# Patient Record
Sex: MALE | Race: WHITE | NOT HISPANIC OR LATINO | URBAN - METROPOLITAN AREA
[De-identification: names, ages, dates, MRNs, and addresses within clinical notes are randomized per-mention and may not be internally consistent; named-entity substitution may affect disease eponyms.]

---

## 2017-06-28 ENCOUNTER — OFFICE (AMBULATORY)
Dept: URBAN - METROPOLITAN AREA CLINIC 75 | Facility: CLINIC | Age: 57
End: 2017-06-28
Payer: OTHER GOVERNMENT

## 2017-06-28 VITALS
DIASTOLIC BLOOD PRESSURE: 82 MMHG | WEIGHT: 189 LBS | HEIGHT: 70 IN | HEART RATE: 80 BPM | SYSTOLIC BLOOD PRESSURE: 122 MMHG

## 2017-06-28 DIAGNOSIS — Z12.11 ENCOUNTER FOR SCREENING FOR MALIGNANT NEOPLASM OF COLON: ICD-10-CM

## 2017-06-28 PROCEDURE — 99203 OFFICE O/P NEW LOW 30 MIN: CPT

## 2017-07-19 VITALS
SYSTOLIC BLOOD PRESSURE: 93 MMHG | RESPIRATION RATE: 15 BRPM | HEART RATE: 49 BPM | DIASTOLIC BLOOD PRESSURE: 60 MMHG | RESPIRATION RATE: 20 BRPM | DIASTOLIC BLOOD PRESSURE: 71 MMHG | DIASTOLIC BLOOD PRESSURE: 74 MMHG | SYSTOLIC BLOOD PRESSURE: 116 MMHG | OXYGEN SATURATION: 98 % | RESPIRATION RATE: 22 BRPM | DIASTOLIC BLOOD PRESSURE: 70 MMHG | SYSTOLIC BLOOD PRESSURE: 131 MMHG | DIASTOLIC BLOOD PRESSURE: 57 MMHG | SYSTOLIC BLOOD PRESSURE: 120 MMHG | HEIGHT: 70 IN | WEIGHT: 189 LBS | SYSTOLIC BLOOD PRESSURE: 92 MMHG | TEMPERATURE: 97.9 F | SYSTOLIC BLOOD PRESSURE: 112 MMHG | DIASTOLIC BLOOD PRESSURE: 65 MMHG | DIASTOLIC BLOOD PRESSURE: 59 MMHG | DIASTOLIC BLOOD PRESSURE: 75 MMHG | OXYGEN SATURATION: 96 % | HEART RATE: 61 BPM | RESPIRATION RATE: 18 BRPM | SYSTOLIC BLOOD PRESSURE: 88 MMHG | DIASTOLIC BLOOD PRESSURE: 62 MMHG | SYSTOLIC BLOOD PRESSURE: 128 MMHG | RESPIRATION RATE: 16 BRPM | RESPIRATION RATE: 17 BRPM | SYSTOLIC BLOOD PRESSURE: 101 MMHG | TEMPERATURE: 96.8 F | HEART RATE: 52 BPM | SYSTOLIC BLOOD PRESSURE: 97 MMHG | DIASTOLIC BLOOD PRESSURE: 82 MMHG | HEART RATE: 55 BPM | HEART RATE: 60 BPM | OXYGEN SATURATION: 97 % | HEART RATE: 57 BPM | SYSTOLIC BLOOD PRESSURE: 89 MMHG | HEART RATE: 56 BPM

## 2017-07-20 ENCOUNTER — AMBULATORY SURGICAL CENTER (AMBULATORY)
Dept: URBAN - METROPOLITAN AREA SURGERY 17 | Facility: SURGERY | Age: 57
End: 2017-07-20
Payer: OTHER GOVERNMENT

## 2017-07-20 DIAGNOSIS — K57.30 DIVERTICULOSIS OF LARGE INTESTINE WITHOUT PERFORATION OR ABS: ICD-10-CM

## 2017-07-20 DIAGNOSIS — Z12.11 ENCOUNTER FOR SCREENING FOR MALIGNANT NEOPLASM OF COLON: ICD-10-CM

## 2017-07-20 DIAGNOSIS — K64.8 OTHER HEMORRHOIDS: ICD-10-CM

## 2017-07-20 PROCEDURE — 45378 DIAGNOSTIC COLONOSCOPY: CPT

## 2017-07-20 RX ADMIN — PROPOFOL 50 MG: 10 INJECTION, EMULSION INTRAVENOUS at 13:07

## 2017-07-20 RX ADMIN — PROPOFOL 50 MG: 10 INJECTION, EMULSION INTRAVENOUS at 12:56

## 2017-07-20 RX ADMIN — PROPOFOL 50 MG: 10 INJECTION, EMULSION INTRAVENOUS at 13:04

## 2017-07-20 RX ADMIN — PROPOFOL 50 MG: 10 INJECTION, EMULSION INTRAVENOUS at 13:00

## 2017-07-20 RX ADMIN — PROPOFOL 50 MG: 10 INJECTION, EMULSION INTRAVENOUS at 12:58

## 2017-07-20 RX ADMIN — PROPOFOL 50 MG: 10 INJECTION, EMULSION INTRAVENOUS at 13:02

## 2017-07-20 RX ADMIN — LIDOCAINE HYDROCHLORIDE 50 MG: 10 INJECTION, SOLUTION EPIDURAL; INFILTRATION; INTRACAUDAL; PERINEURAL at 12:54

## 2017-07-20 RX ADMIN — PROPOFOL 100 MG: 10 INJECTION, EMULSION INTRAVENOUS at 12:54

## 2018-09-05 ENCOUNTER — OFFICE VISIT CONVERTED (OUTPATIENT)
Dept: FAMILY MEDICINE CLINIC | Facility: CLINIC | Age: 58
End: 2018-09-05
Attending: NURSE PRACTITIONER

## 2018-09-05 ENCOUNTER — CONVERSION ENCOUNTER (OUTPATIENT)
Dept: FAMILY MEDICINE CLINIC | Facility: CLINIC | Age: 58
End: 2018-09-05

## 2018-09-12 ENCOUNTER — CONVERSION ENCOUNTER (OUTPATIENT)
Dept: MAMMOGRAPHY | Facility: HOSPITAL | Age: 58
End: 2018-09-12

## 2018-12-03 ENCOUNTER — OFFICE VISIT CONVERTED (OUTPATIENT)
Dept: FAMILY MEDICINE CLINIC | Facility: CLINIC | Age: 58
End: 2018-12-03
Attending: NURSE PRACTITIONER

## 2019-01-11 ENCOUNTER — OFFICE VISIT CONVERTED (OUTPATIENT)
Dept: UROLOGY | Facility: CLINIC | Age: 59
End: 2019-01-11
Attending: UROLOGY

## 2019-06-03 ENCOUNTER — CONVERSION ENCOUNTER (OUTPATIENT)
Dept: FAMILY MEDICINE CLINIC | Facility: CLINIC | Age: 59
End: 2019-06-03

## 2019-06-03 ENCOUNTER — OFFICE VISIT CONVERTED (OUTPATIENT)
Dept: FAMILY MEDICINE CLINIC | Facility: CLINIC | Age: 59
End: 2019-06-03
Attending: NURSE PRACTITIONER

## 2019-06-03 ENCOUNTER — HOSPITAL ENCOUNTER (OUTPATIENT)
Dept: FAMILY MEDICINE CLINIC | Facility: CLINIC | Age: 59
Discharge: HOME | End: 2019-06-03
Attending: NURSE PRACTITIONER

## 2019-06-03 LAB
ALBUMIN SERPL-MCNC: 4.5 G/DL (ref 3.5–5)
ALBUMIN/GLOB SERPL: 1.4 {RATIO} (ref 1.4–2.6)
ALP SERPL-CCNC: 73 U/L (ref 56–119)
ALT SERPL-CCNC: 33 U/L (ref 10–40)
ANION GAP SERPL CALC-SCNC: 22 MMOL/L (ref 8–19)
AST SERPL-CCNC: 30 U/L (ref 15–50)
BILIRUB SERPL-MCNC: 1.18 MG/DL (ref 0.2–1.3)
BUN SERPL-MCNC: 19 MG/DL (ref 5–25)
BUN/CREAT SERPL: 19 {RATIO} (ref 6–20)
CALCIUM SERPL-MCNC: 9.6 MG/DL (ref 8.7–10.4)
CHLORIDE SERPL-SCNC: 104 MMOL/L (ref 99–111)
CHOLEST SERPL-MCNC: 164 MG/DL (ref 107–200)
CHOLEST/HDLC SERPL: 3 {RATIO} (ref 3–6)
CONV CO2: 20 MMOL/L (ref 22–32)
CONV TOTAL PROTEIN: 7.7 G/DL (ref 6.3–8.2)
CREAT UR-MCNC: 0.99 MG/DL (ref 0.7–1.2)
GFR SERPLBLD BASED ON 1.73 SQ M-ARVRAT: >60 ML/MIN/{1.73_M2}
GLOBULIN UR ELPH-MCNC: 3.2 G/DL (ref 2–3.5)
GLUCOSE SERPL-MCNC: 94 MG/DL (ref 70–99)
HDLC SERPL-MCNC: 55 MG/DL (ref 40–60)
LDLC SERPL CALC-MCNC: 94 MG/DL (ref 70–100)
OSMOLALITY SERPL CALC.SUM OF ELEC: 294 MOSM/KG (ref 273–304)
POTASSIUM SERPL-SCNC: 4.7 MMOL/L (ref 3.5–5.3)
PSA SERPL-MCNC: 3.23 NG/ML (ref 0–4)
SODIUM SERPL-SCNC: 141 MMOL/L (ref 135–147)
TRIGL SERPL-MCNC: 73 MG/DL (ref 40–150)
VLDLC SERPL-MCNC: 15 MG/DL (ref 5–37)

## 2019-12-03 ENCOUNTER — OFFICE VISIT CONVERTED (OUTPATIENT)
Dept: FAMILY MEDICINE CLINIC | Facility: CLINIC | Age: 59
End: 2019-12-03
Attending: NURSE PRACTITIONER

## 2020-09-02 ENCOUNTER — CONVERSION ENCOUNTER (OUTPATIENT)
Dept: FAMILY MEDICINE CLINIC | Facility: CLINIC | Age: 60
End: 2020-09-02

## 2020-09-02 ENCOUNTER — HOSPITAL ENCOUNTER (OUTPATIENT)
Dept: FAMILY MEDICINE CLINIC | Facility: CLINIC | Age: 60
Discharge: HOME | End: 2020-09-02
Attending: NURSE PRACTITIONER

## 2020-09-02 ENCOUNTER — OFFICE VISIT CONVERTED (OUTPATIENT)
Dept: FAMILY MEDICINE CLINIC | Facility: CLINIC | Age: 60
End: 2020-09-02
Attending: NURSE PRACTITIONER

## 2020-09-03 LAB
25(OH)D3 SERPL-MCNC: 42.8 NG/ML (ref 30–100)
ALBUMIN SERPL-MCNC: 4.6 G/DL (ref 3.5–5)
ALBUMIN/GLOB SERPL: 1.3 {RATIO} (ref 1.4–2.6)
ALP SERPL-CCNC: 75 U/L (ref 56–155)
ALT SERPL-CCNC: 22 U/L (ref 10–40)
ANION GAP SERPL CALC-SCNC: 20 MMOL/L (ref 8–19)
AST SERPL-CCNC: 23 U/L (ref 15–50)
BASOPHILS # BLD AUTO: 0.04 10*3/UL (ref 0–0.2)
BASOPHILS NFR BLD AUTO: 0.5 % (ref 0–3)
BILIRUB SERPL-MCNC: 0.6 MG/DL (ref 0.2–1.3)
BUN SERPL-MCNC: 20 MG/DL (ref 5–25)
BUN/CREAT SERPL: 21 {RATIO} (ref 6–20)
CALCIUM SERPL-MCNC: 10 MG/DL (ref 8.7–10.4)
CHLORIDE SERPL-SCNC: 105 MMOL/L (ref 99–111)
CHOLEST SERPL-MCNC: 188 MG/DL (ref 107–200)
CHOLEST/HDLC SERPL: 3.1 {RATIO} (ref 3–6)
CONV ABS IMM GRAN: 0.02 10*3/UL (ref 0–0.2)
CONV CO2: 21 MMOL/L (ref 22–32)
CONV IMMATURE GRAN: 0.3 % (ref 0–1.8)
CONV TOTAL PROTEIN: 8.1 G/DL (ref 6.3–8.2)
CREAT UR-MCNC: 0.96 MG/DL (ref 0.7–1.2)
DEPRECATED RDW RBC AUTO: 49.6 FL (ref 35.1–43.9)
EOSINOPHIL # BLD AUTO: 0.06 10*3/UL (ref 0–0.7)
EOSINOPHIL # BLD AUTO: 0.8 % (ref 0–7)
ERYTHROCYTE [DISTWIDTH] IN BLOOD BY AUTOMATED COUNT: 13.6 % (ref 11.6–14.4)
GFR SERPLBLD BASED ON 1.73 SQ M-ARVRAT: >60 ML/MIN/{1.73_M2}
GLOBULIN UR ELPH-MCNC: 3.5 G/DL (ref 2–3.5)
GLUCOSE SERPL-MCNC: 89 MG/DL (ref 70–99)
HCT VFR BLD AUTO: 46.4 % (ref 42–52)
HDLC SERPL-MCNC: 61 MG/DL (ref 40–60)
HGB BLD-MCNC: 14.7 G/DL (ref 14–18)
LDLC SERPL CALC-MCNC: 109 MG/DL (ref 70–100)
LYMPHOCYTES # BLD AUTO: 2.12 10*3/UL (ref 1–5)
LYMPHOCYTES NFR BLD AUTO: 28.5 % (ref 20–45)
MCH RBC QN AUTO: 30.8 PG (ref 27–31)
MCHC RBC AUTO-ENTMCNC: 31.7 G/DL (ref 33–37)
MCV RBC AUTO: 97.3 FL (ref 80–96)
MONOCYTES # BLD AUTO: 0.76 10*3/UL (ref 0.2–1.2)
MONOCYTES NFR BLD AUTO: 10.2 % (ref 3–10)
NEUTROPHILS # BLD AUTO: 4.43 10*3/UL (ref 2–8)
NEUTROPHILS NFR BLD AUTO: 59.7 % (ref 30–85)
NRBC CBCN: 0 % (ref 0–0.7)
OSMOLALITY SERPL CALC.SUM OF ELEC: 294 MOSM/KG (ref 273–304)
PLATELET # BLD AUTO: 295 10*3/UL (ref 130–400)
PMV BLD AUTO: 11.8 FL (ref 9.4–12.4)
POTASSIUM SERPL-SCNC: 4.7 MMOL/L (ref 3.5–5.3)
PSA SERPL-MCNC: 3.78 NG/ML (ref 0–4)
RBC # BLD AUTO: 4.77 10*6/UL (ref 4.7–6.1)
SODIUM SERPL-SCNC: 141 MMOL/L (ref 135–147)
TRIGL SERPL-MCNC: 90 MG/DL (ref 40–150)
TSH SERPL-ACNC: 3.11 M[IU]/L (ref 0.27–4.2)
VLDLC SERPL-MCNC: 18 MG/DL (ref 5–37)
WBC # BLD AUTO: 7.43 10*3/UL (ref 4.8–10.8)

## 2021-03-08 ENCOUNTER — HOSPITAL ENCOUNTER (OUTPATIENT)
Dept: FAMILY MEDICINE CLINIC | Facility: CLINIC | Age: 61
Discharge: HOME | End: 2021-03-08
Attending: NURSE PRACTITIONER

## 2021-03-08 ENCOUNTER — OFFICE VISIT CONVERTED (OUTPATIENT)
Dept: FAMILY MEDICINE CLINIC | Facility: CLINIC | Age: 61
End: 2021-03-08
Attending: NURSE PRACTITIONER

## 2021-03-08 ENCOUNTER — CONVERSION ENCOUNTER (OUTPATIENT)
Dept: FAMILY MEDICINE CLINIC | Facility: CLINIC | Age: 61
End: 2021-03-08

## 2021-03-08 LAB
ALBUMIN SERPL-MCNC: 4.3 G/DL (ref 3.5–5)
ALBUMIN/GLOB SERPL: 1.3 {RATIO} (ref 1.4–2.6)
ALP SERPL-CCNC: 60 U/L (ref 56–155)
ALT SERPL-CCNC: 25 U/L (ref 10–40)
ANION GAP SERPL CALC-SCNC: 14 MMOL/L (ref 8–19)
AST SERPL-CCNC: 23 U/L (ref 15–50)
BASOPHILS # BLD AUTO: 0.05 10*3/UL (ref 0–0.2)
BASOPHILS NFR BLD AUTO: 0.7 % (ref 0–3)
BILIRUB SERPL-MCNC: 0.78 MG/DL (ref 0.2–1.3)
BUN SERPL-MCNC: 23 MG/DL (ref 5–25)
BUN/CREAT SERPL: 21 {RATIO} (ref 6–20)
CALCIUM SERPL-MCNC: 9.9 MG/DL (ref 8.7–10.4)
CHLORIDE SERPL-SCNC: 106 MMOL/L (ref 99–111)
CHOLEST SERPL-MCNC: 152 MG/DL (ref 107–200)
CHOLEST/HDLC SERPL: 2.9 {RATIO} (ref 3–6)
CONV ABS IMM GRAN: 0.02 10*3/UL (ref 0–0.2)
CONV CO2: 25 MMOL/L (ref 22–32)
CONV IMMATURE GRAN: 0.3 % (ref 0–1.8)
CONV TOTAL PROTEIN: 7.6 G/DL (ref 6.3–8.2)
CREAT UR-MCNC: 1.1 MG/DL (ref 0.7–1.2)
DEPRECATED RDW RBC AUTO: 41.7 FL (ref 35.1–43.9)
EOSINOPHIL # BLD AUTO: 0.25 10*3/UL (ref 0–0.7)
EOSINOPHIL # BLD AUTO: 3.5 % (ref 0–7)
ERYTHROCYTE [DISTWIDTH] IN BLOOD BY AUTOMATED COUNT: 12.1 % (ref 11.6–14.4)
GFR SERPLBLD BASED ON 1.73 SQ M-ARVRAT: >60 ML/MIN/{1.73_M2}
GLOBULIN UR ELPH-MCNC: 3.3 G/DL (ref 2–3.5)
GLUCOSE SERPL-MCNC: 75 MG/DL (ref 70–99)
HCT VFR BLD AUTO: 43.5 % (ref 42–52)
HDLC SERPL-MCNC: 52 MG/DL (ref 40–60)
HGB BLD-MCNC: 14.7 G/DL (ref 14–18)
LDLC SERPL CALC-MCNC: 89 MG/DL (ref 70–100)
LYMPHOCYTES # BLD AUTO: 1.95 10*3/UL (ref 1–5)
LYMPHOCYTES NFR BLD AUTO: 27.5 % (ref 20–45)
MCH RBC QN AUTO: 31.6 PG (ref 27–31)
MCHC RBC AUTO-ENTMCNC: 33.8 G/DL (ref 33–37)
MCV RBC AUTO: 93.5 FL (ref 80–96)
MONOCYTES # BLD AUTO: 0.77 10*3/UL (ref 0.2–1.2)
MONOCYTES NFR BLD AUTO: 10.8 % (ref 3–10)
NEUTROPHILS # BLD AUTO: 4.06 10*3/UL (ref 2–8)
NEUTROPHILS NFR BLD AUTO: 57.2 % (ref 30–85)
NRBC CBCN: 0 % (ref 0–0.7)
OSMOLALITY SERPL CALC.SUM OF ELEC: 292 MOSM/KG (ref 273–304)
PLATELET # BLD AUTO: 247 10*3/UL (ref 130–400)
PMV BLD AUTO: 11.2 FL (ref 9.4–12.4)
POTASSIUM SERPL-SCNC: 4.6 MMOL/L (ref 3.5–5.3)
RBC # BLD AUTO: 4.65 10*6/UL (ref 4.7–6.1)
SODIUM SERPL-SCNC: 140 MMOL/L (ref 135–147)
TRIGL SERPL-MCNC: 55 MG/DL (ref 40–150)
VLDLC SERPL-MCNC: 11 MG/DL (ref 5–37)
WBC # BLD AUTO: 7.1 10*3/UL (ref 4.8–10.8)

## 2021-05-13 NOTE — PROGRESS NOTES
Progress Note      Patient Name: Andrew Miranda   Patient ID: [id number]   Sex: Male   YOB: 1960        Visit Date: September 2, 2020    Provider: ZANE Post   Location: [facility]   Location Address: [address] [place] [address], [address]  [place], [place]  [id number]   Location Phone: [phone]          Chief Complaint  · ESt care  · Med refill      History Of Present Illness  Andrew Miranda is a 60 year old /White male who presents for evaluation and treatment of:      Patient is a 60-year-old gentleman who comes in for annual physical exam.  Patient typically seen Gracy Zavala in the past he is here today to establish care with me.  Patient has a history of hyperlipidemia, GERD, chronic back pain and insomnia.  He is needing medication refills.  Patient states he stable on medications.  Blood pressure today stable at 118/78 heart rate 71.  He has never smoked.  He denies any chest pain, change in vision, shortness of breath.    Patient does have a history of recurring back pain.  He states his previous provider provided him with 3 days worth of Percocet and that would last 2 months he only needs it when he has flareups.  He states he also uses prednisone as needed with flareups.  He states his back pain occurs daily at a pain scale of a 2-3 out of 10 he states that every once in a while he will get a flareup and will require pain medication and steroids.  His last flareup had been over the weekend he states he had steroids but no pain medication and is inquiring on whether he can have 3 days worth of Percocet to have as needed when his back pain occurs.       Past Medical History  Disease Name Date Onset Notes   GERD (gastroesophageal reflux disease) --  --    Hernia --  --    Hyperlipidemia --  --    Insomnia --  --    Kidney stone --  --    Low back pain --  --          Past Surgical History  Procedure Name Date Notes   Colonoscopy 7/20/2017 [facility]- repeat in 10 yrs.   Hernia (Umbilical) --  --    [place] Tooth Extraction --  --          Medication List  Name Date Started Instructions   Celebrex 200 mg oral capsule 09/02/2020 take 1 capsule (200 mg) by oral route once daily for 90 days   Lipitor 80 mg oral tablet 09/02/2020 take 1 tablet (80 mg) by oral route once daily for 90 days   Percocet 5-325 mg oral tablet 09/02/2020 take 1 tablet by oral route every 6 hours as needed for 3 days   prednisone oral  --    Prilosec OTC 20 mg oral tablet,delayed release (DR/EC) 09/02/2020 take 1 tablet by oral route daily for 90 days   Skelaxin 800 mg oral tablet 09/02/2020 take 1 tablet (800 mg) by oral route 3-4 times daily as needed for 30 days   trazodone 50 mg oral tablet 09/02/2020 take 1 tablet (50 mg) by oral route once daily at bedtime for 90 days         Allergy List  Allergen Name Date Reaction Notes   NO KNOWN DRUG ALLERGIES --  --  --        Allergies Reconciled  Family Medical History  Disease Name Relative/Age Notes   Breast Neoplasm, Malignant  --    Stroke  --          Social History  Finding Status Start/Stop Quantity Notes   Alcohol Never --/-- --  --    Disabled --  --/-- --  30%   Tobacco Never --/-- --  --          Immunizations  NameDate Admin Mfg Trade Name Lot Number Route Inj VIS Given VIS Publication   InfluenzaRefused 12/03/2019 NE Not Entered  NE NE     Comments:          Review of Systems  · Constitutional  o Denies  o : fever, fatigue, weight loss, weight gain  · Eyes  o Denies  o : double vision, impaired vision, blurred vision  · HENT  o Denies  o : headaches, vertigo, lightheadedness  · Cardiovascular  o Denies  o : lower extremity edema, claudication, chest pressure, palpitations  · Respiratory  o Denies  o : shortness of breath, wheezing, cough, hemoptysis, dyspnea on exertion  · Gastrointestinal  o Denies  o : nausea, vomiting, diarrhea, constipation, abdominal pain  · Integument  o Denies  o : rash, itching, pigmentation  "changes  · Musculoskeletal  o Admits  o : back pain (chronic)  o Denies  o : joint pain, joint swelling, muscle pain  · Psychiatric  o Denies  o : anxiety, depression, suicidal ideation, homicidal ideation      Vitals  Date Time BP Position Site L\R Cuff Size HR RR TEMP (F) WT  HT  BMI kg/m2 BSA m2 O2 Sat        09/02/2020 09:05 /78 Sitting    71 - R  98.9 178lbs 8oz 5'  10\" 25.61 2 97 %          Physical Examination  · Constitutional  o Appearance  o : well-nourished, well developed, in no acute distress  · Eyes  o Conjunctivae  o : conjunctivae normal, no exudates present  o Sclerae  o : sclerae white  o Pupils and Irises  o : pupils equal and round, and reactive to light and accomodation bilaterally  o Eyelids/Ocular Adnexae  o : extra ocular movements intact  · Respiratory  o Respiratory Effort  o : breathing unlabored, no accessory muscle use  o Inspection of Chest  o : normal appearance, no retractions  o Auscultation of Lungs  o : normal breath sounds bilaterally  · Cardiovascular  o Heart  o :   § Auscultation of Heart  § : regular rate and rhythm, no murmurs, gallops or rubs  o Peripheral Vascular System  o :   § Extremities  § : no edema  · Musculoskeletal  o Spine  o :   § Inspection/Palpation  § : no spinal tenderness, scoliosis or kyphosis present  · Neurologic  o Mental Status Examination  o :   § Orientation  § : alert and oriented x3  § Speech/Language  § : normal speech pattern  o Gait and Station  o : normal gait, able to stand without difficulty  · Psychiatric  o Judgement and Insight  o : judgment and insight intact, judgement for everyday activities and social situations within normal limits, insight intact  o Thought Processes  o : rate of thoughts normal, thought content logical  o Mood and Affect  o : mood normal, affect appropriate              Assessment  · Annual physical exam     V70.0/Z00.00  · GERD (gastroesophageal reflux " disease)     530.81/K21.9  · Hyperlipidemia     272.4/E78.5  · Lumbago     724.2/M54.5  Will give 3 days worth of Percocet, patient unable to urinate for UDS will come in as a walk-in or at next visit we will get the UDS. He does not appear to be at high risk for addiction or abusing the drug. Patient is aware of the addiction quality of the product.  · Vitamin D deficiency     268.9/E55.9    Problems Reconciled  Plan  · Orders  o Vitamin D Level (37528) - 268.9/E55.9 - 09/02/2020  o Male Physical Primary Care Panel (CMP, CBC, TSH, Lipid, PSA) [facility] (42514, 39828, 23340, 97709, 22957, ) - V70.0/Z00.00, 272.4/E78.5, 530.81/K21.9, 268.9/E55.9 - 09/02/2020  o ACO-39: Current medications updated and reviewed () - - 09/02/2020  · Medications  o Celebrex 200 mg oral capsule   SIG: take 1 capsule (200 mg) by oral route once daily for 90 days   DISP: (90) capsule with 1 refills  Prescribed on 09/02/2020     o Percocet 5-325 mg oral tablet   SIG: take 1 tablet by oral route every 6 hours as needed for 3 days   DISP: (12) tablet with 0 refills  Prescribed on 09/02/2020     o Lipitor 80 mg oral tablet   SIG: take 1 tablet (80 mg) by oral route once daily for 90 days   DISP: (90) tablet with 1 refills  Adjusted on 09/02/2020     o Prilosec OTC 20 mg oral tablet,delayed release (DR/EC)   SIG: take 1 tablet by oral route daily for 90 days   DISP: (90) tablet with 1 refills  Adjusted on 09/02/2020     o Skelaxin 800 mg oral tablet   SIG: take 1 tablet (800 mg) by oral route 3-4 times daily as needed for 30 days   DISP: (120) tablet with 3 refills  Adjusted on 09/02/2020     o trazodone 50 mg oral tablet   SIG: take 1 tablet (50 mg) by oral route once daily at bedtime for 90 days   DISP: (90) tablet with 1 refills  Adjusted on 09/02/2020     o meloxicam 15 mg oral tablet   SIG: take 1 tablet (15 mg) by oral route once daily   DISP: (90) tablet with 1 refills  Discontinued on 09/02/2020     · Instructions  o Reviewed health  maintenance flowsheet and updated information. Orders were placed and/or patient's response was documented.  o Maintain a healthy weight. Avoid tight fitting clothes. Avoid fried, fatty foods, tomato sauce, chocolate, mint, garlic, onion, alcohol. caffeine. Eat smaller meals, dont lie down after a meal, dont smoke. Elevate the head of your bed 6-9 inches.  o Recommended exercise program to assist with cholesterol, weight loss and overall health improvement.  o Advised that cheeses and other sources of dairy fats, animal fats, fast food, and the extras (candy, pastries, pies, doughnuts and cookies) all contain LDL raising nutrients. Advised to increase fruits, vegetables, whole grains, and to monitor portion sizes.   o See note for indication of controlled substance. [place] and drug screen have been reviewed. Controlled substance agreement signed and scanned into chart. After discussion of risks and benefits of medication, I have determined patient is suitable for Rx while demonstrating the ability to safely follow and administer the medication plan. Patient understands the expectation that medication directions cannot be adjusted without a provider's written approval.   o Take all medications as prescribed/directed.  o Patient was educated/instructed on their diagnosis, treatment and medications prior to discharge from the clinic today.  o Call the office with any concerns or questions.  o Patient given paper scripts today. Percocet  · Disposition  o Call or Return if symptoms worsen or persist.  o follow up in 6 months  o follow up as needed  o call the office with any questions or concerns            Electronically Signed by: Nasra Burton APRN -Author on September 2, 2020 09:54:02 AM

## 2021-05-14 VITALS
WEIGHT: 181.25 LBS | HEIGHT: 70 IN | HEART RATE: 58 BPM | BODY MASS INDEX: 25.95 KG/M2 | SYSTOLIC BLOOD PRESSURE: 122 MMHG | OXYGEN SATURATION: 98 % | DIASTOLIC BLOOD PRESSURE: 82 MMHG | TEMPERATURE: 97.6 F

## 2021-05-14 VITALS
HEART RATE: 71 BPM | HEIGHT: 70 IN | TEMPERATURE: 98.9 F | DIASTOLIC BLOOD PRESSURE: 78 MMHG | SYSTOLIC BLOOD PRESSURE: 118 MMHG | OXYGEN SATURATION: 97 % | WEIGHT: 178.5 LBS | BODY MASS INDEX: 25.56 KG/M2

## 2021-05-14 NOTE — PROGRESS NOTES
Progress Note      Patient Name: Andrew Miranda   Patient ID: [id number]   Sex: Male   YOB: 1960    Primary Care Provider: Nasra DEGROOT    Visit Date: March 8, 2021    Provider: ZANE Post   Location: [facility]   Location Address: [address] [place] [address], [address] [place] [address]  [id number]   Location Phone: [phone]          Chief Complaint  · 6 mth F/U  · Med refill      History Of Present Illness  Andrew Miranda is a 60 year old /White male who presents for evaluation and treatment of:      Is a 60-year-old gentleman who comes in for wellness visit/6-month follow-up.  He has a history of GERD, hyperlipidemia and polyarthralgia.  Patient is needing medication refills and labs checked.  He does have a history of back pain that will exacerbate at times he still has the Medrol pack and Percocet that was prescribed him 6 months ago.  Blood pressure stable today at 122/82.  He denies any headache, chest pain or change in vision.  Overall he feels well without complaint.       Past Medical History  Disease Name Date Onset Notes   GERD (gastroesophageal reflux disease) --  --    Hernia --  --    Hyperlipidemia --  --    Insomnia --  --    Kidney stone --  --    Low back pain --  --          Past Surgical History  Procedure Name Date Notes   Colonoscopy 7/20/2017 [facility]- repeat in 10 yrs.   Hernia (Umbilical) --  --    [place] Tooth Extraction --  --          Medication List  Name Date Started Instructions   Celebrex 200 mg oral capsule 03/08/2021 take 1 capsule (200 mg) by oral route once daily for 90 days   Flomax 0.4 mg oral capsule  take 1 capsule (0.4 mg) by oral route once daily 1/2 hour following the same meal each day   Lipitor 80 mg oral tablet 03/08/2021 take 1 tablet (80 mg) by oral route once daily for 90 days   oxybutynin chloride 10 mg oral tablet extended release 24hr  take 1 tablet (10 mg) by oral route once daily  "  Prilosec OTC 20 mg oral tablet,delayed release (/EC) 03/08/2021 take 1 tablet by oral route daily for 90 days   Skelaxin 800 mg oral tablet 03/08/2021 take 1 tablet (800 mg) by oral route 3-4 times daily as needed for 30 days   trazodone 50 mg oral tablet 03/08/2021 take 1 tablet (50 mg) by oral route once daily at bedtime for 90 days         Allergy List  Allergen Name Date Reaction Notes   NO KNOWN DRUG ALLERGIES --  --  --        Allergies Reconciled  Family Medical History  Disease Name Relative/Age Notes   Breast Neoplasm, Malignant  --    Stroke  --          Social History  Finding Status Start/Stop Quantity Notes   Alcohol Never --/-- --  --    Disabled --  --/-- --  30%   Tobacco Never --/-- --  --          Immunizations  NameDate Admin Mfg Trade Name Lot Number Route Inj VIS Given VIS Publication   InfluenzaRefused 03/08/2021 NE Not Entered  NE NE     Comments:          Review of Systems  · Constitutional  o Denies  o : fever, fatigue, weight loss, weight gain  · Eyes  o Denies  o : impaired vision, blurred vision, changes in vision  · HENT  o Denies  o : headaches, vertigo, lightheadedness  · Cardiovascular  o Denies  o : lower extremity edema, claudication, chest pressure, palpitations  · Respiratory  o Denies  o : shortness of breath, wheezing, cough, hemoptysis, dyspnea on exertion  · Gastrointestinal  o Denies  o : nausea, vomiting, diarrhea, constipation, abdominal pain  · Musculoskeletal  o Admits  o : back pain (chronic no exacerbation at this time)  o Denies  o : joint pain, joint swelling, muscle pain  · Psychiatric  o Denies  o : anxiety, depression, suicidal ideation, homicidal ideation      Vitals  Date Time BP Position Site L\R Cuff Size HR RR TEMP (F) WT  HT  BMI kg/m2 BSA m2 O2 Sat FR L/min FiO2 HC       03/08/2021 10:07 /82 Sitting    58 - R  97.6 181lbs 4oz 5'  10\" 26.01 2.02 98 %            Physical Examination  · Constitutional  o Appearance  o : well-nourished, well developed, " in no acute distress  · Eyes  o Conjunctivae  o : conjunctivae normal, no exudates present  o Sclerae  o : sclerae white  o Pupils and Irises  o : pupils equal and round, and reactive to light and accomodation bilaterally  o Eyelids/Ocular Adnexae  o : extra ocular movements intact  · Respiratory  o Respiratory Effort  o : breathing unlabored, no accessory muscle use  o Inspection of Chest  o : normal appearance, no retractions  o Auscultation of Lungs  o : normal breath sounds bilaterally  · Cardiovascular  o Heart  o :   § Auscultation of Heart  § : regular rate and rhythm, no murmurs, gallops or rubs  o Peripheral Vascular System  o :   § Extremities  § : no edema  · Neurologic  o Mental Status Examination  o :   § Orientation  § : alert and oriented x3  § Speech/Language  § : normal speech pattern  o Gait and Station  o : normal gait, able to stand without difficulty  · Psychiatric  o Judgement and Insight  o : judgment and insight intact, judgement for everyday activities and social situations within normal limits, insight intact  o Thought Processes  o : rate of thoughts normal, thought content logical  o Mood and Affect  o : mood normal, affect appropriate              Assessment  · GERD (gastroesophageal reflux disease)     530.81/K21.9  · Hyperlipidemia     272.4/E78.5  · Polyarthralgia     719.49/M25.50  · Screening for depression     V79.0/Z13.89  · Wellness examination     V70.0/Z00.00  · Chronic back pain       Dorsalgia, unspecified     724.5/M54.9  Other chronic pain     724.5/G89.29  Patient's not needing any assistance or medication at this time I did discuss that before the 6-month follow-up if he has an exacerbation of back pain would be okay to call him to get a steroid pack. I verbalized with patient that that would be fine. Discussed return precautions. Patient verbalized understanding is agreeable treatment plan.      Plan  · Orders  o CBC with Auto Diff [facility] (55421) - V70.0/Z00.00,  272.4/E78.5, 530.81/K21.9 - 03/08/2021  o CMP [facility] ([address]8627) - V70.0/Z00.00, 272.4/E78.5, 530.81/K21.9 - 03/08/2021  o Lipid Panel [facility] ([id number]) - V70.0/Z00.00, 272.4/E78.5, 530.81/K21.9 - 03/08/2021  o ACO-18: Negative screen for clinical depression using a standardized tool () - - 03/08/2021   0  o ACO-14: Influenza immunization was not administered for reasons documented [facility] () - - 03/08/2021  o ACO-39: Current medications updated and reviewed (1159F, ) - - 03/08/2021  · Medications  o Celebrex 200 mg oral capsule   SIG: take 1 capsule (200 mg) by oral route once daily for 90 days   DISP: (90) Capsule with 1 refills  Adjusted on 03/08/2021     o Lipitor 80 mg oral tablet   SIG: take 1 tablet (80 mg) by oral route once daily for 90 days   DISP: (90) Tablet with 1 refills  Adjusted on 03/08/2021     o Prilosec OTC 20 mg oral tablet,delayed release (DR/EC)   SIG: take 1 tablet by oral route daily for 90 days   DISP: (90) Tablet with 1 refills  Adjusted on 03/08/2021     o Skelaxin 800 mg oral tablet   SIG: take 1 tablet (800 mg) by oral route 3-4 times daily as needed for 30 days   DISP: (120) Tablet with 3 refills  Adjusted on 03/08/2021     o trazodone 50 mg oral tablet   SIG: take 1 tablet (50 mg) by oral route once daily at bedtime for 90 days   DISP: (90) Tablet with 1 refills  Adjusted on 03/08/2021     o Medrol (Willis) 4 mg oral tablets,dose pack   SIG: take by oral route as directed per package instructions for 6 days   DISP: (1) 21 ct dose-pack with 0 refills  Discontinued on 03/08/2021     o Percocet 5-325 mg oral tablet   SIG: take 1 tablet by oral route every 6 hours as needed for 3 days   DISP: (12) tablet with 0 refills  Discontinued on 03/08/2021     · Instructions  o Advised that cheeses and other sources of dairy fats, animal fats, fast food, and the extras (candy, pastries, pies, doughnuts and cookies) all contain LDL raising nutrients. Advised to increase fruits, vegetables, whole grains,  and to monitor portion sizes.   o Depression Screen completed and scanned into the EMR under the designated folder within the patient's documents.  o Today's PHQ-9 result is __0_  o Take all medications as prescribed/directed.  o Patient was educated/instructed on their diagnosis, treatment and medications prior to discharge from the clinic today.  o Call the office with any concerns or questions.  · Disposition  o Call or Return if symptoms worsen or persist.  o follow up in 6 months  o follow up as needed  o call the office with any questions or concerns            Electronically Signed by: Nasra Burton APRN -Author on March 8, 2021 10:36:17 AM

## 2021-05-15 VITALS
DIASTOLIC BLOOD PRESSURE: 88 MMHG | WEIGHT: 187 LBS | HEIGHT: 70 IN | BODY MASS INDEX: 26.77 KG/M2 | HEART RATE: 61 BPM | TEMPERATURE: 98.6 F | SYSTOLIC BLOOD PRESSURE: 141 MMHG

## 2021-05-15 VITALS
OXYGEN SATURATION: 99 % | HEART RATE: 65 BPM | HEIGHT: 70 IN | WEIGHT: 187.12 LBS | BODY MASS INDEX: 26.79 KG/M2 | SYSTOLIC BLOOD PRESSURE: 126 MMHG | TEMPERATURE: 97.8 F | DIASTOLIC BLOOD PRESSURE: 80 MMHG

## 2021-05-15 VITALS
SYSTOLIC BLOOD PRESSURE: 118 MMHG | HEIGHT: 70 IN | DIASTOLIC BLOOD PRESSURE: 78 MMHG | HEART RATE: 51 BPM | WEIGHT: 190.5 LBS | TEMPERATURE: 98.9 F | OXYGEN SATURATION: 96 % | BODY MASS INDEX: 27.27 KG/M2

## 2021-05-16 VITALS
SYSTOLIC BLOOD PRESSURE: 115 MMHG | RESPIRATION RATE: 18 BRPM | HEART RATE: 57 BPM | OXYGEN SATURATION: 98 % | DIASTOLIC BLOOD PRESSURE: 75 MMHG | WEIGHT: 183 LBS | BODY MASS INDEX: 26.2 KG/M2 | HEIGHT: 70 IN

## 2021-05-16 VITALS
TEMPERATURE: 99.1 F | WEIGHT: 179.12 LBS | OXYGEN SATURATION: 99 % | HEART RATE: 70 BPM | HEIGHT: 70 IN | BODY MASS INDEX: 25.64 KG/M2 | SYSTOLIC BLOOD PRESSURE: 118 MMHG | DIASTOLIC BLOOD PRESSURE: 62 MMHG

## 2021-07-01 RX ORDER — METAXALONE 800 MG/1
1 TABLET ORAL 4 TIMES DAILY PRN
COMMUNITY
End: 2021-09-08 | Stop reason: SDUPTHER

## 2021-07-01 RX ORDER — OXYBUTYNIN CHLORIDE 10 MG/1
10 TABLET, EXTENDED RELEASE ORAL DAILY
COMMUNITY
End: 2021-09-08 | Stop reason: SDUPTHER

## 2021-07-01 RX ORDER — ATORVASTATIN CALCIUM 80 MG/1
1 TABLET, FILM COATED ORAL DAILY
COMMUNITY
End: 2021-09-08 | Stop reason: SDUPTHER

## 2021-07-01 RX ORDER — TAMSULOSIN HYDROCHLORIDE 0.4 MG/1
1 CAPSULE ORAL DAILY
COMMUNITY
End: 2021-09-08 | Stop reason: SDUPTHER

## 2021-07-01 RX ORDER — TRAZODONE HYDROCHLORIDE 50 MG/1
50 TABLET ORAL NIGHTLY
COMMUNITY
End: 2021-09-08 | Stop reason: SDUPTHER

## 2021-07-01 RX ORDER — OMEPRAZOLE 20 MG/1
20 CAPSULE, DELAYED RELEASE ORAL DAILY
COMMUNITY
End: 2021-09-08 | Stop reason: SDUPTHER

## 2021-07-01 RX ORDER — CELECOXIB 200 MG/1
1 CAPSULE ORAL DAILY
COMMUNITY
End: 2021-09-08 | Stop reason: SDUPTHER

## 2021-07-02 ENCOUNTER — OFFICE VISIT (OUTPATIENT)
Dept: FAMILY MEDICINE CLINIC | Facility: CLINIC | Age: 61
End: 2021-07-02

## 2021-07-02 VITALS
WEIGHT: 174.8 LBS | OXYGEN SATURATION: 98 % | HEART RATE: 61 BPM | DIASTOLIC BLOOD PRESSURE: 76 MMHG | SYSTOLIC BLOOD PRESSURE: 128 MMHG | HEIGHT: 70 IN | BODY MASS INDEX: 25.03 KG/M2 | TEMPERATURE: 97.7 F

## 2021-07-02 DIAGNOSIS — M51.36 DDD (DEGENERATIVE DISC DISEASE), LUMBAR: Primary | ICD-10-CM

## 2021-07-02 DIAGNOSIS — M47.816 SPONDYLOSIS OF LUMBAR REGION WITHOUT MYELOPATHY OR RADICULOPATHY: ICD-10-CM

## 2021-07-02 PROCEDURE — 96372 THER/PROPH/DIAG INJ SC/IM: CPT | Performed by: NURSE PRACTITIONER

## 2021-07-02 PROCEDURE — 99214 OFFICE O/P EST MOD 30 MIN: CPT | Performed by: NURSE PRACTITIONER

## 2021-07-02 RX ORDER — OXYCODONE HYDROCHLORIDE AND ACETAMINOPHEN 5; 325 MG/1; MG/1
1 TABLET ORAL EVERY 6 HOURS PRN
Qty: 12 TABLET | Refills: 0 | Status: SHIPPED | OUTPATIENT
Start: 2021-07-02 | End: 2021-07-05

## 2021-07-02 RX ORDER — KETOROLAC TROMETHAMINE 30 MG/ML
60 INJECTION, SOLUTION INTRAMUSCULAR; INTRAVENOUS ONCE
Status: COMPLETED | OUTPATIENT
Start: 2021-07-02 | End: 2021-07-02

## 2021-07-02 RX ORDER — METHYLPREDNISOLONE 4 MG/1
TABLET ORAL
Qty: 21 TABLET | Refills: 2 | Status: SHIPPED | OUTPATIENT
Start: 2021-07-02 | End: 2022-03-09

## 2021-07-02 RX ORDER — TRIAMCINOLONE ACETONIDE 1 MG/G
CREAM TOPICAL 2 TIMES DAILY PRN
Qty: 45 G | Refills: 2 | Status: SHIPPED | OUTPATIENT
Start: 2021-07-02 | End: 2021-08-01

## 2021-07-02 RX ORDER — METHYLPREDNISOLONE ACETATE 80 MG/ML
80 INJECTION, SUSPENSION INTRA-ARTICULAR; INTRALESIONAL; INTRAMUSCULAR; SOFT TISSUE ONCE
Status: COMPLETED | OUTPATIENT
Start: 2021-07-02 | End: 2021-07-02

## 2021-07-02 RX ADMIN — METHYLPREDNISOLONE ACETATE 80 MG: 80 INJECTION, SUSPENSION INTRA-ARTICULAR; INTRALESIONAL; INTRAMUSCULAR; SOFT TISSUE at 15:03

## 2021-07-02 RX ADMIN — KETOROLAC TROMETHAMINE 60 MG: 30 INJECTION, SOLUTION INTRAMUSCULAR; INTRAVENOUS at 15:02

## 2021-07-02 NOTE — PROGRESS NOTES
"Chief Complaint  Back Pain (constant ache)    Subjective        Social History     Socioeconomic History   • Marital status:      Spouse name: Not on file   • Number of children: Not on file   • Years of education: Not on file   • Highest education level: Not on file   Tobacco Use   • Smoking status: Never Smoker   • Smokeless tobacco: Never Used   Substance and Sexual Activity   • Alcohol use: Never   • Drug use: Never       Andrew Miranda presents to [facility]  Back Pain  This is a chronic problem. The current episode started more than 1 year ago. The problem occurs constantly. The problem has been waxing and waning since onset. The pain is present in the lumbar spine. The quality of the pain is described as aching and stabbing. The pain is at a severity of 5/10. The pain is moderate. The pain is the same all the time. The symptoms are aggravated by standing and twisting. Stiffness is present all day. Pertinent negatives include no bladder incontinence, bowel incontinence or paresthesias. He has tried muscle relaxant, bed rest and analgesics for the symptoms. The treatment provided mild relief.       Objective   Vital Signs:   /76   Pulse 61   Temp 97.7 °F (36.5 °C)   Ht 177.8 cm (70\")   Wt 79.3 kg (174 lb 12.8 oz)   SpO2 98%   BMI 25.08 kg/m²     Physical Exam  Vitals reviewed.   Constitutional:       Appearance: Normal appearance. He is well-developed.   HENT:      Head: Normocephalic and atraumatic.   Eyes:      Conjunctiva/sclera: Conjunctivae normal.      Pupils: Pupils are equal, round, and reactive to light.   Cardiovascular:      Rate and Rhythm: Normal rate and regular rhythm.      Heart sounds: No murmur heard.   No friction rub. No gallop.    Pulmonary:      Effort: Pulmonary effort is normal.      Breath sounds: Normal breath sounds. No wheezing or rhonchi.   Musculoskeletal:      Lumbar back: Spasms and tenderness present. No edema or deformity. "   Skin:     General: Skin is warm and dry.   Neurological:      Mental Status: He is alert and oriented to person, place, and time.   Psychiatric:         Mood and Affect: Mood and affect normal.         Behavior: Behavior normal.         Thought Content: Thought content normal.         Judgment: Judgment normal.        Result Review :   The following data was reviewed by: ZANE Maciel on 07/02/2021:  Common labs    Common Labsle 9/2/20 3/8/21 3/8/21 3/8/21     1511 1511 1511   Glucose 89  75    BUN 20  23    Creatinine 0.96  1.10    Sodium 141  140    Potassium 4.7  4.6    Chloride 105  106    Calcium 10.0  9.9    Albumin 4.6  4.3    Total Bilirubin 0.60  0.78    Alkaline Phosphatase 75  60    AST (SGOT) 23  23    ALT (SGPT) 22  25    WBC 7.43 7.10     Hemoglobin 14.7 14.7     Hematocrit 46.4 43.5     Platelets 295 247     Total Cholesterol 188   152   Triglycerides 90   55   HDL Cholesterol 61 (A)   52   LDL Cholesterol  109 (A)   89   PSA 3.78      (A) Abnormal value       Comments are available for some flowsheets but are not being displayed.                      Current Outpatient Medications on File Prior to Visit   Medication Sig Dispense Refill   • atorvastatin (Lipitor) 80 MG tablet Take 1 tablet by mouth Daily.     • celecoxib (CeleBREX) 200 MG capsule Take 1 capsule by mouth Daily.     • metaxalone (Skelaxin) 800 MG tablet Take 1 tablet by mouth 4 (Four) Times a Day As Needed.     • omeprazole (priLOSEC) 20 MG capsule Take 20 mg by mouth Daily.     • oxybutynin XL (DITROPAN-XL) 10 MG 24 hr tablet Take 10 mg by mouth Daily.     • tamsulosin (FLOMAX) 0.4 MG capsule 24 hr capsule Take 1 capsule by mouth Daily.     • traZODone (DESYREL) 50 MG tablet Take 50 mg by mouth Every Night.       No current facility-administered medications on file prior to visit.       Assessment and Plan    Diagnoses and all orders for this visit:    1. DDD (degenerative disc disease), lumbar (Primary)  Comments:  Will  refer back to pain management, hold Celebrex while taking steroids, patient given Toradol and Depo-Medrol injection in office.  Orders:  -     Ambulatory Referral to Pain Management  -     methylPREDNISolone acetate (DEPO-medrol) injection 80 mg  -     ketorolac (TORADOL) injection 60 mg  -     oxyCODONE-acetaminophen (Percocet) 5-325 MG per tablet; Take 1 tablet by mouth Every 6 (Six) Hours As Needed for Severe Pain  for up to 3 days.  Dispense: 12 tablet; Refill: 0  -     MRI Lumbar Spine Without Contrast; Future    2. Spondylosis of lumbar region without myelopathy or radiculopathy  -     Ambulatory Referral to Pain Management  -     methylPREDNISolone acetate (DEPO-medrol) injection 80 mg  -     ketorolac (TORADOL) injection 60 mg  -     oxyCODONE-acetaminophen (Percocet) 5-325 MG per tablet; Take 1 tablet by mouth Every 6 (Six) Hours As Needed for Severe Pain  for up to 3 days.  Dispense: 12 tablet; Refill: 0  -     MRI Lumbar Spine Without Contrast; Future    Other orders  -     methylPREDNISolone (MEDROL) 4 MG dose pack; Take as directed on package instructions.  Dispense: 21 tablet; Refill: 2  -     triamcinolone (KENALOG) 0.1 % cream; Apply  topically to the appropriate area as directed 2 (Two) Times a Day As Needed for Irritation or Rash for up to 30 days.  Dispense: 45 g; Refill: 2        Follow Up   No follow-ups on file.  Patient was given instructions and counseling regarding his condition or for health maintenance advice. Please see specific information pulled into the AVS if appropriate.

## 2021-07-02 NOTE — PATIENT INSTRUCTIONS
Spondylolysis Rehab  Ask your health care provider which exercises are safe for you. Do exercises exactly as told by your health care provider and adjust them as directed. It is normal to feel mild stretching, pulling, tightness, or discomfort as you do these exercises. Stop right away if you feel sudden pain or your pain gets worse. Do not begin these exercises until told by your health care provider.  Stretching and range-of-motion exercises  These exercises warm up your muscles and joints and improve the movement and flexibility of your hips and your back. These exercises may also help to relieve pain, numbness, and tingling.  Single knee to chest    1. Lie on your back on a firm surface with both legs straight.  2. Bend one of your knees. Use your hands to move your knee up toward your chest until you feel a gentle stretch in your lower back and buttock.  ? Hold your leg in this position by holding on to the front of your knee.  ? Keep your other leg as straight as possible.  3. Hold for __________ seconds.  4. Slowly return to the starting position.  5. Repeat this exercise with your other leg.  Repeat __________ times. Complete this exercise __________ times a day.  Hamstring stretch, supine    1. Lie on your back (supine position).  2. Loop a belt or towel over the ball of your left / right foot. The ball of your foot is on the walking surface, right under your toes.  3. Straighten your left / right knee and slowly pull on the belt or towel to raise your leg. Raise your leg until you feel a gentle stretch behind your knee or thigh (hamstring).  ? Do not let your left / right knee bend while you do this.  ? Keep your other leg flat on the floor.  4. Hold this position for __________ seconds.  5. Slowly return your leg to the starting position.  6. Repeat this exercise with your other leg.  Repeat __________ times. Complete this exercise __________ times a day.  Strengthening exercises  These exercises build  strength and endurance in your back. Endurance is the ability to use your muscles for a long time, even after they get tired.  Pelvic tilt  This exercise strengthens the muscles that lie deep in the abdomen.  1. Lie on your back on a firm bed or the floor. Bend your knees and keep your feet flat.  2. Tense your abdominal muscles. Tip your pelvis up toward the ceiling and flatten your lower back into the floor.  ? To help with this exercise, you may place a small towel under your lower back and try to push your back into the towel.  3. Hold for __________ seconds.  4. Let your muscles relax completely before you repeat this exercise.  Repeat __________ times. Complete this exercise __________ times a day.  Abdominal crunch    1. Lie on your back on a firm surface. Bend your knees and keep your feet flat. Cross your arms over your chest.  2. Tuck your chin down toward your chest, without bending your neck.  3. Use your abdominal muscles to lift your upper body off the ground, straight up into the air.  ? Try to lift yourself until your shoulder blades are off the ground. You may need to work up to this.  ? Keep your lower back on the ground while you crunch upward.  ? Do not hold your breath.  4. Slowly lower yourself down. Keep your abdominal muscles tense until you are back to the starting position.  Repeat __________ times. Complete this exercise __________ times a day.  Alternating arm and leg raises    1. Get on your hands and knees on a firm surface. If you are on a hard floor, you may want to use padding, such as an exercise mat, to cushion your knees.  2. Line up your arms and legs. Your hands should be directly below your shoulders, and your knees should be directly below your hips.  3. Lift your left leg behind you. At the same time, raise your right arm and straighten it in front of you.  ? Do not lift your leg higher than your hip.  ? Do not lift your arm higher than your shoulder.  ? Keep your abdominal  and back muscles tight.  ? Keep your hips facing the ground.  ? Do not arch your back.  ? Keep your balance carefully, and do not hold your breath.  4. Hold for __________ seconds.  5. Slowly return to the starting position.  6. Repeat with your right leg and your left arm.  Repeat __________ times. Complete this exercise __________ times a day.  Posture and body mechanics  Good posture and healthy body mechanics can help to relieve stress in your body's tissues and joints. Body mechanics refers to the movements and positions of your body while you do your daily activities. Posture is part of body mechanics. Good posture means:  · Your spine is in its natural S-curve position (neutral).  · Your shoulders are pulled back slightly.  · Your head is not tipped forward.  Follow these guidelines to improve your posture and body mechanics in your everyday activities.  Standing    · When standing, keep your spine neutral and your feet about hip width apart. Keep a slight bend in your knees. Your ears, shoulders, and hips should line up.  · When you do a task in which you  one place for a long time, place one foot up on a stable object that is 2-4 inches (5-10 cm) high, such as a footstool. This helps keep your spine neutral.  Sitting    · When sitting, keep your spine neutral and keep your feet flat on the floor. Use a footrest, if necessary, and keep your thighs parallel to the floor. Avoid rounding your shoulders, and avoid tilting your head forward.  · When working at a desk or a computer, keep your desk at a height where your hands are slightly lower than your elbows. Slide your chair under your desk so you are close enough to maintain good posture.  · When working at a computer, place your monitor at a height where you are looking straight ahead and you do not have to tilt your head forward or downward to look at the screen.  Resting    · When lying down and resting, avoid positions that are most painful for  you.  · If you have pain with activities such as sitting, bending, stooping, or squatting (flexion-based activities), lie in a position in which your body does not bend very much. For example, avoid curling up on your side with your arms and knees near your chest (fetal position).  · If you have pain with activities such as standing for a long time or reaching with your arms (extension-based activities), lie with your spine in a neutral position and bend your knees slightly. Try the following positions:  ? Lying on your side with a pillow between your knees.  ? Lying on your back with a pillow under your knees.  Lifting    · When lifting objects, keep your feet at least shoulder width apart and tighten your abdominal muscles.  · Bend your knees and hips and keep your spine neutral. It is important to lift using the strength of your legs, not your back. Do not lock your knees straight out.  · Always ask for help to lift heavy or awkward objects.  This information is not intended to replace advice given to you by your health care provider. Make sure you discuss any questions you have with your health care provider.  Document Revised: 04/13/2020 Document Reviewed: 04/13/2020  Elsevier Patient Education © 2021 [employer]    Degenerative Disk Disease    Degenerative disk disease is a condition caused by changes that occur in the spinal disks as a person ages. Spinal disks are soft and compressible disks located between the bones of your spine (vertebrae). These disks act like shock absorbers.  Degenerative disk disease can affect the whole spine. However, the neck and lower back are most often affected. Many changes can occur in the spinal disks with aging, such as:  · The spinal disks may dry and shrink.  · Small tears may occur in the tough, outer covering of the disk (annulus).  · The disk space may become smaller due to loss of water.  · Abnormal growths in the bone (spurs) may occur. This can put pressure on the  nerve roots exiting the spinal canal, causing pain.  · The spinal canal may become narrowed.  What are the causes?  This condition may be caused by:  · Normal degeneration with age.  · Injuries.  · Certain activities and sports that cause damage.  What increases the risk?  The following factors may make you more likely to develop this condition:  · Being overweight.  · Having a family history of degenerative disk disease.  · Smoking.  · Sudden injury.  · Doing work that requires heavy lifting.  What are the signs or symptoms?  Symptoms of this condition include:  · Pain that varies in intensity. Some people have no pain, while others have severe pain. The location of the pain depends on the part of your backbone that is affected. You may have:  ? Pain in your neck or arm if a disk in your neck area is affected.  ? Pain in your back, buttocks, or legs if a disk in your lower back is affected.  · Pain that becomes worse while bending or reaching up, or with twisting movements.  · Pain that may start gradually and then get worse as time passes. It may also start after a major or minor injury.  · Numbness or tingling in the arms or legs.  How is this diagnosed?  This condition may be diagnosed based on:  · Your symptoms and medical history.  · A physical exam.  · Imaging tests, including:  ? An X-ray of the spine.  ? MRI.  How is this treated?  This condition may be treated with:  · Medicines.  · Rehabilitation exercises. These activities aim to strengthen muscles in your back and abdomen to better support your spine.  If treatments do not help to relieve your symptoms or you have severe pain, you may need surgery.  Follow these instructions at home:  Medicines  · Take over-the-counter and prescription medicines only as told by your health care provider.  · Do not drive or use heavy machinery while taking prescription pain medicine.  · If you are taking prescription pain medicine, take actions to prevent or treat  constipation. Your health care provider may recommend that you:  ? Drink enough fluid to keep your urine pale yellow.  ? Eat foods that are high in fiber, such as fresh fruits and vegetables, whole grains, and beans.  ? Limit foods that are high in fat and processed sugars, such as fried or sweet foods.  ? Take an over-the-counter or prescription medicine for constipation.  Activity  · Rest as told by your health care provider.  · Ask your health care provider what activities are safe for you. Return to your normal activities as directed.  · Avoid sitting for a long time without moving. Get up to take short walks every 1-2 hours. This is important to improve blood flow and breathing. Ask for help if you feel weak or unsteady.  · Perform relaxation exercises as told by your health care provider.  · Maintain good posture.  · Do not lift anything that is heavier than 10 lb (4.5 kg), or the limit that you are told, until your health care provider says that it is safe.  · Follow proper lifting and walking techniques as told by your health care provider.  Managing pain, stiffness, and swelling    · If directed, put ice on the painful area. Icing can help to relieve pain.  ? Put ice in a plastic bag.  ? Place a towel between your skin and the bag.  ? Leave the ice on for 20 minutes, 2-3 times a day.  · If directed, apply heat to the painful area as often as told by your health care provider. Heat can reduce the stiffness of your muscles. Use the heat source that your health care provider recommends, such as a moist heat pack or a heating pad.  ? Place a towel between your skin and the heat source.  ? Leave the heat on for 20-30 minutes.  ? Remove the heat if your skin turns bright red. This is especially important if you are unable to feel pain, heat, or cold. You may have a greater risk of getting burned.  General instructions  · Change your sitting, standing, and sleeping habits as told by your health care  provider.  · Avoid sitting in the same position for long periods of time. Change positions frequently.  · Lose weight or maintain a healthy weight as told by your health care provider.  · Do not use any products that contain nicotine or tobacco, such as cigarettes and e-cigarettes. If you need help quitting, ask your health care provider.  · Wear supportive footwear.  · Keep all follow-up visits as told by your health care provider. This is important. This may include visits for physical therapy.  Contact a health care provider if you:  · Have pain that does not go away within 1-4 weeks.  · Lose your appetite.  · Lose weight without trying.  Get help right away if you:  · Have severe pain.  · Notice weakness in your arms, hands, or legs.  · Begin to lose control of your bladder or bowel movements.  · Have fevers or night sweats.  Summary  · Degenerative disk disease is a condition caused by changes that occur in the spinal disks as a person ages.  · Degenerative disk disease can affect the whole spine. However, the neck and lower back are most often affected.  · Take over-the-counter and prescription medicines only as told by your health care provider.  This information is not intended to replace advice given to you by your health care provider. Make sure you discuss any questions you have with your health care provider.  Document Revised: 12/13/2018 Document Reviewed: 12/13/2018  Elsevier Patient Education © 2021 [employer]

## 2021-07-14 ENCOUNTER — HOSPITAL ENCOUNTER (OUTPATIENT)
Dept: MRI IMAGING | Facility: HOSPITAL | Age: 61
Discharge: HOME | End: 2021-07-14
Admitting: NURSE PRACTITIONER

## 2021-07-14 DIAGNOSIS — M47.816 SPONDYLOSIS OF LUMBAR REGION WITHOUT MYELOPATHY OR RADICULOPATHY: ICD-10-CM

## 2021-07-14 DIAGNOSIS — M51.36 DDD (DEGENERATIVE DISC DISEASE), LUMBAR: ICD-10-CM

## 2021-07-14 PROCEDURE — 72148 MRI LUMBAR SPINE W/O DYE: CPT

## 2021-07-15 DIAGNOSIS — M51.36 DDD (DEGENERATIVE DISC DISEASE), LUMBAR: Primary | ICD-10-CM

## 2021-07-15 DIAGNOSIS — M54.50 LOW BACK PAIN, UNSPECIFIED BACK PAIN LATERALITY, UNSPECIFIED CHRONICITY, UNSPECIFIED WHETHER SCIATICA PRESENT: ICD-10-CM

## 2021-09-08 ENCOUNTER — OFFICE VISIT (OUTPATIENT)
Dept: FAMILY MEDICINE CLINIC | Facility: CLINIC | Age: 61
End: 2021-09-08

## 2021-09-08 VITALS
HEIGHT: 70 IN | SYSTOLIC BLOOD PRESSURE: 126 MMHG | TEMPERATURE: 97.9 F | OXYGEN SATURATION: 97 % | WEIGHT: 172.2 LBS | DIASTOLIC BLOOD PRESSURE: 80 MMHG | HEART RATE: 55 BPM | BODY MASS INDEX: 24.65 KG/M2

## 2021-09-08 DIAGNOSIS — K21.9 GASTROESOPHAGEAL REFLUX DISEASE WITHOUT ESOPHAGITIS: ICD-10-CM

## 2021-09-08 DIAGNOSIS — M25.50 POLYARTHRALGIA: ICD-10-CM

## 2021-09-08 DIAGNOSIS — Z09 FOLLOW-UP EXAM, 3-6 MONTHS SINCE PREVIOUS EXAM: Primary | ICD-10-CM

## 2021-09-08 DIAGNOSIS — E78.2 MIXED HYPERLIPIDEMIA: ICD-10-CM

## 2021-09-08 DIAGNOSIS — N32.81 OVERACTIVE BLADDER: ICD-10-CM

## 2021-09-08 LAB
25(OH)D3 SERPL-MCNC: 47.8 NG/ML
ALBUMIN SERPL-MCNC: 4.5 G/DL (ref 3.5–5.2)
ALBUMIN/GLOB SERPL: 1.4 G/DL
ALP SERPL-CCNC: 65 U/L (ref 39–117)
ALT SERPL W P-5'-P-CCNC: 17 U/L (ref 1–41)
ANION GAP SERPL CALCULATED.3IONS-SCNC: 8.4 MMOL/L (ref 5–15)
AST SERPL-CCNC: 23 U/L (ref 1–40)
BASOPHILS # BLD AUTO: 0.04 10*3/MM3 (ref 0–0.2)
BASOPHILS NFR BLD AUTO: 0.7 % (ref 0–1.5)
BILIRUB SERPL-MCNC: 0.8 MG/DL (ref 0–1.2)
BUN SERPL-MCNC: 19 MG/DL (ref 8–23)
BUN/CREAT SERPL: 21.1 (ref 7–25)
CALCIUM SPEC-SCNC: 9.5 MG/DL (ref 8.6–10.5)
CHLORIDE SERPL-SCNC: 104 MMOL/L (ref 98–107)
CHOLEST SERPL-MCNC: 155 MG/DL (ref 0–200)
CO2 SERPL-SCNC: 26.6 MMOL/L (ref 22–29)
CREAT SERPL-MCNC: 0.9 MG/DL (ref 0.76–1.27)
DEPRECATED RDW RBC AUTO: 44.1 FL (ref 37–54)
EOSINOPHIL # BLD AUTO: 0.22 10*3/MM3 (ref 0–0.4)
EOSINOPHIL NFR BLD AUTO: 3.7 % (ref 0.3–6.2)
ERYTHROCYTE [DISTWIDTH] IN BLOOD BY AUTOMATED COUNT: 12.4 % (ref 12.3–15.4)
GFR SERPL CREATININE-BSD FRML MDRD: 103 ML/MIN/1.73
GFR SERPL CREATININE-BSD FRML MDRD: 85 ML/MIN/1.73
GLOBULIN UR ELPH-MCNC: 3.2 GM/DL
GLUCOSE SERPL-MCNC: 88 MG/DL (ref 65–99)
HCT VFR BLD AUTO: 42.5 % (ref 37.5–51)
HDLC SERPL-MCNC: 52 MG/DL (ref 40–60)
HGB BLD-MCNC: 14.2 G/DL (ref 13–17.7)
IMM GRANULOCYTES # BLD AUTO: 0.02 10*3/MM3 (ref 0–0.05)
IMM GRANULOCYTES NFR BLD AUTO: 0.3 % (ref 0–0.5)
LDLC SERPL CALC-MCNC: 91 MG/DL (ref 0–100)
LDLC/HDLC SERPL: 1.77 {RATIO}
LYMPHOCYTES # BLD AUTO: 1.7 10*3/MM3 (ref 0.7–3.1)
LYMPHOCYTES NFR BLD AUTO: 28.4 % (ref 19.6–45.3)
MCH RBC QN AUTO: 32.3 PG (ref 26.6–33)
MCHC RBC AUTO-ENTMCNC: 33.4 G/DL (ref 31.5–35.7)
MCV RBC AUTO: 96.8 FL (ref 79–97)
MONOCYTES # BLD AUTO: 0.61 10*3/MM3 (ref 0.1–0.9)
MONOCYTES NFR BLD AUTO: 10.2 % (ref 5–12)
NEUTROPHILS NFR BLD AUTO: 3.4 10*3/MM3 (ref 1.7–7)
NEUTROPHILS NFR BLD AUTO: 56.7 % (ref 42.7–76)
NRBC BLD AUTO-RTO: 0 /100 WBC (ref 0–0.2)
PLATELET # BLD AUTO: 257 10*3/MM3 (ref 140–450)
PMV BLD AUTO: 10.7 FL (ref 6–12)
POTASSIUM SERPL-SCNC: 4.2 MMOL/L (ref 3.5–5.2)
PROT SERPL-MCNC: 7.7 G/DL (ref 6–8.5)
RBC # BLD AUTO: 4.39 10*6/MM3 (ref 4.14–5.8)
SODIUM SERPL-SCNC: 139 MMOL/L (ref 136–145)
TRIGL SERPL-MCNC: 56 MG/DL (ref 0–150)
VLDLC SERPL-MCNC: 12 MG/DL (ref 5–40)
WBC # BLD AUTO: 5.99 10*3/MM3 (ref 3.4–10.8)

## 2021-09-08 PROCEDURE — 82306 VITAMIN D 25 HYDROXY: CPT | Performed by: NURSE PRACTITIONER

## 2021-09-08 PROCEDURE — 80053 COMPREHEN METABOLIC PANEL: CPT | Performed by: NURSE PRACTITIONER

## 2021-09-08 PROCEDURE — 85025 COMPLETE CBC W/AUTO DIFF WBC: CPT | Performed by: NURSE PRACTITIONER

## 2021-09-08 PROCEDURE — 99214 OFFICE O/P EST MOD 30 MIN: CPT | Performed by: NURSE PRACTITIONER

## 2021-09-08 PROCEDURE — 80061 LIPID PANEL: CPT | Performed by: NURSE PRACTITIONER

## 2021-09-08 RX ORDER — TAMSULOSIN HYDROCHLORIDE 0.4 MG/1
1 CAPSULE ORAL DAILY
Qty: 90 CAPSULE | Refills: 1 | Status: SHIPPED | OUTPATIENT
Start: 2021-09-08 | End: 2022-03-09 | Stop reason: SDUPTHER

## 2021-09-08 RX ORDER — TRAZODONE HYDROCHLORIDE 50 MG/1
50 TABLET ORAL NIGHTLY
Qty: 90 TABLET | Refills: 1 | Status: SHIPPED | OUTPATIENT
Start: 2021-09-08 | End: 2022-03-09 | Stop reason: SDUPTHER

## 2021-09-08 RX ORDER — OMEPRAZOLE 20 MG/1
20 CAPSULE, DELAYED RELEASE ORAL DAILY
Qty: 90 CAPSULE | Refills: 1 | Status: SHIPPED | OUTPATIENT
Start: 2021-09-08 | End: 2022-03-09 | Stop reason: SDUPTHER

## 2021-09-08 RX ORDER — OXYBUTYNIN CHLORIDE 10 MG/1
10 TABLET, EXTENDED RELEASE ORAL DAILY
Qty: 90 TABLET | Refills: 1 | Status: SHIPPED | OUTPATIENT
Start: 2021-09-08 | End: 2022-03-09 | Stop reason: SDUPTHER

## 2021-09-08 RX ORDER — CELECOXIB 200 MG/1
200 CAPSULE ORAL DAILY
Qty: 90 CAPSULE | Refills: 1 | Status: SHIPPED | OUTPATIENT
Start: 2021-09-08 | End: 2022-03-09 | Stop reason: SDUPTHER

## 2021-09-08 RX ORDER — METAXALONE 800 MG/1
800 TABLET ORAL 4 TIMES DAILY PRN
Qty: 270 TABLET | Refills: 1 | Status: SHIPPED | OUTPATIENT
Start: 2021-09-08 | End: 2021-12-07

## 2021-09-08 RX ORDER — ATORVASTATIN CALCIUM 80 MG/1
80 TABLET, FILM COATED ORAL DAILY
Qty: 90 TABLET | Refills: 1 | Status: SHIPPED | OUTPATIENT
Start: 2021-09-08 | End: 2022-03-09 | Stop reason: SDUPTHER

## 2021-09-08 NOTE — PROGRESS NOTES
Chief Complaint  Follow-up (6 month follow-up), Med Refill, Hyperlipidemia, and Heartburn    Subjective        Past Medical History:   Diagnosis Date   • GERD (gastroesophageal reflux disease)    • Hernia cerebri (CMS/HCC)    • Hyperlipidemia    • Insomnia    • Kidney stone    • Low back pain      Past Surgical History:   Procedure Laterality Date   • COLONOSCOPY  07/20/2017    [facility]- Repeat in 10 years.    • UMBILICAL HERNIA REPAIR     • WISDOM TOOTH EXTRACTION       Social History     Socioeconomic History   • Marital status:      Spouse name: Not on file   • Number of children: Not on file   • Years of education: Not on file   • Highest education level: Not on file   Tobacco Use   • Smoking status: Never Smoker   • Smokeless tobacco: Never Used   Substance and Sexual Activity   • Alcohol use: Never   • Drug use: Never       Andrew Miranda presents to [facility]  Overactive bladder  Patient has a history of BPH and overactive bladder is currently on Ditropan and and Flomax.  He is stable on current treatment and tolerates it well.  This is a chronic condition that has been ongoing for over a year.  He has seen urology in the past.  He has had a cystogram and lab work in the past.  He has been stable on these medications just needing refills today.    Hyperlipidemia  This is a chronic problem. The current episode started more than 1 year ago. The problem is controlled. He has no history of diabetes. Current antihyperlipidemic treatment includes diet change and statins. The current treatment provides moderate improvement of lipids. There are no compliance problems.  Risk factors for coronary artery disease include dyslipidemia, family history and male sex.   Heartburn  He complains of coughing, heartburn and nausea. He reports no abdominal pain. This is a chronic problem. The current episode started more than 1 year ago. The problem has been resolved  "(with medication). Risk factors include caffeine use and NSAIDs. He has tried a PPI for the symptoms. The treatment provided significant relief.   Pain  This is a chronic problem. The current episode started more than 1 year ago. The problem occurs intermittently. The problem has been waxing and waning. Associated symptoms include arthralgias, coughing and nausea. Pertinent negatives include no abdominal pain or fever. Associated symptoms comments: Back pain and joint pain. He has tried NSAIDs and oral narcotics (Epidural steroid injection, seen pain management) for the symptoms. The treatment provided moderate relief.       Objective   Vital Signs:   /80 (BP Location: Left arm, Patient Position: Sitting, Cuff Size: Adult)   Pulse 55   Temp 97.9 °F (36.6 °C) (Temporal)   Ht 177.8 cm (70\")   Wt 78.1 kg (172 lb 3.2 oz)   SpO2 97%   BMI 24.71 kg/m²     Physical Exam  Vitals reviewed.   Constitutional:       Appearance: Normal appearance. He is well-developed.   HENT:      Head: Normocephalic and atraumatic.   Eyes:      Conjunctiva/sclera: Conjunctivae normal.      Pupils: Pupils are equal, round, and reactive to light.   Cardiovascular:      Rate and Rhythm: Normal rate and regular rhythm.      Heart sounds: No murmur heard.   No friction rub. No gallop.    Pulmonary:      Effort: Pulmonary effort is normal.      Breath sounds: Normal breath sounds. No wheezing or rhonchi.   Skin:     General: Skin is warm and dry.   Neurological:      Mental Status: He is alert and oriented to person, place, and time.   Psychiatric:         Mood and Affect: Mood and affect normal.         Behavior: Behavior normal.         Thought Content: Thought content normal.         Judgment: Judgment normal.        Result Review :   The following data was reviewed by: ZANE Maciel on 09/08/2021:  CMP    CMP 3/8/21   Glucose 75   BUN 23   Creatinine 1.10   Sodium 140   Potassium 4.6   Chloride 106   Calcium 9.9 "   Albumin 4.3   Total Bilirubin 0.78   Alkaline Phosphatase 60   AST (SGOT) 23   ALT (SGPT) 25           CBC w/diff    CBC w/Diff 3/8/21   WBC 7.10   RBC 4.65 (A)   Hemoglobin 14.7   Hematocrit 43.5   MCV 93.5   MCH 31.6 (A)   MCHC 33.8   RDW 12.1   Platelets 247   Neutrophil Rel % 57.2   Lymphocyte Rel % 27.5   Monocyte Rel % 10.8 (A)   Eosinophil Rel % 3.5   Basophil Rel % 0.7   (A) Abnormal value            Lipid Panel    Lipid Panel 3/8/21   Total Cholesterol 152   Triglycerides 55   HDL Cholesterol 52   VLDL Cholesterol 11   LDL Cholesterol  89      Comments are available for some flowsheets but are not being displayed.                   Data reviewed: Most recent colonoscopy           Current Outpatient Medications on File Prior to Visit   Medication Sig Dispense Refill   • methylPREDNISolone (MEDROL) 4 MG dose pack Take as directed on package instructions. 21 tablet 2   • [DISCONTINUED] atorvastatin (Lipitor) 80 MG tablet Take 1 tablet by mouth Daily.     • [DISCONTINUED] celecoxib (CeleBREX) 200 MG capsule Take 1 capsule by mouth Daily.     • [DISCONTINUED] metaxalone (Skelaxin) 800 MG tablet Take 1 tablet by mouth 4 (Four) Times a Day As Needed.     • [DISCONTINUED] omeprazole (priLOSEC) 20 MG capsule Take 20 mg by mouth Daily.     • [DISCONTINUED] oxybutynin XL (DITROPAN-XL) 10 MG 24 hr tablet Take 10 mg by mouth Daily.     • [DISCONTINUED] tamsulosin (FLOMAX) 0.4 MG capsule 24 hr capsule Take 1 capsule by mouth Daily.     • [DISCONTINUED] traZODone (DESYREL) 50 MG tablet Take 50 mg by mouth Every Night.       No current facility-administered medications on file prior to visit.       Assessment and Plan    Diagnoses and all orders for this visit:    1. Follow-up exam, 3-6 months since previous exam (Primary)    2. Mixed hyperlipidemia  Comments:  Stable medication will recheck labs, adjust medication if needed  Orders:  -     CBC & Differential  -     Lipid Panel  -     Comprehensive Metabolic Panel    3.  Gastroesophageal reflux disease without esophagitis  Comments:  Stable on medication, will continue on current medication regimen  Orders:  -     Comprehensive Metabolic Panel  -     Vitamin D 25 Hydroxy    4. Polyarthralgia  Comments:  Stable on medication we will continue on current medication regimen    5. Overactive bladder  Comments:  Stable on medication continue current medication regimen    Other orders  -     atorvastatin (Lipitor) 80 MG tablet; Take 1 tablet by mouth Daily.  Dispense: 90 tablet; Refill: 1  -     celecoxib (CeleBREX) 200 MG capsule; Take 1 capsule by mouth Daily.  Dispense: 90 capsule; Refill: 1  -     metaxalone (Skelaxin) 800 MG tablet; Take 1 tablet by mouth 4 (Four) Times a Day As Needed for Muscle Spasms for up to 90 days.  Dispense: 270 tablet; Refill: 1  -     omeprazole (priLOSEC) 20 MG capsule; Take 1 capsule by mouth Daily.  Dispense: 90 capsule; Refill: 1  -     oxybutynin XL (DITROPAN-XL) 10 MG 24 hr tablet; Take 1 tablet by mouth Daily.  Dispense: 90 tablet; Refill: 1  -     tamsulosin (FLOMAX) 0.4 MG capsule 24 hr capsule; Take 1 capsule by mouth Daily.  Dispense: 90 capsule; Refill: 1  -     traZODone (DESYREL) 50 MG tablet; Take 1 tablet by mouth Every Night.  Dispense: 90 tablet; Refill: 1        Follow Up   No follow-ups on file.  Patient was given instructions and counseling regarding his condition or for health maintenance advice. Please see specific information pulled into the AVS if appropriate.

## 2021-09-08 NOTE — PATIENT INSTRUCTIONS
Diverticulitis    Diverticulitis is when small pouches in your colon (large intestine) get infected or swollen. This causes pain in the belly (abdomen) and watery poop (diarrhea).  These pouches are called diverticula. The pouches form in people who have a condition called diverticulosis.  What are the causes?  This condition may be caused by poop (stool) that gets trapped in the pouches in your colon. The poop lets germs (bacteria) grow in the pouches. This causes the infection.  What increases the risk?  You are more likely to get this condition if you have small pouches in your colon. The risk is higher if:  · You are overweight or very overweight (obese).  · You do not exercise enough.  · You drink alcohol.  · You smoke or use products with tobacco in them.  · You eat a diet that has a lot of red meat such as beef, pork, or lamb.  · You eat a diet that does not have enough fiber in it.  · You are older than 40 years of age.  What are the signs or symptoms?  · Pain in the belly. Pain is often on the left side, but it may be in other areas.  · Fever and feeling cold.  · Feeling like you may vomit.  · Vomiting.  · Having cramps.  · Feeling full.  · Changes to how often you poop.  · Blood in your poop.  How is this treated?  Most cases are treated at home by:  · Taking over-the-counter pain medicines.  · Following a clear liquid diet.  · Taking antibiotic medicines.  · Resting.  Very bad cases may need to be treated at a hospital. This may include:  · Not eating or drinking.  · Taking prescription pain medicine.  · Getting antibiotic medicines through an IV tube.  · Getting fluid and food through an IV tube.  · Having surgery.  When you are feeling better, your doctor may tell you to have a test to check your colon (colonoscopy).  Follow these instructions at home:  Medicines  · Take over-the-counter and prescription medicines only as told by your doctor. These include:  ? Antibiotics.  ? Pain medicines.  ? Fiber  pills.  ? Probiotics.  ? Stool softeners.  · If you were prescribed an antibiotic medicine, take it as told by your doctor. Do not stop taking the antibiotic even if you start to feel better.  · Ask your doctor if the medicine prescribed to you requires you to avoid driving or using machinery.  Eating and drinking    · Follow a diet as told by your doctor.  · When you feel better, your doctor may tell you to change your diet. You may need to eat a lot of fiber. Fiber makes it easier to poop (have a bowel movement). Foods with fiber include:  ? Berries.  ? Beans.  ? Lentils.  ? Green vegetables.  · Avoid eating red meat.    General instructions  · Do not use any products that contain nicotine or tobacco, such as cigarettes, e-cigarettes, and chewing tobacco. If you need help quitting, ask your doctor.  · Exercise 3 or more times a week. Try to get 30 minutes each time. Exercise enough to sweat and make your heart beat faster.  · Keep all follow-up visits as told by your doctor. This is important.  Contact a doctor if:  · Your pain does not get better.  · You are not pooping like normal.  Get help right away if:  · Your pain gets worse.  · Your symptoms do not get better.  · Your symptoms get worse very fast.  · You have a fever.  · You vomit more than one time.  · You have poop that is:  ? Bloody.  ? Black.  ? Tarry.  Summary  · This condition happens when small pouches in your colon get infected or swollen.  · Take medicines only as told by your doctor.  · Follow a diet as told by your doctor.  · Keep all follow-up visits as told by your doctor. This is important.  This information is not intended to replace advice given to you by your health care provider. Make sure you discuss any questions you have with your health care provider.  Document Revised: 09/28/2020 Document Reviewed: 09/28/2020  Elsevier Patient Education © 2021 [employer]

## 2021-09-21 ENCOUNTER — OFFICE VISIT (OUTPATIENT)
Dept: NEUROSURGERY | Facility: CLINIC | Age: 61
End: 2021-09-21

## 2021-09-21 VITALS
HEIGHT: 70 IN | SYSTOLIC BLOOD PRESSURE: 129 MMHG | DIASTOLIC BLOOD PRESSURE: 80 MMHG | WEIGHT: 174.4 LBS | BODY MASS INDEX: 24.97 KG/M2

## 2021-09-21 DIAGNOSIS — M54.50 CHRONIC MIDLINE LOW BACK PAIN WITHOUT SCIATICA: Primary | ICD-10-CM

## 2021-09-21 DIAGNOSIS — M51.37 DDD (DEGENERATIVE DISC DISEASE), LUMBOSACRAL: ICD-10-CM

## 2021-09-21 DIAGNOSIS — G89.29 CHRONIC MIDLINE LOW BACK PAIN WITHOUT SCIATICA: Primary | ICD-10-CM

## 2021-09-21 PROCEDURE — 99203 OFFICE O/P NEW LOW 30 MIN: CPT | Performed by: NEUROLOGICAL SURGERY

## 2021-09-21 NOTE — PROGRESS NOTES
"Chief Complaint  Back Pain    Subjective          Andrew Miranda who is a 61 y.o. year old male who presents to [facility] for Evaluation of the Spine.     The patient complains of pain located in the Lumbar Spine.  Patients states the pain has been present for 21 years.  The pain came on acutely.  He has had intermittent flareups about 3-4 times a year. He has noticed they are getting a little less frequent as he ages.The episodes are now lasting on average 3-4 days as he medicates quickly at onset. The pain scale level is 3-4/10 in severity.  The pain does not radiate.  The pain is waxing/waning and described as sharp.  The pain is worse at no particular time of day. Patient states minimizing movment makes the pain better.  Patient states movement makes the pain worse.    Associated Symptoms Include: Denies numbness or tingling  Conservative Interventions Include: Epidural Steroids that were somewhat effective., Oral Steroids that were somewhat effective. and Pain Medications that were somewhat effective.    Was this the result of an injury or accident?: No    History of Previous Spinal Surgery?: No    He reports that he has never smoked. He has never used smokeless tobacco.    Review of Systems   Musculoskeletal: Positive for back pain.        Objective   Vital Signs:   /80 (BP Location: Left arm, Patient Position: Sitting)   Ht 177.8 cm (70\")   Wt 79.1 kg (174 lb 6.4 oz)   BMI 25.02 kg/m²       Physical Exam  Constitutional:       Appearance: He is normal weight.   Cardiovascular:      Comments: No edema  Pulmonary:      Effort: Pulmonary effort is normal.   Musculoskeletal:      Comments: TTP in Eliud SI region   Neurological:      Mental Status: He is alert.      Sensory: No sensory deficit.      Motor: No weakness.      Deep Tendon Reflexes: Reflexes normal.   Psychiatric:         Mood and Affect: Mood normal.      -Adan's      Result Review :   I " personally reviewed the patient's MRI scan which shows DDD and an annular tear at L4-5. No canal stenosis or foraminal.          Assessment and Plan    Diagnoses and all orders for this visit:    1. Chronic midline low back pain without sciatica (Primary)    2. DDD (degenerative disc disease), lumbosacral    He will work on core strengthening. He will monitor for any worsened leg pain.     He could consider SI injections also based on pain in the SI region.       Follow Up   No follow-ups on file.  Patient was given instructions and counseling regarding his condition or for health maintenance advice. Please see specific information pulled into the AVS if appropriate.

## 2022-03-09 ENCOUNTER — OFFICE VISIT (OUTPATIENT)
Dept: FAMILY MEDICINE CLINIC | Facility: CLINIC | Age: 62
End: 2022-03-09

## 2022-03-09 VITALS
TEMPERATURE: 98.7 F | OXYGEN SATURATION: 96 % | HEIGHT: 70 IN | BODY MASS INDEX: 25.28 KG/M2 | HEART RATE: 70 BPM | SYSTOLIC BLOOD PRESSURE: 126 MMHG | DIASTOLIC BLOOD PRESSURE: 86 MMHG | WEIGHT: 176.6 LBS

## 2022-03-09 DIAGNOSIS — E78.2 MIXED HYPERLIPIDEMIA: ICD-10-CM

## 2022-03-09 DIAGNOSIS — Z12.5 ENCOUNTER FOR PROSTATE CANCER SCREENING: ICD-10-CM

## 2022-03-09 DIAGNOSIS — N40.1 BENIGN PROSTATIC HYPERPLASIA WITH POST-VOID DRIBBLING: ICD-10-CM

## 2022-03-09 DIAGNOSIS — N39.43 BENIGN PROSTATIC HYPERPLASIA WITH POST-VOID DRIBBLING: ICD-10-CM

## 2022-03-09 DIAGNOSIS — K21.9 GASTROESOPHAGEAL REFLUX DISEASE, UNSPECIFIED WHETHER ESOPHAGITIS PRESENT: ICD-10-CM

## 2022-03-09 DIAGNOSIS — Z09 FOLLOW-UP EXAM, 3-6 MONTHS SINCE PREVIOUS EXAM: Primary | ICD-10-CM

## 2022-03-09 LAB
ALBUMIN SERPL-MCNC: 4.9 G/DL (ref 3.5–5.2)
ALBUMIN/GLOB SERPL: 1.5 G/DL
ALP SERPL-CCNC: 62 U/L (ref 39–117)
ALT SERPL W P-5'-P-CCNC: 25 U/L (ref 1–41)
ANION GAP SERPL CALCULATED.3IONS-SCNC: 10.2 MMOL/L (ref 5–15)
AST SERPL-CCNC: 25 U/L (ref 1–40)
BASOPHILS # BLD AUTO: 0.04 10*3/MM3 (ref 0–0.2)
BASOPHILS NFR BLD AUTO: 0.6 % (ref 0–1.5)
BILIRUB SERPL-MCNC: 1.1 MG/DL (ref 0–1.2)
BUN SERPL-MCNC: 17 MG/DL (ref 8–23)
BUN/CREAT SERPL: 15 (ref 7–25)
CALCIUM SPEC-SCNC: 9.9 MG/DL (ref 8.6–10.5)
CHLORIDE SERPL-SCNC: 102 MMOL/L (ref 98–107)
CHOLEST SERPL-MCNC: 149 MG/DL (ref 0–200)
CO2 SERPL-SCNC: 26.8 MMOL/L (ref 22–29)
CREAT SERPL-MCNC: 1.13 MG/DL (ref 0.76–1.27)
DEPRECATED RDW RBC AUTO: 40.2 FL (ref 37–54)
EGFRCR SERPLBLD CKD-EPI 2021: 73 ML/MIN/1.73
EOSINOPHIL # BLD AUTO: 0.21 10*3/MM3 (ref 0–0.4)
EOSINOPHIL NFR BLD AUTO: 3 % (ref 0.3–6.2)
ERYTHROCYTE [DISTWIDTH] IN BLOOD BY AUTOMATED COUNT: 11.9 % (ref 12.3–15.4)
GLOBULIN UR ELPH-MCNC: 3.2 GM/DL
GLUCOSE SERPL-MCNC: 97 MG/DL (ref 65–99)
HCT VFR BLD AUTO: 45.5 % (ref 37.5–51)
HDLC SERPL-MCNC: 52 MG/DL (ref 40–60)
HGB BLD-MCNC: 15.4 G/DL (ref 13–17.7)
IMM GRANULOCYTES # BLD AUTO: 0.02 10*3/MM3 (ref 0–0.05)
IMM GRANULOCYTES NFR BLD AUTO: 0.3 % (ref 0–0.5)
LDLC SERPL CALC-MCNC: 81 MG/DL (ref 0–100)
LDLC/HDLC SERPL: 1.54 {RATIO}
LYMPHOCYTES # BLD AUTO: 1.83 10*3/MM3 (ref 0.7–3.1)
LYMPHOCYTES NFR BLD AUTO: 25.8 % (ref 19.6–45.3)
MCH RBC QN AUTO: 31.6 PG (ref 26.6–33)
MCHC RBC AUTO-ENTMCNC: 33.8 G/DL (ref 31.5–35.7)
MCV RBC AUTO: 93.2 FL (ref 79–97)
MONOCYTES # BLD AUTO: 0.68 10*3/MM3 (ref 0.1–0.9)
MONOCYTES NFR BLD AUTO: 9.6 % (ref 5–12)
NEUTROPHILS NFR BLD AUTO: 4.32 10*3/MM3 (ref 1.7–7)
NEUTROPHILS NFR BLD AUTO: 60.7 % (ref 42.7–76)
NRBC BLD AUTO-RTO: 0 /100 WBC (ref 0–0.2)
PLATELET # BLD AUTO: 248 10*3/MM3 (ref 140–450)
PMV BLD AUTO: 11.4 FL (ref 6–12)
POTASSIUM SERPL-SCNC: 4.3 MMOL/L (ref 3.5–5.2)
PROT SERPL-MCNC: 8.1 G/DL (ref 6–8.5)
PSA SERPL-MCNC: 5.1 NG/ML (ref 0–4)
RBC # BLD AUTO: 4.88 10*6/MM3 (ref 4.14–5.8)
SODIUM SERPL-SCNC: 139 MMOL/L (ref 136–145)
TRIGL SERPL-MCNC: 85 MG/DL (ref 0–150)
TSH SERPL DL<=0.05 MIU/L-ACNC: 3.12 UIU/ML (ref 0.27–4.2)
VLDLC SERPL-MCNC: 16 MG/DL (ref 5–40)
WBC NRBC COR # BLD: 7.1 10*3/MM3 (ref 3.4–10.8)

## 2022-03-09 PROCEDURE — 99214 OFFICE O/P EST MOD 30 MIN: CPT | Performed by: NURSE PRACTITIONER

## 2022-03-09 PROCEDURE — G0103 PSA SCREENING: HCPCS | Performed by: NURSE PRACTITIONER

## 2022-03-09 PROCEDURE — 36415 COLL VENOUS BLD VENIPUNCTURE: CPT | Performed by: NURSE PRACTITIONER

## 2022-03-09 PROCEDURE — 80053 COMPREHEN METABOLIC PANEL: CPT | Performed by: NURSE PRACTITIONER

## 2022-03-09 PROCEDURE — 84443 ASSAY THYROID STIM HORMONE: CPT | Performed by: NURSE PRACTITIONER

## 2022-03-09 PROCEDURE — 85025 COMPLETE CBC W/AUTO DIFF WBC: CPT | Performed by: NURSE PRACTITIONER

## 2022-03-09 PROCEDURE — 80061 LIPID PANEL: CPT | Performed by: NURSE PRACTITIONER

## 2022-03-09 RX ORDER — OMEPRAZOLE 20 MG/1
20 CAPSULE, DELAYED RELEASE ORAL DAILY
Qty: 90 CAPSULE | Refills: 1 | Status: SHIPPED | OUTPATIENT
Start: 2022-03-09 | End: 2022-09-01 | Stop reason: SDUPTHER

## 2022-03-09 RX ORDER — ATORVASTATIN CALCIUM 80 MG/1
80 TABLET, FILM COATED ORAL DAILY
Qty: 90 TABLET | Refills: 1 | Status: SHIPPED | OUTPATIENT
Start: 2022-03-09 | End: 2022-09-01 | Stop reason: SDUPTHER

## 2022-03-09 RX ORDER — TAMSULOSIN HYDROCHLORIDE 0.4 MG/1
1 CAPSULE ORAL DAILY
Qty: 90 CAPSULE | Refills: 1 | Status: SHIPPED | OUTPATIENT
Start: 2022-03-09 | End: 2022-09-01 | Stop reason: SDUPTHER

## 2022-03-09 RX ORDER — OXYBUTYNIN CHLORIDE 10 MG/1
10 TABLET, EXTENDED RELEASE ORAL DAILY
Qty: 90 TABLET | Refills: 1 | Status: SHIPPED | OUTPATIENT
Start: 2022-03-09 | End: 2022-09-01 | Stop reason: SDUPTHER

## 2022-03-09 RX ORDER — TRAZODONE HYDROCHLORIDE 50 MG/1
50 TABLET ORAL NIGHTLY
Qty: 90 TABLET | Refills: 1 | Status: SHIPPED | OUTPATIENT
Start: 2022-03-09 | End: 2022-09-01 | Stop reason: SDUPTHER

## 2022-03-09 RX ORDER — CELECOXIB 200 MG/1
200 CAPSULE ORAL DAILY
Qty: 90 CAPSULE | Refills: 1 | Status: SHIPPED | OUTPATIENT
Start: 2022-03-09 | End: 2022-09-01 | Stop reason: SDUPTHER

## 2022-03-09 NOTE — PROGRESS NOTES
Chief Complaint  Hyperlipidemia and Heartburn    Subjective          Medical History: has a past medical history of GERD (gastroesophageal reflux disease), Hernia cerebri (HCC), Hyperlipidemia, Insomnia, Kidney stone, Low back pain, and Thoracic disc disorder.     Surgical History: has a past surgical history that includes Colonoscopy (07/20/2017); Umbilical hernia repair; [place] tooth extraction; and Epidural block injection.     Family History: family history includes Breast cancer in an other family member; Cancer in his father and mother; Stroke in an other family member.     Social History: reports that he has never smoked. He has never used smokeless tobacco. He reports that he does not drink alcohol and does not use drugs.    Andrew Miranda presents to [facility]  6 month follow up    Declines flu shot        Hyperlipidemia  This is a chronic problem. The current episode started more than 1 year ago. The problem is controlled. He has no history of diabetes or obesity. Current antihyperlipidemic treatment includes statins and diet change. The current treatment provides moderate improvement of lipids. There are no compliance problems.  Risk factors for coronary artery disease include dyslipidemia, family history and male sex.   Heartburn  He complains of heartburn. This is a chronic problem. The current episode started more than 1 year ago. The heartburn is located in the substernum. The heartburn does not wake him from sleep. The heartburn does not limit his activity. The heartburn doesn't change with position. The symptoms are aggravated by certain foods and lying down. There are no known risk factors. The treatment provided significant relief.   Benign Prostatic Hypertrophy  This is a chronic problem. The current episode started more than 1 year ago. Obstructive symptoms do not include dribbling, incomplete emptying or straining. Nothing aggravates the symptoms. Past  "treatments include tamsulosin. The treatment provided significant relief. He has been using treatment for 2 or more years.       Objective   Vital Signs:   /86 (BP Location: Right arm, Patient Position: Sitting, Cuff Size: Adult)   Pulse 70   Temp 98.7 °F (37.1 °C) (Temporal)   Ht 177.8 cm (70\")   Wt 80.1 kg (176 lb 9.6 oz)   SpO2 96%   BMI 25.34 kg/m²     Physical Exam  Vitals reviewed.   Constitutional:       Appearance: Normal appearance. He is well-developed.   HENT:      Head: Normocephalic and atraumatic.   Eyes:      Conjunctiva/sclera: Conjunctivae normal.      Pupils: Pupils are equal, round, and reactive to light.   Cardiovascular:      Rate and Rhythm: Normal rate and regular rhythm.      Heart sounds: No murmur heard.    No friction rub.   Pulmonary:      Effort: Pulmonary effort is normal.      Breath sounds: Normal breath sounds. No wheezing or rhonchi.   Abdominal:      Palpations: Abdomen is soft.   Skin:     General: Skin is warm and dry.   Neurological:      Mental Status: He is alert and oriented to person, place, and time.   Psychiatric:         Mood and Affect: Mood and affect normal.         Behavior: Behavior normal.         Thought Content: Thought content normal.         Judgment: Judgment normal.        Result Review :   The following data was reviewed by: ZANE Maciel on 03/09/2022:  Common labs    Common Labsle 9/8/21 9/8/21 9/8/21    1139 1139 1139   Glucose   88   BUN   19   Creatinine   0.90   eGFR Non  Am   85   eGFR  Am   103   Sodium   139   Potassium   4.2   Chloride   104   Calcium   9.5   Albumin   4.50   Total Bilirubin   0.8   Alkaline Phosphatase   65   AST (SGOT)   23   ALT (SGPT)   17   WBC 5.99     Hemoglobin 14.2     Hematocrit 42.5     Platelets 257     Total Cholesterol  155    Triglycerides  56    HDL Cholesterol  52    LDL Cholesterol   91                        Current Outpatient Medications on File Prior to Visit   Medication " Sig Dispense Refill   • [DISCONTINUED] atorvastatin (Lipitor) 80 MG tablet Take 1 tablet by mouth Daily. 90 tablet 1   • [DISCONTINUED] celecoxib (CeleBREX) 200 MG capsule Take 1 capsule by mouth Daily. 90 capsule 1   • [DISCONTINUED] omeprazole (priLOSEC) 20 MG capsule Take 1 capsule by mouth Daily. 90 capsule 1   • [DISCONTINUED] oxybutynin XL (DITROPAN-XL) 10 MG 24 hr tablet Take 1 tablet by mouth Daily. 90 tablet 1   • [DISCONTINUED] tamsulosin (FLOMAX) 0.4 MG capsule 24 hr capsule Take 1 capsule by mouth Daily. 90 capsule 1   • [DISCONTINUED] traZODone (DESYREL) 50 MG tablet Take 1 tablet by mouth Every Night. 90 tablet 1   • [DISCONTINUED] methylPREDNISolone (MEDROL) 4 MG dose pack Take as directed on package instructions. 21 tablet 2     No current facility-administered medications on file prior to visit.        Assessment and Plan    Diagnoses and all orders for this visit:    1. Follow-up exam, 3-6 months since previous exam (Primary)    2. Mixed hyperlipidemia  Comments:  Will check labs, adjust medication if needed  Orders:  -     CBC & Differential  -     Lipid Panel  -     Comprehensive Metabolic Panel  -     TSH    3. Gastroesophageal reflux disease, unspecified whether esophagitis present  Comments:  Doing well on medication, states it works well we will continue to monitor patient's progress    4. Benign prostatic hyperplasia with post-void dribbling    5. Encounter for prostate cancer screening  -     PSA Screen  -     TSH    Other orders  -     atorvastatin (Lipitor) 80 MG tablet; Take 1 tablet by mouth Daily.  Dispense: 90 tablet; Refill: 1  -     celecoxib (CeleBREX) 200 MG capsule; Take 1 capsule by mouth Daily.  Dispense: 90 capsule; Refill: 1  -     omeprazole (priLOSEC) 20 MG capsule; Take 1 capsule by mouth Daily.  Dispense: 90 capsule; Refill: 1  -     oxybutynin XL (DITROPAN-XL) 10 MG 24 hr tablet; Take 1 tablet by mouth Daily.  Dispense: 90 tablet; Refill: 1  -     tamsulosin (FLOMAX) 0.4  MG capsule 24 hr capsule; Take 1 capsule by mouth Daily.  Dispense: 90 capsule; Refill: 1  -     traZODone (DESYREL) 50 MG tablet; Take 1 tablet by mouth Every Night.  Dispense: 90 tablet; Refill: 1        Follow Up   Return in about 6 months (around 9/9/2022) for Recheck.  Patient was given instructions and counseling regarding his condition or for health maintenance advice. Please see specific information pulled into the AVS if appropriate.

## 2022-03-14 DIAGNOSIS — R97.20 ELEVATED PSA MEASUREMENT: Primary | ICD-10-CM

## 2022-03-16 ENCOUNTER — TELEPHONE (OUTPATIENT)
Dept: FAMILY MEDICINE CLINIC | Facility: CLINIC | Age: 62
End: 2022-03-16

## 2022-03-16 NOTE — TELEPHONE ENCOUNTER
Caller: DOMINIC LONG    Best call back number: [phone]    What is the best time to reach you: ANYTIME    Who are you requesting to speak with (clinical staff, provider,  specific staff member): CLINICAL    What was the call regarding: PATIENT STATES THAT HE WOULD LIKE A CALL BACK TO DISCUSS HIS BLOOD WORK THAT HE HAD TAKEN LAST WEEK BECAUSE HIS PSA WAS HIGH.    Do you require a callback: YES

## 2022-03-16 NOTE — TELEPHONE ENCOUNTER
Attempted to contact pt regarding lab results. Left message for pt to return call. Mailed letter to pt.

## 2022-07-13 ENCOUNTER — PATIENT MESSAGE (OUTPATIENT)
Dept: FAMILY MEDICINE CLINIC | Facility: CLINIC | Age: 62
End: 2022-07-13

## 2022-07-13 DIAGNOSIS — M25.50 POLYARTHRALGIA: Primary | ICD-10-CM

## 2022-07-13 DIAGNOSIS — M47.816 SPONDYLOSIS OF LUMBAR REGION WITHOUT MYELOPATHY OR RADICULOPATHY: ICD-10-CM

## 2022-07-13 DIAGNOSIS — M54.50 LOW BACK PAIN, UNSPECIFIED BACK PAIN LATERALITY, UNSPECIFIED CHRONICITY, UNSPECIFIED WHETHER SCIATICA PRESENT: ICD-10-CM

## 2022-07-13 DIAGNOSIS — M51.36 DDD (DEGENERATIVE DISC DISEASE), LUMBAR: ICD-10-CM

## 2022-07-15 DIAGNOSIS — M51.36 DDD (DEGENERATIVE DISC DISEASE), LUMBAR: Primary | ICD-10-CM

## 2022-07-15 DIAGNOSIS — M47.816 SPONDYLOSIS OF LUMBAR REGION WITHOUT MYELOPATHY OR RADICULOPATHY: ICD-10-CM

## 2022-07-18 NOTE — TELEPHONE ENCOUNTER
From: Andrew Miranda  To: ZANE Maciel  Sent: 7/13/2022 1:32 PM EDT  Subject: referral for more injections    All My referrals to [facility] for injections have been used - How do I get more ?

## 2022-08-05 ENCOUNTER — OFFICE VISIT (OUTPATIENT)
Dept: FAMILY MEDICINE CLINIC | Facility: CLINIC | Age: 62
End: 2022-08-05

## 2022-08-05 VITALS
TEMPERATURE: 97.3 F | WEIGHT: 172 LBS | BODY MASS INDEX: 24.62 KG/M2 | OXYGEN SATURATION: 98 % | SYSTOLIC BLOOD PRESSURE: 120 MMHG | HEIGHT: 70 IN | DIASTOLIC BLOOD PRESSURE: 68 MMHG | HEART RATE: 67 BPM

## 2022-08-05 DIAGNOSIS — Z12.83 SKIN CANCER SCREENING: ICD-10-CM

## 2022-08-05 DIAGNOSIS — M51.36 DDD (DEGENERATIVE DISC DISEASE), LUMBAR: Primary | ICD-10-CM

## 2022-08-05 DIAGNOSIS — M54.50 CHRONIC MIDLINE LOW BACK PAIN WITHOUT SCIATICA: ICD-10-CM

## 2022-08-05 DIAGNOSIS — G89.29 CHRONIC MIDLINE LOW BACK PAIN WITHOUT SCIATICA: ICD-10-CM

## 2022-08-05 DIAGNOSIS — Z80.8 FAMILY HISTORY OF SKIN CANCER: ICD-10-CM

## 2022-08-05 PROCEDURE — 99214 OFFICE O/P EST MOD 30 MIN: CPT | Performed by: NURSE PRACTITIONER

## 2022-08-05 RX ORDER — PREDNISONE 20 MG/1
TABLET ORAL
Qty: 15 TABLET | Refills: 0 | Status: SHIPPED | OUTPATIENT
Start: 2022-08-05 | End: 2022-08-13

## 2022-08-05 RX ORDER — OXYCODONE HYDROCHLORIDE AND ACETAMINOPHEN 5; 325 MG/1; MG/1
1 TABLET ORAL EVERY 6 HOURS PRN
Qty: 12 TABLET | Refills: 0 | Status: SHIPPED | OUTPATIENT
Start: 2022-08-05

## 2022-08-05 NOTE — PROGRESS NOTES
Chief Complaint  Back Pain    Subjective     {Problem List  Visit Diagnosis   Encounters  Notes  Medications  Labs  Result Review Imaging  Media :23}     Medical History: has a past medical history of Diverticulosis (2020), GERD (gastroesophageal reflux disease), Hernia cerebri (HCC), Hyperlipidemia, Insomnia, Kidney stone, Low back pain, and Thoracic disc disorder.     Surgical History: has a past surgical history that includes Colonoscopy (07/20/2017); Umbilical hernia repair; [place] tooth extraction; and Epidural block injection.     Family History: family history includes Breast cancer in an other family member; Cancer in his father and mother; Stroke in an other family member.     Social History: reports that he has never smoked. He has never used smokeless tobacco. He reports that he does not drink alcohol and does not use drugs.    Andrew Miranda presents to [facility]  Patient does currently see pain management.  He has been receiving epidural steroid injections in his back.  Patient states that his pain management referral lapsed and he had to push his appointment out for another month.  Patient states due to pushing his appointment out his had a worsening episode of back pain.  Patient is requesting a steroid pack and a refill of the Percocet.  Patient was last given Percocet 7/20/2021.  He is at low risk, he does not abuse the medication, he was prescribed twelve 1 year ago and is requesting another 12.  We will refill the Percocet, and discussed with patient if pain persist to discuss ongoing pain control with pain management.  Patient verbalized understanding.    Back Pain  This is a chronic problem. The current episode started more than 1 year ago. The problem occurs constantly. The problem has been waxing and waning since onset. The pain is present in the lumbar spine. The quality of the pain is described as aching and stabbing. The pain is at a severity of  "4/10. The pain is worse during the day. The symptoms are aggravated by bending, position, sitting and twisting. Stiffness is present all day. Pertinent negatives include no abdominal pain, bladder incontinence, bowel incontinence, chest pain, dysuria, fever, headaches, leg pain, numbness, paresis, paresthesias, pelvic pain, perianal numbness, tingling, weakness or weight loss.       Objective   Vital Signs:   /68   Pulse 67   Temp 97.3 °F (36.3 °C)   Ht 177.8 cm (70\")   Wt 78 kg (172 lb)   SpO2 98%   BMI 24.68 kg/m²     Physical Exam  Vitals reviewed.   Constitutional:       Appearance: Normal appearance. He is well-developed.   HENT:      Head: Normocephalic and atraumatic.   Eyes:      Conjunctiva/sclera: Conjunctivae normal.      Pupils: Pupils are equal, round, and reactive to light.   Cardiovascular:      Rate and Rhythm: Normal rate and regular rhythm.      Heart sounds: No murmur heard.  Pulmonary:      Effort: Pulmonary effort is normal.      Breath sounds: Normal breath sounds. No wheezing or rhonchi.   Skin:     General: Skin is warm and dry.   Neurological:      Mental Status: He is alert and oriented to person, place, and time.   Psychiatric:         Mood and Affect: Mood and affect normal.         Behavior: Behavior normal.         Thought Content: Thought content normal.         Judgment: Judgment normal.        Result Review :   The following data was reviewed by: ZANE Maciel on 08/05/2022:  Common labs    Common Labsle 9/8/21 9/8/21 9/8/21 3/9/22 3/9/22 3/9/22 3/9/22    1139 1139 1139 1049 1049 1049 1049   Glucose   88   97    BUN   19   17    Creatinine   0.90   1.13    eGFR Non  Am   85       eGFR African Am   103       Sodium   139   139    Potassium   4.2   4.3    Chloride   104   102    Calcium   9.5   9.9    Albumin   4.50   4.90    Total Bilirubin   0.8   1.1    Alkaline Phosphatase   65   62    AST (SGOT)   23   25    ALT (SGPT)   17   25    WBC 5.99   7.10   "    Hemoglobin 14.2   15.4      Hematocrit 42.5   45.5      Platelets 257   248      Total Cholesterol  155   149     Triglycerides  56   85     HDL Cholesterol  52   52     LDL Cholesterol   91   81     PSA       5.100 (A)   (A) Abnormal value            Data reviewed: Radiologic studies MRI spine            Current Outpatient Medications on File Prior to Visit   Medication Sig Dispense Refill   • atorvastatin (Lipitor) 80 MG tablet Take 1 tablet by mouth Daily. 90 tablet 1   • celecoxib (CeleBREX) 200 MG capsule Take 1 capsule by mouth Daily. 90 capsule 1   • omeprazole (priLOSEC) 20 MG capsule Take 1 capsule by mouth Daily. 90 capsule 1   • oxybutynin XL (DITROPAN-XL) 10 MG 24 hr tablet Take 1 tablet by mouth Daily. 90 tablet 1   • tamsulosin (FLOMAX) 0.4 MG capsule 24 hr capsule Take 1 capsule by mouth Daily. 90 capsule 1   • traZODone (DESYREL) 50 MG tablet Take 1 tablet by mouth Every Night. 90 tablet 1     No current facility-administered medications on file prior to visit.        Assessment and Plan    Diagnoses and all orders for this visit:    1. DDD (degenerative disc disease), lumbar (Primary)  -     oxyCODONE-acetaminophen (Percocet) 5-325 MG per tablet; Take 1 tablet by mouth Every 6 (Six) Hours As Needed for Severe Pain .  Dispense: 12 tablet; Refill: 0    2. Chronic midline low back pain without sciatica  -     oxyCODONE-acetaminophen (Percocet) 5-325 MG per tablet; Take 1 tablet by mouth Every 6 (Six) Hours As Needed for Severe Pain .  Dispense: 12 tablet; Refill: 0    3. Skin cancer screening  Comments:  Familly history of skin cancer referral over to dermatology for skin cancer screening  Orders:  -     Ambulatory Referral to Dermatology    4. Family history of skin cancer    Other orders  -     predniSONE (DELTASONE) 20 MG tablet; Take 3 tablets by mouth Daily for 2 days, THEN 2 tablets Daily for 3 days, THEN 1 tablet Daily for 3 days.  Dispense: 15 tablet; Refill: 0        Follow Up   Return for  Recheck, If symptoms do not improve new concerning symptoms.  Patient was given instructions and counseling regarding his condition or for health maintenance advice. Please see specific information pulled into the AVS if appropriate.

## 2022-09-01 ENCOUNTER — OFFICE VISIT (OUTPATIENT)
Dept: FAMILY MEDICINE CLINIC | Facility: CLINIC | Age: 62
End: 2022-09-01

## 2022-09-01 VITALS
DIASTOLIC BLOOD PRESSURE: 76 MMHG | HEIGHT: 70 IN | WEIGHT: 172 LBS | BODY MASS INDEX: 24.62 KG/M2 | TEMPERATURE: 97.5 F | SYSTOLIC BLOOD PRESSURE: 112 MMHG | HEART RATE: 52 BPM | OXYGEN SATURATION: 98 %

## 2022-09-01 DIAGNOSIS — Z11.59 ENCOUNTER FOR HEPATITIS C SCREENING TEST FOR LOW RISK PATIENT: ICD-10-CM

## 2022-09-01 DIAGNOSIS — E55.9 VITAMIN D DEFICIENCY: ICD-10-CM

## 2022-09-01 DIAGNOSIS — M25.50 POLYARTHRALGIA: ICD-10-CM

## 2022-09-01 DIAGNOSIS — R97.20 ELEVATED PROSTATE SPECIFIC ANTIGEN (PSA): ICD-10-CM

## 2022-09-01 DIAGNOSIS — E78.2 MIXED HYPERLIPIDEMIA: ICD-10-CM

## 2022-09-01 DIAGNOSIS — Z00.00 ANNUAL PHYSICAL EXAM: Primary | ICD-10-CM

## 2022-09-01 LAB
25(OH)D3 SERPL-MCNC: 54.1 NG/ML (ref 30–100)
ALBUMIN SERPL-MCNC: 4.4 G/DL (ref 3.5–5.2)
ALBUMIN/GLOB SERPL: 1.5 G/DL
ALP SERPL-CCNC: 73 U/L (ref 39–117)
ALT SERPL W P-5'-P-CCNC: 24 U/L (ref 1–41)
ANION GAP SERPL CALCULATED.3IONS-SCNC: 9.4 MMOL/L (ref 5–15)
AST SERPL-CCNC: 27 U/L (ref 1–40)
BASOPHILS # BLD AUTO: 0.05 10*3/MM3 (ref 0–0.2)
BASOPHILS NFR BLD AUTO: 0.7 % (ref 0–1.5)
BILIRUB SERPL-MCNC: 1.2 MG/DL (ref 0–1.2)
BUN SERPL-MCNC: 24 MG/DL (ref 8–23)
BUN/CREAT SERPL: 22.4 (ref 7–25)
CALCIUM SPEC-SCNC: 9.7 MG/DL (ref 8.6–10.5)
CHLORIDE SERPL-SCNC: 104 MMOL/L (ref 98–107)
CHOLEST SERPL-MCNC: 170 MG/DL (ref 0–200)
CO2 SERPL-SCNC: 26.6 MMOL/L (ref 22–29)
CREAT SERPL-MCNC: 1.07 MG/DL (ref 0.76–1.27)
DEPRECATED RDW RBC AUTO: 42.5 FL (ref 37–54)
EGFRCR SERPLBLD CKD-EPI 2021: 77.5 ML/MIN/1.73
EOSINOPHIL # BLD AUTO: 0.22 10*3/MM3 (ref 0–0.4)
EOSINOPHIL NFR BLD AUTO: 3.1 % (ref 0.3–6.2)
ERYTHROCYTE [DISTWIDTH] IN BLOOD BY AUTOMATED COUNT: 12.3 % (ref 12.3–15.4)
GLOBULIN UR ELPH-MCNC: 3 GM/DL
GLUCOSE SERPL-MCNC: 98 MG/DL (ref 65–99)
HCT VFR BLD AUTO: 44.2 % (ref 37.5–51)
HCV AB SER DONR QL: NORMAL
HDLC SERPL-MCNC: 56 MG/DL (ref 40–60)
HGB BLD-MCNC: 14.5 G/DL (ref 13–17.7)
IMM GRANULOCYTES # BLD AUTO: 0.03 10*3/MM3 (ref 0–0.05)
IMM GRANULOCYTES NFR BLD AUTO: 0.4 % (ref 0–0.5)
LDLC SERPL CALC-MCNC: 102 MG/DL (ref 0–100)
LDLC/HDLC SERPL: 1.81 {RATIO}
LYMPHOCYTES # BLD AUTO: 1.71 10*3/MM3 (ref 0.7–3.1)
LYMPHOCYTES NFR BLD AUTO: 24.1 % (ref 19.6–45.3)
MCH RBC QN AUTO: 31.3 PG (ref 26.6–33)
MCHC RBC AUTO-ENTMCNC: 32.8 G/DL (ref 31.5–35.7)
MCV RBC AUTO: 95.3 FL (ref 79–97)
MONOCYTES # BLD AUTO: 0.63 10*3/MM3 (ref 0.1–0.9)
MONOCYTES NFR BLD AUTO: 8.9 % (ref 5–12)
NEUTROPHILS NFR BLD AUTO: 4.47 10*3/MM3 (ref 1.7–7)
NEUTROPHILS NFR BLD AUTO: 62.8 % (ref 42.7–76)
NRBC BLD AUTO-RTO: 0 /100 WBC (ref 0–0.2)
PLATELET # BLD AUTO: 436 10*3/MM3 (ref 140–450)
PMV BLD AUTO: 10.2 FL (ref 6–12)
POTASSIUM SERPL-SCNC: 4.9 MMOL/L (ref 3.5–5.2)
PROT SERPL-MCNC: 7.4 G/DL (ref 6–8.5)
PSA SERPL-MCNC: 10.8 NG/ML (ref 0–4)
RBC # BLD AUTO: 4.64 10*6/MM3 (ref 4.14–5.8)
SODIUM SERPL-SCNC: 140 MMOL/L (ref 136–145)
TRIGL SERPL-MCNC: 63 MG/DL (ref 0–150)
TSH SERPL DL<=0.05 MIU/L-ACNC: 2.58 UIU/ML (ref 0.27–4.2)
VLDLC SERPL-MCNC: 12 MG/DL (ref 5–40)
WBC NRBC COR # BLD: 7.11 10*3/MM3 (ref 3.4–10.8)

## 2022-09-01 PROCEDURE — 84443 ASSAY THYROID STIM HORMONE: CPT | Performed by: NURSE PRACTITIONER

## 2022-09-01 PROCEDURE — 85025 COMPLETE CBC W/AUTO DIFF WBC: CPT | Performed by: NURSE PRACTITIONER

## 2022-09-01 PROCEDURE — 80061 LIPID PANEL: CPT | Performed by: NURSE PRACTITIONER

## 2022-09-01 PROCEDURE — 84153 ASSAY OF PSA TOTAL: CPT | Performed by: NURSE PRACTITIONER

## 2022-09-01 PROCEDURE — 99396 PREV VISIT EST AGE 40-64: CPT | Performed by: NURSE PRACTITIONER

## 2022-09-01 PROCEDURE — 99213 OFFICE O/P EST LOW 20 MIN: CPT | Performed by: NURSE PRACTITIONER

## 2022-09-01 PROCEDURE — 80053 COMPREHEN METABOLIC PANEL: CPT | Performed by: NURSE PRACTITIONER

## 2022-09-01 PROCEDURE — 82306 VITAMIN D 25 HYDROXY: CPT | Performed by: NURSE PRACTITIONER

## 2022-09-01 PROCEDURE — 86803 HEPATITIS C AB TEST: CPT | Performed by: NURSE PRACTITIONER

## 2022-09-01 RX ORDER — OXYBUTYNIN CHLORIDE 10 MG/1
10 TABLET, EXTENDED RELEASE ORAL DAILY
Qty: 90 TABLET | Refills: 1 | Status: SHIPPED | OUTPATIENT
Start: 2022-09-01 | End: 2023-03-08 | Stop reason: SDUPTHER

## 2022-09-01 RX ORDER — TRAZODONE HYDROCHLORIDE 50 MG/1
50 TABLET ORAL NIGHTLY
Qty: 90 TABLET | Refills: 1 | Status: SHIPPED | OUTPATIENT
Start: 2022-09-01 | End: 2023-03-08 | Stop reason: SDUPTHER

## 2022-09-01 RX ORDER — TAMSULOSIN HYDROCHLORIDE 0.4 MG/1
1 CAPSULE ORAL DAILY
Qty: 90 CAPSULE | Refills: 1 | Status: SHIPPED | OUTPATIENT
Start: 2022-09-01 | End: 2023-03-08 | Stop reason: SDUPTHER

## 2022-09-01 RX ORDER — CELECOXIB 200 MG/1
200 CAPSULE ORAL DAILY
Qty: 90 CAPSULE | Refills: 1 | Status: SHIPPED | OUTPATIENT
Start: 2022-09-01 | End: 2023-03-08 | Stop reason: SDUPTHER

## 2022-09-01 RX ORDER — ATORVASTATIN CALCIUM 80 MG/1
80 TABLET, FILM COATED ORAL DAILY
Qty: 90 TABLET | Refills: 1 | Status: SHIPPED | OUTPATIENT
Start: 2022-09-01 | End: 2023-03-08 | Stop reason: SDUPTHER

## 2022-09-01 RX ORDER — OMEPRAZOLE 20 MG/1
20 CAPSULE, DELAYED RELEASE ORAL DAILY
Qty: 90 CAPSULE | Refills: 1 | Status: SHIPPED | OUTPATIENT
Start: 2022-09-01 | End: 2023-03-08 | Stop reason: SDUPTHER

## 2022-09-01 RX ORDER — PREDNISONE 20 MG/1
TABLET ORAL
COMMUNITY
End: 2023-03-08

## 2022-09-01 NOTE — PROGRESS NOTES
"Chief Complaint  Follow-up and Med Refill (atorvastatin (Lipitor) 80 MG tablet/celecoxib (CeleBREX) 200 MG capsule/omeprazole (priLOSEC) 20 MG capsule/oxybutynin XL (DITROPAN-XL) 10 MG 24 hr tablet/tamsulosin (FLOMAX) 0.4 MG capsule 24 hr capsule/traZODone (DESYREL) 50 MG tablet)    Subjective          Medical History: has a past medical history of Diverticulosis (2020), GERD (gastroesophageal reflux disease), Hernia cerebri (HCC), Hyperlipidemia, Insomnia, Kidney stone, Low back pain, and Thoracic disc disorder.     Surgical History: has a past surgical history that includes Colonoscopy (07/20/2017); Umbilical hernia repair; [place] tooth extraction; and Epidural block injection.     Family History: family history includes Breast cancer in an other family member; Cancer in his father and mother; Stroke in an other family member.     Social History: reports that he has never smoked. He has never used smokeless tobacco. He reports that he does not drink alcohol and does not use drugs.    Andrew Mirnada presents to [facility]    ENCOUNTER DATE:  09/01/2022    Andrew Miranda / 62 y.o. / male      CHIEF COMPLAINT    Follow-up and Med Refill (atorvastatin (Lipitor) 80 MG tablet/celecoxib (CeleBREX) 200 MG capsule/omeprazole (priLOSEC) 20 MG capsule/oxybutynin XL (DITROPAN-XL) 10 MG 24 hr tablet/tamsulosin (FLOMAX) 0.4 MG capsule 24 hr capsule/traZODone (DESYREL) 50 MG tablet)      VITALS    /76 (BP Location: Right arm, Patient Position: Sitting, Cuff Size: Adult)   Pulse 52   Temp 97.5 °F (36.4 °C) (Infrared)   Ht 177.8 cm (70\")   Wt 78 kg (172 lb)   SpO2 98%   BMI 24.68 kg/m²     BP Readings from Last 3 Encounters:  09/01/22 : 112/76  08/05/22 : 120/68  03/09/22 : 126/86    Wt Readings from Last 3 Encounters:  09/01/22 : 78 kg (172 lb)  08/05/22 : 78 kg (172 lb)  03/09/22 : 80.1 kg (176 lb 9.6 oz)    Body mass index is 24.68 kg/m².    MEDICATIONS    Current Outpatient " Medications on File Prior to Visit:  atorvastatin (Lipitor) 80 MG tablet, Take 1 tablet by mouth Daily., Disp: 90 tablet, Rfl: 1  celecoxib (CeleBREX) 200 MG capsule, Take 1 capsule by mouth Daily., Disp: 90 capsule, Rfl: 1  omeprazole (priLOSEC) 20 MG capsule, Take 1 capsule by mouth Daily., Disp: 90 capsule, Rfl: 1  oxybutynin XL (DITROPAN-XL) 10 MG 24 hr tablet, Take 1 tablet by mouth Daily., Disp: 90 tablet, Rfl: 1  oxyCODONE-acetaminophen (Percocet) 5-325 MG per tablet, Take 1 tablet by mouth Every 6 (Six) Hours As Needed for Severe Pain ., Disp: 12 tablet, Rfl: 0  predniSONE (DELTASONE) 20 MG tablet, prednisone 20 mg tablet, Disp: , Rfl:   tamsulosin (FLOMAX) 0.4 MG capsule 24 hr capsule, Take 1 capsule by mouth Daily., Disp: 90 capsule, Rfl: 1  traZODone (DESYREL) 50 MG tablet, Take 1 tablet by mouth Every Night., Disp: 90 tablet, Rfl: 1    No current facility-administered medications on file prior to visit.        HISTORY OF PRESENT ILLNESS    Andrew presents for annual health maintenance visit.    Last health maintenance visit: approximately 1 year ago  General health: some medical problems  Lifestyle:  Attempting to lose weight?: Yes   Diet: eats moderately healthy  Exercise: very active at work/home  Tobacco: Never used   Alcohol: does not drink  Work: Retired      Depression Screening:       PHQ-2: 0 (Not depressed)  PHQ-9: 0 (Negative screening for depression)    Patient Care Team:  Nasra Burton APRN as PCP - General (Nurse Practitioner)      ALLERGIES  No Known Allergies     PFSH:     The following portions of the patient's history were reviewed and updated as appropriate: Allergies / Current Medications / Past Medical History / Surgical History / Social History / Family History    PROBLEM LIST   There is no problem list on file for this patient.      PAST MEDICAL HISTORY  Past Medical History:  2020: Diverticulosis  No date: GERD (gastroesophageal reflux disease)  No date: Hernia cerebri  (HCC)  No date: Hyperlipidemia  No date: Insomnia  No date: Kidney stone  No date: Low back pain  No date: Thoracic disc disorder    SURGICAL HISTORY  Past Surgical History:  07/20/2017: COLONOSCOPY     Comment:  [facility]- Repeat in 10 years.   No date: EPIDURAL BLOCK  No date: UMBILICAL HERNIA REPAIR  No date: WISDOM TOOTH EXTRACTION    SOCIAL HISTORY  Social History   Socioeconomic History     Marital status:    Tobacco Use     Smoking status: Never Smoker     Smokeless tobacco: Never Used   Vaping Use     Vaping Use: Never used   Substance and Sexual Activity     Alcohol use: Never     Drug use: Never     Sexual activity: Yes       Partners: Female       Birth control/protection: Surgical, None       Comment: Vasectomy      FAMILY HISTORY  Review of patient's family history indicates:  Problem: Breast cancer     Relation: Other         Age of Onset: (Not Specified)  Problem: Stroke     Relation: Other         Age of Onset: (Not Specified)  Problem: Cancer     Relation: Mother         Age of Onset: (Not Specified)  Problem: Cancer     Relation: Father         Age of Onset: (Not Specified)      IMMUNIZATION HISTORY    Immunization History  Administered            Date(s) Administered   COVID-19 (MODERNA) 1st, 2nd, 3rd Dose Only                         03/11/2021 04/08/2021     Shingrix              04/15/2021          Labs:    Lab Results      Component                Value               Date                      NA                       139                 03/09/2022                K                        4.3                 03/09/2022                CALCIUM                  9.9                 03/09/2022                AST                      25                  03/09/2022                ALT                      25                  03/09/2022                BUN                      17                  03/09/2022                CREATININE               1.13                03/09/2022                 CREATININE               0.90                09/08/2021                CREATININE               1.10                03/08/2021                EGFRIFNONA               85                  09/08/2021                EGFRIFAFRI               103                 09/08/2021              Lab Results      Component                Value               Date                      TSH                      3.120               03/09/2022              Lab Results      Component                Value               Date                      LDL                      81                  03/09/2022                HDL                      52                  03/09/2022                TRIG                     85                  03/09/2022                CHOLHDLRATIO             2.9 (L)             03/08/2021              Lab Results      Component                Value               Date                      UEJR13BF                 47.8                09/08/2021               Lab Results      Component                Value               Date                      WBC                      7.10                03/09/2022                HGB                      15.4                03/09/2022                MCV                      93.2                03/09/2022                PLT                      248                 03/09/2022              No results found for: PROTEIN, GLUCOSEU, BLOODU, NITRITEU, LEUKOCYTESUR    No results found for: HEPCVIRUSABY        ASSESSMENT & PLAN    ANNUAL WELLNESS EXAM / PHYSICAL     HEALTHCARE MAINTENANCE ISSUES    Cancer Screening:  Colon: Initial/Next screening at age: CURRENT  Repeat colon cancer screening: every 10 years      Lifestyle Counseling:  Lifestyle Modifications: Continue good lifestyle choices/modifications  Safety Issues: Always wear seatbelt, Avoid texting while driving   Use sunscreen, regular skin examination  Recommended annual dental/vision examination.  Emotional/Stress/Sleep:  "Reviewed and  given when appropriate      Hyperlipidemia  This is a chronic problem. The current episode started more than 1 year ago. The problem is controlled. He has no history of diabetes or obesity. Current antihyperlipidemic treatment includes statins and diet change. The current treatment provides moderate improvement of lipids. There are no compliance problems.  Risk factors for coronary artery disease include dyslipidemia, family history and male sex.       Heartburn  He complains of heartburn. This is a chronic problem. The current episode started more than 1 year ago. The heartburn is located in the substernum. The heartburn does not wake him from sleep. The heartburn does not limit his activity. The heartburn doesn't change with position. The symptoms are aggravated by certain foods and lying down. There are no known risk factors. The treatment provided significant relief.       Benign Prostatic Hypertrophy  This is a chronic problem. The current episode started more than 1 year ago. Obstructive symptoms do not include dribbling, incomplete emptying or straining. Nothing aggravates the symptoms. Past treatments include tamsulosin. The treatment provided significant relief. He has been using treatment for 2 or more years.       Objective   Vital Signs:   /76 (BP Location: Right arm, Patient Position: Sitting, Cuff Size: Adult)   Pulse 52   Temp 97.5 °F (36.4 °C) (Infrared)   Ht 177.8 cm (70\")   Wt 78 kg (172 lb)   SpO2 98%   BMI 24.68 kg/m²     Physical Exam  Vitals reviewed.   Constitutional:       Appearance: Normal appearance. He is well-developed.   HENT:      Head: Normocephalic and atraumatic.   Eyes:      Conjunctiva/sclera: Conjunctivae normal.      Pupils: Pupils are equal, round, and reactive to light.   Cardiovascular:      Rate and Rhythm: Normal rate and regular rhythm.      Heart sounds: No murmur heard.  Pulmonary:      Effort: Pulmonary effort is normal.      Breath " sounds: Normal breath sounds. No wheezing or rhonchi.   Skin:     General: Skin is warm and dry.   Neurological:      Mental Status: He is alert and oriented to person, place, and time.   Psychiatric:         Mood and Affect: Mood and affect normal.         Behavior: Behavior normal.         Thought Content: Thought content normal.         Judgment: Judgment normal.        Result Review :   The following data was reviewed by: ZANE Maciel on 09/01/2022:  Common labs    Common Labsle 9/8/21 9/8/21 9/8/21 3/9/22 3/9/22 3/9/22 3/9/22    1139 1139 1139 1049 1049 1049 1049   Glucose   88   97    BUN   19   17    Creatinine   0.90   1.13    eGFR Non  Am   85       eGFR African Am   103       Sodium   139   139    Potassium   4.2   4.3    Chloride   104   102    Calcium   9.5   9.9    Albumin   4.50   4.90    Total Bilirubin   0.8   1.1    Alkaline Phosphatase   65   62    AST (SGOT)   23   25    ALT (SGPT)   17   25    WBC 5.99   7.10      Hemoglobin 14.2   15.4      Hematocrit 42.5   45.5      Platelets 257   248      Total Cholesterol  155   149     Triglycerides  56   85     HDL Cholesterol  52   52     LDL Cholesterol   91   81     PSA       5.100 (A)   (A) Abnormal value            Data reviewed: Previous office note            Current Outpatient Medications on File Prior to Visit   Medication Sig Dispense Refill   • oxyCODONE-acetaminophen (Percocet) 5-325 MG per tablet Take 1 tablet by mouth Every 6 (Six) Hours As Needed for Severe Pain . 12 tablet 0   • predniSONE (DELTASONE) 20 MG tablet prednisone 20 mg tablet     • [DISCONTINUED] atorvastatin (Lipitor) 80 MG tablet Take 1 tablet by mouth Daily. 90 tablet 1   • [DISCONTINUED] celecoxib (CeleBREX) 200 MG capsule Take 1 capsule by mouth Daily. 90 capsule 1   • [DISCONTINUED] omeprazole (priLOSEC) 20 MG capsule Take 1 capsule by mouth Daily. 90 capsule 1   • [DISCONTINUED] oxybutynin XL (DITROPAN-XL) 10 MG 24 hr tablet Take 1 tablet by mouth  Daily. 90 tablet 1   • [DISCONTINUED] tamsulosin (FLOMAX) 0.4 MG capsule 24 hr capsule Take 1 capsule by mouth Daily. 90 capsule 1   • [DISCONTINUED] traZODone (DESYREL) 50 MG tablet Take 1 tablet by mouth Every Night. 90 tablet 1     No current facility-administered medications on file prior to visit.        Assessment and Plan    Diagnoses and all orders for this visit:    1. Annual physical exam (Primary)  -     TSH    2. Encounter for hepatitis C screening test for low risk patient  Comments:  We will do a one-time screening for hepatitis C.  Orders:  -     Hepatitis C antibody    3. Mixed hyperlipidemia  Comments:  Recheck labs, adjust medication if needed  Orders:  -     CBC & Differential  -     Comprehensive Metabolic Panel  -     Lipid Panel    4. Polyarthralgia  Comments:  Only on Celebrex, stable also sees pain management for chronic back pain.  We will continue to monitor patient's progress    5. Elevated prostate specific antigen (PSA)  Comments:  Elevated PSA, will repeat for diagnostic PSA if remains elevated will refer over to urology.  Orders:  -     PSA DIAGNOSTIC    6. Vitamin D deficiency  -     Vitamin D 25 Hydroxy    Other orders  -     traZODone (DESYREL) 50 MG tablet; Take 1 tablet by mouth Every Night.  Dispense: 90 tablet; Refill: 1  -     tamsulosin (FLOMAX) 0.4 MG capsule 24 hr capsule; Take 1 capsule by mouth Daily.  Dispense: 90 capsule; Refill: 1  -     oxybutynin XL (DITROPAN-XL) 10 MG 24 hr tablet; Take 1 tablet by mouth Daily.  Dispense: 90 tablet; Refill: 1  -     omeprazole (priLOSEC) 20 MG capsule; Take 1 capsule by mouth Daily.  Dispense: 90 capsule; Refill: 1  -     celecoxib (CeleBREX) 200 MG capsule; Take 1 capsule by mouth Daily.  Dispense: 90 capsule; Refill: 1  -     atorvastatin (Lipitor) 80 MG tablet; Take 1 tablet by mouth Daily.  Dispense: 90 tablet; Refill: 1        Follow Up   Return in about 6 months (around 3/1/2023) for Recheck.  Patient was given instructions and  counseling regarding his condition or for health maintenance advice. Please see specific information pulled into the AVS if appropriate.

## 2022-09-09 DIAGNOSIS — R97.20 ELEVATED PSA, BETWEEN 10 AND LESS THAN 20 NG/ML: Primary | ICD-10-CM

## 2022-09-19 ENCOUNTER — OFFICE VISIT (OUTPATIENT)
Dept: UROLOGY | Facility: CLINIC | Age: 62
End: 2022-09-19

## 2022-09-19 VITALS
BODY MASS INDEX: 24.62 KG/M2 | TEMPERATURE: 98.6 F | WEIGHT: 172 LBS | SYSTOLIC BLOOD PRESSURE: 137 MMHG | HEART RATE: 63 BPM | HEIGHT: 70 IN | DIASTOLIC BLOOD PRESSURE: 79 MMHG

## 2022-09-19 DIAGNOSIS — N40.1 BPH WITH OBSTRUCTION/LOWER URINARY TRACT SYMPTOMS: ICD-10-CM

## 2022-09-19 DIAGNOSIS — N13.8 BPH WITH OBSTRUCTION/LOWER URINARY TRACT SYMPTOMS: ICD-10-CM

## 2022-09-19 DIAGNOSIS — R97.20 ELEVATED PROSTATE SPECIFIC ANTIGEN (PSA): Primary | ICD-10-CM

## 2022-09-19 DIAGNOSIS — N40.2 PROSTATIC NODULE: ICD-10-CM

## 2022-09-19 PROCEDURE — 99202 OFFICE O/P NEW SF 15 MIN: CPT | Performed by: UROLOGY

## 2022-09-19 NOTE — PROGRESS NOTES
"Chief Complaint  Elevated PSA    BPH    Subjective  No acute distress        Andrew Miranda presents to [facility]  History of Present Illness    62-year-old white male was found to have elevated PSA.  Patient does have some voiding difficulties but since he was started on Ditropan XL 10 mg and tamsulosin 0.4 mg he is doing very well.  His PSA on 9/2/2020 was 3.78 but on March 9, 2022 went up to 5.100.  On 9/1/2022 PSA is 10.800.  Patient was not exposed to agent orange.  No history of prostate cancer in the family.    Objective No acute distress  Vital Signs:   /79   Pulse 63   Temp 98.6 °F (37 °C)   Ht 177.8 cm (70\")   Wt 78 kg (172 lb)   BMI 24.68 kg/m²     No Known Allergies   Past medical history:  has a past medical history of Diverticulosis (2020), Elevated PSA (9/1/2022), GERD (gastroesophageal reflux disease), Hernia cerebri (HCC), Hyperlipidemia, Insomnia, Kidney stone, Low back pain, and Thoracic disc disorder.   Past surgical history:  has a past surgical history that includes Colonoscopy (07/20/2017); Umbilical hernia repair; [place] tooth extraction; Epidural block injection; Lithotripsy; and Vasectomy.  Personal history: family history includes Breast cancer in an other family member; Cancer in his father and mother; Stroke in an other family member.  Social history:  reports that he has never smoked. He has never used smokeless tobacco. He reports that he does not drink alcohol and does not use drugs.    Review of Systems    Please see past medical surgical history rest of the system is negative    Physical Exam  Constitutional:       General: He is not in acute distress.     Appearance: Normal appearance. He is normal weight. He is not ill-appearing or toxic-appearing.   HENT:      Head: Normocephalic and atraumatic.      Ears:      Comments: No hearing loss  Cardiovascular:      Rate and Rhythm: Normal rate and regular rhythm.      Pulses: Normal pulses.      " Heart sounds: Normal heart sounds. No murmur heard.  Pulmonary:      Effort: Pulmonary effort is normal.      Breath sounds: Normal breath sounds. No rhonchi or rales.   Abdominal:      General: Abdomen is flat.      Palpations: Abdomen is soft. There is no mass.      Tenderness: There is no abdominal tenderness. There is no right CVA tenderness or left CVA tenderness.      Hernia: A hernia is present.      Comments: Left inguinal hernia   Genitourinary:     Rectum: Normal.      Comments: Penis is normal.    Right and left scrotum is normal.    Right and left testicle and epididymis is normal.    TIESHA.  Left lobe of prostate is normal and small but the right side is markedly enlarged with nodules right on the lateral side of the prostatic lobe.  Musculoskeletal:         General: No swelling. Normal range of motion.      Cervical back: Normal range of motion and neck supple. No rigidity or tenderness.   Lymphadenopathy:      Cervical: No cervical adenopathy.   Skin:     General: Skin is warm.      Coloration: Skin is not jaundiced.   Neurological:      General: No focal deficit present.      Mental Status: He is alert and oriented to person, place, and time.      Motor: No weakness.      Gait: Gait normal.   Psychiatric:         Mood and Affect: Mood normal.         Behavior: Behavior normal.         Thought Content: Thought content normal.         Judgment: Judgment normal.        Result Review :                 Assessment and Plan    Diagnoses and all orders for this visit:    1. Elevated prostate specific antigen (PSA) (Primary)    2. BPH with obstruction/lower urinary tract symptoms    3. Prostatic nodule    Patient definitely has something wrong on the right lobe of the prostate.  I gave him options to have ultrasound of prostate and biopsies or MRI of the prostate.  Right now MRI of the prostate is the standard of care and he request to have MRI of prostate done.  We are going to arrange that and will see him  after the MRI of prostate     Brief Urine Lab Results     None           Follow Up   No follow-ups on file.  Patient was given instructions and counseling regarding his condition or for health maintenance advice. Please see specific information pulled into the AVS if appropriate.     Anita Serrano MD

## 2022-09-22 ENCOUNTER — PATIENT ROUNDING (BHMG ONLY) (OUTPATIENT)
Dept: UROLOGY | Facility: CLINIC | Age: 62
End: 2022-09-22

## 2022-09-22 NOTE — PROGRESS NOTES
A [employer] message has been sent to the patient for patient rounding with [facility]

## 2022-10-26 ENCOUNTER — HOSPITAL ENCOUNTER (OUTPATIENT)
Dept: MRI IMAGING | Facility: HOSPITAL | Age: 62
Discharge: HOME | End: 2022-10-26
Admitting: UROLOGY

## 2022-10-26 DIAGNOSIS — R97.20 ELEVATED PROSTATE SPECIFIC ANTIGEN (PSA): ICD-10-CM

## 2022-10-26 DIAGNOSIS — N40.2 PROSTATIC NODULE: ICD-10-CM

## 2022-10-26 PROCEDURE — 0 GADOBENATE DIMEGLUMINE 529 MG/ML SOLUTION: Performed by: UROLOGY

## 2022-10-26 PROCEDURE — A9577 INJ MULTIHANCE: HCPCS | Performed by: UROLOGY

## 2022-10-26 PROCEDURE — 72197 MRI PELVIS W/O & W/DYE: CPT

## 2022-10-26 PROCEDURE — 82565 ASSAY OF CREATININE: CPT

## 2022-10-26 RX ADMIN — GADOBENATE DIMEGLUMINE 15 ML: 529 INJECTION, SOLUTION INTRAVENOUS at 19:28

## 2022-10-27 LAB — CREAT BLDA-MCNC: 1 MG/DL (ref 0.6–1.3)

## 2023-03-08 ENCOUNTER — OFFICE VISIT (OUTPATIENT)
Dept: FAMILY MEDICINE CLINIC | Facility: CLINIC | Age: 63
End: 2023-03-08
Payer: OTHER GOVERNMENT

## 2023-03-08 VITALS
OXYGEN SATURATION: 97 % | SYSTOLIC BLOOD PRESSURE: 122 MMHG | TEMPERATURE: 96.6 F | HEIGHT: 70 IN | DIASTOLIC BLOOD PRESSURE: 84 MMHG | HEART RATE: 52 BPM | WEIGHT: 183.6 LBS | BODY MASS INDEX: 26.28 KG/M2

## 2023-03-08 DIAGNOSIS — E78.2 MIXED HYPERLIPIDEMIA: ICD-10-CM

## 2023-03-08 DIAGNOSIS — E55.9 VITAMIN D DEFICIENCY: ICD-10-CM

## 2023-03-08 DIAGNOSIS — N40.1 BENIGN PROSTATIC HYPERPLASIA WITH NOCTURIA: ICD-10-CM

## 2023-03-08 DIAGNOSIS — G89.29 CHRONIC MIDLINE LOW BACK PAIN WITHOUT SCIATICA: ICD-10-CM

## 2023-03-08 DIAGNOSIS — M51.36 DDD (DEGENERATIVE DISC DISEASE), LUMBAR: ICD-10-CM

## 2023-03-08 DIAGNOSIS — Z13.29 THYROID DISORDER SCREENING: ICD-10-CM

## 2023-03-08 DIAGNOSIS — R35.1 BENIGN PROSTATIC HYPERPLASIA WITH NOCTURIA: ICD-10-CM

## 2023-03-08 DIAGNOSIS — Z00.00 ANNUAL PHYSICAL EXAM: Primary | ICD-10-CM

## 2023-03-08 DIAGNOSIS — M54.50 CHRONIC MIDLINE LOW BACK PAIN WITHOUT SCIATICA: ICD-10-CM

## 2023-03-08 DIAGNOSIS — R97.20 ELEVATED PSA MEASUREMENT: ICD-10-CM

## 2023-03-08 LAB
25(OH)D3 SERPL-MCNC: 59.4 NG/ML (ref 30–100)
ALBUMIN SERPL-MCNC: 4.6 G/DL (ref 3.5–5.2)
ALBUMIN/GLOB SERPL: 1.4 G/DL
ALP SERPL-CCNC: 68 U/L (ref 39–117)
ALT SERPL W P-5'-P-CCNC: 26 U/L (ref 1–41)
ANION GAP SERPL CALCULATED.3IONS-SCNC: 5.9 MMOL/L (ref 5–15)
AST SERPL-CCNC: 33 U/L (ref 1–40)
BASOPHILS # BLD AUTO: 0.05 10*3/MM3 (ref 0–0.2)
BASOPHILS NFR BLD AUTO: 0.8 % (ref 0–1.5)
BILIRUB SERPL-MCNC: 1.4 MG/DL (ref 0–1.2)
BUN SERPL-MCNC: 16 MG/DL (ref 8–23)
BUN/CREAT SERPL: 15 (ref 7–25)
CALCIUM SPEC-SCNC: 9.8 MG/DL (ref 8.6–10.5)
CHLORIDE SERPL-SCNC: 104 MMOL/L (ref 98–107)
CHOLEST SERPL-MCNC: 152 MG/DL (ref 0–200)
CO2 SERPL-SCNC: 28.1 MMOL/L (ref 22–29)
CREAT SERPL-MCNC: 1.07 MG/DL (ref 0.76–1.27)
DEPRECATED RDW RBC AUTO: 43.3 FL (ref 37–54)
EGFRCR SERPLBLD CKD-EPI 2021: 77.5 ML/MIN/1.73
EOSINOPHIL # BLD AUTO: 0.28 10*3/MM3 (ref 0–0.4)
EOSINOPHIL NFR BLD AUTO: 4.3 % (ref 0.3–6.2)
ERYTHROCYTE [DISTWIDTH] IN BLOOD BY AUTOMATED COUNT: 12.4 % (ref 12.3–15.4)
GLOBULIN UR ELPH-MCNC: 3.2 GM/DL
GLUCOSE SERPL-MCNC: 107 MG/DL (ref 65–99)
HCT VFR BLD AUTO: 41.9 % (ref 37.5–51)
HDLC SERPL-MCNC: 61 MG/DL (ref 40–60)
HGB BLD-MCNC: 14.4 G/DL (ref 13–17.7)
IMM GRANULOCYTES # BLD AUTO: 0.02 10*3/MM3 (ref 0–0.05)
IMM GRANULOCYTES NFR BLD AUTO: 0.3 % (ref 0–0.5)
LDLC SERPL CALC-MCNC: 82 MG/DL (ref 0–100)
LDLC/HDLC SERPL: 1.36 {RATIO}
LYMPHOCYTES # BLD AUTO: 1.8 10*3/MM3 (ref 0.7–3.1)
LYMPHOCYTES NFR BLD AUTO: 27.9 % (ref 19.6–45.3)
MCH RBC QN AUTO: 32.6 PG (ref 26.6–33)
MCHC RBC AUTO-ENTMCNC: 34.4 G/DL (ref 31.5–35.7)
MCV RBC AUTO: 94.8 FL (ref 79–97)
MONOCYTES # BLD AUTO: 0.7 10*3/MM3 (ref 0.1–0.9)
MONOCYTES NFR BLD AUTO: 10.9 % (ref 5–12)
NEUTROPHILS NFR BLD AUTO: 3.6 10*3/MM3 (ref 1.7–7)
NEUTROPHILS NFR BLD AUTO: 55.8 % (ref 42.7–76)
NRBC BLD AUTO-RTO: 0 /100 WBC (ref 0–0.2)
PLATELET # BLD AUTO: 289 10*3/MM3 (ref 140–450)
PMV BLD AUTO: 10.5 FL (ref 6–12)
POTASSIUM SERPL-SCNC: 4.5 MMOL/L (ref 3.5–5.2)
PROT SERPL-MCNC: 7.8 G/DL (ref 6–8.5)
PSA SERPL-MCNC: 5.9 NG/ML (ref 0–4)
RBC # BLD AUTO: 4.42 10*6/MM3 (ref 4.14–5.8)
SODIUM SERPL-SCNC: 138 MMOL/L (ref 136–145)
TRIGL SERPL-MCNC: 41 MG/DL (ref 0–150)
TSH SERPL DL<=0.05 MIU/L-ACNC: 2.69 UIU/ML (ref 0.27–4.2)
VLDLC SERPL-MCNC: 9 MG/DL (ref 5–40)
WBC NRBC COR # BLD: 6.45 10*3/MM3 (ref 3.4–10.8)

## 2023-03-08 PROCEDURE — 84443 ASSAY THYROID STIM HORMONE: CPT | Performed by: NURSE PRACTITIONER

## 2023-03-08 PROCEDURE — 85025 COMPLETE CBC W/AUTO DIFF WBC: CPT | Performed by: NURSE PRACTITIONER

## 2023-03-08 PROCEDURE — 99396 PREV VISIT EST AGE 40-64: CPT | Performed by: NURSE PRACTITIONER

## 2023-03-08 PROCEDURE — 99213 OFFICE O/P EST LOW 20 MIN: CPT | Performed by: NURSE PRACTITIONER

## 2023-03-08 PROCEDURE — 82306 VITAMIN D 25 HYDROXY: CPT | Performed by: NURSE PRACTITIONER

## 2023-03-08 PROCEDURE — 80061 LIPID PANEL: CPT | Performed by: NURSE PRACTITIONER

## 2023-03-08 PROCEDURE — 84153 ASSAY OF PSA TOTAL: CPT | Performed by: NURSE PRACTITIONER

## 2023-03-08 PROCEDURE — 80053 COMPREHEN METABOLIC PANEL: CPT | Performed by: NURSE PRACTITIONER

## 2023-03-08 RX ORDER — OXYBUTYNIN CHLORIDE 10 MG/1
10 TABLET, EXTENDED RELEASE ORAL DAILY
Qty: 90 TABLET | Refills: 1 | Status: SHIPPED | OUTPATIENT
Start: 2023-03-08

## 2023-03-08 RX ORDER — OMEPRAZOLE 20 MG/1
20 CAPSULE, DELAYED RELEASE ORAL DAILY
Qty: 90 CAPSULE | Refills: 1 | Status: SHIPPED | OUTPATIENT
Start: 2023-03-08

## 2023-03-08 RX ORDER — ATORVASTATIN CALCIUM 80 MG/1
80 TABLET, FILM COATED ORAL DAILY
Qty: 90 TABLET | Refills: 1 | Status: SHIPPED | OUTPATIENT
Start: 2023-03-08

## 2023-03-08 RX ORDER — CELECOXIB 200 MG/1
200 CAPSULE ORAL DAILY
Qty: 90 CAPSULE | Refills: 1 | Status: SHIPPED | OUTPATIENT
Start: 2023-03-08

## 2023-03-08 RX ORDER — TAMSULOSIN HYDROCHLORIDE 0.4 MG/1
1 CAPSULE ORAL DAILY
Qty: 90 CAPSULE | Refills: 1 | Status: SHIPPED | OUTPATIENT
Start: 2023-03-08

## 2023-03-08 RX ORDER — OXYCODONE HYDROCHLORIDE AND ACETAMINOPHEN 5; 325 MG/1; MG/1
1 TABLET ORAL EVERY 6 HOURS PRN
Qty: 12 TABLET | Refills: 0 | Status: CANCELLED | OUTPATIENT
Start: 2023-03-08

## 2023-03-08 RX ORDER — TRAZODONE HYDROCHLORIDE 50 MG/1
50 TABLET ORAL NIGHTLY
Qty: 90 TABLET | Refills: 1 | Status: SHIPPED | OUTPATIENT
Start: 2023-03-08

## 2023-03-08 NOTE — PROGRESS NOTES
"  ENCOUNTER DATE:  03/08/2023    Andrew Miranda / 62 y.o. / male      CHIEF COMPLAINT     Annual Exam, Diverticulitis, and Back Pain      VITALS     Visit Vitals  /84   Pulse 52   Temp 96.6 °F (35.9 °C)   Ht 177.8 cm (70\")   Wt 83.3 kg (183 lb 9.6 oz)   SpO2 97%   BMI 26.34 kg/m²       BP Readings from Last 3 Encounters:   03/08/23 122/84   09/19/22 137/79   09/01/22 112/76     Wt Readings from Last 3 Encounters:   03/08/23 83.3 kg (183 lb 9.6 oz)   10/26/22 78 kg (172 lb)   09/19/22 78 kg (172 lb)      Body mass index is 26.34 kg/m².    MEDICATIONS     Current Outpatient Medications on File Prior to Visit   Medication Sig Dispense Refill   • oxyCODONE-acetaminophen (Percocet) 5-325 MG per tablet Take 1 tablet by mouth Every 6 (Six) Hours As Needed for Severe Pain . 12 tablet 0   • [DISCONTINUED] atorvastatin (Lipitor) 80 MG tablet Take 1 tablet by mouth Daily. 90 tablet 1   • [DISCONTINUED] celecoxib (CeleBREX) 200 MG capsule Take 1 capsule by mouth Daily. 90 capsule 1   • [DISCONTINUED] omeprazole (priLOSEC) 20 MG capsule Take 1 capsule by mouth Daily. 90 capsule 1   • [DISCONTINUED] oxybutynin XL (DITROPAN-XL) 10 MG 24 hr tablet Take 1 tablet by mouth Daily. 90 tablet 1   • [DISCONTINUED] tamsulosin (FLOMAX) 0.4 MG capsule 24 hr capsule Take 1 capsule by mouth Daily. 90 capsule 1   • [DISCONTINUED] traZODone (DESYREL) 50 MG tablet Take 1 tablet by mouth Every Night. 90 tablet 1   • [DISCONTINUED] predniSONE (DELTASONE) 20 MG tablet prednisone 20 mg tablet       No current facility-administered medications on file prior to visit.         HISTORY OF PRESENT ILLNESS     Andrew presents for annual health maintenance visit.    · Last health maintenance visit: approximately 1 year ago  · General health: some medical problems  · Lifestyle:  · Attempting to lose weight?: No   · Diet: eats decently  · Exercise: walks regularly  · Tobacco: Never used   · Alcohol: occasional/infrequent  · Work: Retired  · Domestic " abuse concerns: No   · Depression Screening:          PHQ-9 Depression Screening  Little interest or pleasure in doing things? 0-->not at all   Feeling down, depressed, or hopeless? 0-->not at all   Trouble falling or staying asleep, or sleeping too much?     Feeling tired or having little energy?     Poor appetite or overeating?     Feeling bad about yourself - or that you are a failure or have let yourself or your family down?     Trouble concentrating on things, such as reading the newspaper or watching television?     Moving or speaking so slowly that other people could have noticed? Or the opposite - being so fidgety or restless that you have been moving around a lot more than usual?     Thoughts that you would be better off dead, or of hurting yourself in some way?     PHQ-9 Total Score 0   If you checked off any problems, how difficult have these problems made it for you to do your work, take care of things at home, or get along with other people?           SALLIE-7           Patient Care Team:  Nasra Burton APRN as PCP - General (Nurse Practitioner)      ALLERGIES  No Known Allergies     PFSH:     The following portions of the patient's history were reviewed and updated as appropriate: Allergies / Current Medications / Past Medical History / Surgical History / Social History / Family History    PROBLEM LIST   There is no problem list on file for this patient.      PAST MEDICAL HISTORY  Past Medical History:   Diagnosis Date   • Diverticulosis 2020   • Elevated PSA 9/1/2022   • GERD (gastroesophageal reflux disease)    • Hernia cerebri (HCC)    • Hyperlipidemia    • Insomnia    • Kidney stone    • Low back pain    • Thoracic disc disorder        SURGICAL HISTORY  Past Surgical History:   Procedure Laterality Date   • COLONOSCOPY  07/20/2017    [facility]- Repeat in 10 years.    • EPIDURAL BLOCK     • LITHOTRIPSY     • UMBILICAL HERNIA REPAIR     • VASECTOMY     • WISDOM TOOTH  EXTRACTION         SOCIAL HISTORY  Social History     Socioeconomic History   • Marital status:    Tobacco Use   • Smoking status: Never   • Smokeless tobacco: Never   Vaping Use   • Vaping Use: Never used   Substance and Sexual Activity   • Alcohol use: Yes     Alcohol/week: 1.0 standard drink     Types: 1 Shots of liquor per week   • Drug use: Never   • Sexual activity: Yes     Partners: Female     Birth control/protection: Surgical, None     Comment: Vasectomy       FAMILY HISTORY  Family History   Problem Relation Age of Onset   • Breast cancer Other    • Stroke Other    • Cancer Mother    • Cancer Father        IMMUNIZATION HISTORY  Immunization History   Administered Date(s) Administered   • COVID-19 (MODERNA) 1st, 2nd, 3rd Dose Only 03/11/2021, 04/08/2021   • Shingrix 04/15/2021         HPI  Hyperlipidemia  This is a chronic problem. The current episode started more than 1 year ago. The problem is controlled. He has no history of diabetes or obesity. Current antihyperlipidemic treatment includes statins and diet change. The current treatment provides moderate improvement of lipids. There are no compliance problems.  Risk factors for coronary artery disease include dyslipidemia, family history and male sex.    Heartburn  He complains of heartburn. This is a chronic problem. The current episode started more than 1 year ago. The heartburn is located in the substernum. The heartburn does not wake him from sleep. The heartburn does not limit his activity. The heartburn doesn't change with position. The symptoms are aggravated by certain foods and lying down. There are no known risk factors. The treatment provided significant relief.    Benign Prostatic Hypertrophy  This is a chronic problem. The current episode started more than 1 year ago. Obstructive symptoms do not include dribbling, incomplete emptying or straining. Nothing aggravates the symptoms. Past treatments include tamsulosin. The treatment  provided significant relief. He has been using treatment for 2 or more years.  Back Pain  This is a chronic problem. The current episode started more than 1 year ago. The problem occurs constantly. The problem has been waxing and waning since onset. The pain is present in the lumbar spine. The quality of the pain is described as aching and stabbing. The pain is at a severity of 4/10. The pain is worse during the day. The symptoms are aggravated by bending, position, sitting and twisting. Stiffness is present all day. Pertinent negatives include no abdominal pain, bladder incontinence, bowel incontinence, chest pain, dysuria, fever, headaches, leg pain, numbness, paresis, paresthesias, pelvic pain, perianal numbness, tingling, weakness or weight loss.   BPH  Patient has current symptoms of BPH with elevated PSA he did have a MRI done in October 2022 that showed severe BPH.  Patient was seeing Dr. Serrano but never received a phone call about the MRI, has not followed back up.  I was discussing with patient his symptoms, he continues to have frequent urination at night along with hesitancy during the day.  Last PSA was 10.2.  Patient would like to see a different provider about his enlarged prostate and get a second opinion on how best to treat it.  We will put a referral in for [facility] Nikita Garrett.  Physical Exam  Vitals reviewed.   Constitutional:       Appearance: Normal appearance. He is well-developed.   HENT:      Head: Normocephalic and atraumatic.      Right Ear: External ear normal.      Left Ear: External ear normal.   Eyes:      Conjunctiva/sclera: Conjunctivae normal.      Pupils: Pupils are equal, round, and reactive to light.   Cardiovascular:      Rate and Rhythm: Normal rate and regular rhythm.      Heart sounds: No murmur heard.  Pulmonary:      Effort: Pulmonary effort is normal.      Breath sounds: Normal breath sounds. No wheezing or rhonchi.   Musculoskeletal:      Right lower leg:  No edema.      Left lower leg: No edema.   Skin:     General: Skin is warm and dry.   Neurological:      Mental Status: He is alert and oriented to person, place, and time.   Psychiatric:         Mood and Affect: Mood and affect normal.         Behavior: Behavior normal.         Thought Content: Thought content normal.         Judgment: Judgment normal.               REVIEWED DATA      Labs:    Lab Results   Component Value Date     09/01/2022    K 4.9 09/01/2022    CALCIUM 9.7 09/01/2022    AST 27 09/01/2022    ALT 24 09/01/2022    BUN 24 (H) 09/01/2022    CREATININE 1.00 10/26/2022    CREATININE 1.07 09/01/2022    CREATININE 1.13 03/09/2022    EGFRIFNONA 85 09/08/2021    EGFRIFAFRI 103 09/08/2021       Lab Results   Component Value Date    TSH 2.580 09/01/2022       Lab Results   Component Value Date     (H) 09/01/2022    HDL 56 09/01/2022    TRIG 63 09/01/2022    CHOLHDLRATIO 2.9 (L) 03/08/2021       Lab Results   Component Value Date    OWKO30ZC 54.1 09/01/2022        Lab Results   Component Value Date    WBC 7.11 09/01/2022    HGB 14.5 09/01/2022    MCV 95.3 09/01/2022     09/01/2022       No results found for: PROTEIN, GLUCOSEU, BLOODU, NITRITEU, LEUKOCYTESUR       Lab Results   Component Value Date    HEPCVIRUSABY Non-Reactive 09/01/2022           ASSESSMENT & PLAN     Diagnoses and all orders for this visit:    1. Annual physical exam (Primary)    2. DDD (degenerative disc disease), lumbar  Comments:  Doing well with pain management and epidural steroid injections we will continue to monitor    3. Chronic midline low back pain without sciatica    4. Elevated PSA measurement  Comments:  [facility], recheck PSA last measured at 10.2.  Orders:  -     PSA DIAGNOSTIC  -     Ambulatory Referral to Urology    5. Mixed hyperlipidemia  Comments:  We will recheck labs, adjust medication if needed  Orders:  -     CBC & Differential  -     Comprehensive Metabolic Panel  -      Lipid Panel    6. Thyroid disorder screening  -     TSH    7. Vitamin D deficiency  -     Vitamin D,25-Hydroxy    8. Benign prostate hyperplasia with nocturia  Comments:  Will refer over [facility]. Nikita Garrett for further work-up and management  Orders:  -     PSA DIAGNOSTIC  -     Ambulatory Referral to Urology    Other orders  -     traZODone (DESYREL) 50 MG tablet; Take 1 tablet by mouth Every Night.  Dispense: 90 tablet; Refill: 1  -     tamsulosin (FLOMAX) 0.4 MG capsule 24 hr capsule; Take 1 capsule by mouth Daily.  Dispense: 90 capsule; Refill: 1  -     oxybutynin XL (DITROPAN-XL) 10 MG 24 hr tablet; Take 1 tablet by mouth Daily.  Dispense: 90 tablet; Refill: 1  -     omeprazole (priLOSEC) 20 MG capsule; Take 1 capsule by mouth Daily.  Dispense: 90 capsule; Refill: 1  -     celecoxib (CeleBREX) 200 MG capsule; Take 1 capsule by mouth Daily.  Dispense: 90 capsule; Refill: 1  -     atorvastatin (Lipitor) 80 MG tablet; Take 1 tablet by mouth Daily.  Dispense: 90 tablet; Refill: 1        HEALTHCARE MAINTENANCE ISSUES     Cancer Screening:  · Colon: Initial/Next screening at age: CURRENT  · Repeat colon cancer screening: every 10 years  · Skin: Monthly self skin examination, annual exam by health professional      Lifestyle Counseling:  · Lifestyle Modifications: Attempt to lose weight, Continue good lifestyle choices/modifications and Improve dietary compliance  · Safety Issues: Always wear seatbelt, Avoid texting while driving   · Use sunscreen, regular skin examination  · Recommended annual dental/vision examination.  · Emotional/Stress/Sleep: Reviewed and  given when appropriate      Part of this note may be electronic transcription/translation of spoken language to printed text using the Dragon dictation system

## 2023-06-16 ENCOUNTER — OFFICE VISIT (OUTPATIENT)
Dept: FAMILY MEDICINE CLINIC | Facility: CLINIC | Age: 63
End: 2023-06-16
Payer: MEDICARE

## 2023-06-16 VITALS
WEIGHT: 181 LBS | HEART RATE: 68 BPM | HEIGHT: 70 IN | SYSTOLIC BLOOD PRESSURE: 106 MMHG | TEMPERATURE: 98.4 F | BODY MASS INDEX: 25.91 KG/M2 | OXYGEN SATURATION: 98 % | DIASTOLIC BLOOD PRESSURE: 72 MMHG

## 2023-06-16 DIAGNOSIS — K40.90 LEFT INGUINAL HERNIA: Primary | ICD-10-CM

## 2023-06-16 NOTE — PROGRESS NOTES
"Chief Complaint  Hernia Concern (Groin, Lt side)    Subjective        Medical History: has a past medical history of Diverticulosis (2020), Elevated PSA (9/1/2022), GERD (gastroesophageal reflux disease), Hernia cerebri, Hyperlipidemia, Insomnia, Kidney stone, Low back pain, and Thoracic disc disorder.     Surgical History: has a past surgical history that includes Colonoscopy (07/20/2017); Umbilical hernia repair; May tooth extraction; Epidural block injection; Lithotripsy; and Vasectomy.     Family History: family history includes Breast cancer in an other family member; Cancer in his father and mother; Stroke in an other family member.     Social History: reports that he has never smoked. He has never used smokeless tobacco. He reports current alcohol use of about 1.0 standard drink per week. He reports that he does not use drugs.    Andrew Miranda presents to [facility]  Abdominal Pain    Concerns for left inguinal hernia, ongoing for last 6-8 months, states that he notice the hernia when he is sitting or riding the lawnmower.  Patient states that she was having trouble pushing the hernia back.  Patient states it can be painful when he can get the hernia pushed back in.  He is has had a previous hernia repair but cannot recall where it was located at.  He also would like the right inguinal area checked, he feels like it started to herniate as well.  Denies any trouble urinating or defecating.  No abdominal pain nausea or vomiting.    Objective   Vital Signs:   /72 (BP Location: Right arm, Patient Position: Sitting, Cuff Size: Adult)   Pulse 68   Temp 98.4 °F (36.9 °C) (Temporal)   Ht 177.8 cm (70\")   Wt 82.1 kg (181 lb)   SpO2 98%   BMI 25.97 kg/m²       Wt Readings from Last 3 Encounters:   06/16/23 82.1 kg (181 lb)   03/08/23 83.3 kg (183 lb 9.6 oz)   10/26/22 78 kg (172 lb)        BP Readings from Last 3 Encounters:   06/16/23 106/72   03/08/23 122/84 "   09/19/22 137/79        BMI is >= 25 and <30. (Overweight) The following options were offered after discussion;: weight loss educational material (shared in after visit summary)       Physical Exam  Vitals reviewed.   Constitutional:       Appearance: Normal appearance. He is well-developed.   HENT:      Head: Normocephalic and atraumatic.   Eyes:      Conjunctiva/sclera: Conjunctivae normal.      Pupils: Pupils are equal, round, and reactive to light.   Cardiovascular:      Rate and Rhythm: Normal rate and regular rhythm.      Heart sounds: No murmur heard.  Pulmonary:      Effort: Pulmonary effort is normal.      Breath sounds: Normal breath sounds. No wheezing or rhonchi.   Abdominal:      Comments: Left inguinal hernia   Skin:     General: Skin is warm and dry.   Neurological:      Mental Status: He is alert and oriented to person, place, and time.   Psychiatric:         Mood and Affect: Mood and affect normal.         Behavior: Behavior normal.         Thought Content: Thought content normal.         Judgment: Judgment normal.      Result Review :  {The following data was reviewed by ZANE Maciel on 06/16/2023.  Common labs          9/1/2022    11:24 10/26/2022    19:06 3/8/2023    11:34   Common Labs   Glucose 98   107    BUN 24   16    Creatinine 1.07  1.00  1.07    Sodium 140   138    Potassium 4.9   4.5    Chloride 104   104    Calcium 9.7   9.8    Albumin 4.40   4.6    Total Bilirubin 1.2   1.4    Alkaline Phosphatase 73   68    AST (SGOT) 27   33    ALT (SGPT) 24   26    WBC 7.11   6.45    Hemoglobin 14.5   14.4    Hematocrit 44.2   41.9    Platelets 436   289    Total Cholesterol 170   152    Triglycerides 63   41    HDL Cholesterol 56   61    LDL Cholesterol  102   82    PSA 10.800   5.900                  Current Outpatient Medications on File Prior to Visit   Medication Sig Dispense Refill    atorvastatin (Lipitor) 80 MG tablet Take 1 tablet by mouth Daily. 90 tablet 1    celecoxib  (CeleBREX) 200 MG capsule Take 1 capsule by mouth Daily. 90 capsule 1    omeprazole (priLOSEC) 20 MG capsule Take 1 capsule by mouth Daily. 90 capsule 1    oxyCODONE-acetaminophen (Percocet) 5-325 MG per tablet Take 1 tablet by mouth Every 6 (Six) Hours As Needed for Severe Pain . 12 tablet 0    tamsulosin (FLOMAX) 0.4 MG capsule 24 hr capsule Take 1 capsule by mouth Daily. 90 capsule 1    traZODone (DESYREL) 50 MG tablet Take 1 tablet by mouth Every Night. 90 tablet 1    [DISCONTINUED] oxybutynin XL (DITROPAN-XL) 10 MG 24 hr tablet Take 1 tablet by mouth Daily. 90 tablet 1     No current facility-administered medications on file prior to visit.        Assessment and Plan  Diagnoses and all orders for this visit:    1. Left inguinal hernia (Primary)  Comments:  Will refer over to general surgery Dr. Reid for further work-up and maintenance.  Orders:  -     Ambulatory Referral to General Surgery    Other orders  -     Cancel: Pneumococcal Conjugate Vaccine 20-Valent (PCV20)        Follow Up   Return for If symptoms do not improve new concerning symptoms.  Patient was given instructions and counseling regarding his condition or for health maintenance advice. Please see specific information pulled into the AVS if appropriate.       Part of this note may be electronic transcription/translation of spoken language to printed text using the Dragon dictation system

## 2023-07-11 PROBLEM — K40.20 BILATERAL INGUINAL HERNIA: Status: ACTIVE | Noted: 2023-07-11

## 2023-07-19 ENCOUNTER — PRE-ADMISSION TESTING (OUTPATIENT)
Dept: PREADMISSION TESTING | Facility: HOSPITAL | Age: 63
End: 2023-07-19
Payer: MEDICARE

## 2023-07-19 VITALS
HEIGHT: 70 IN | BODY MASS INDEX: 26.39 KG/M2 | WEIGHT: 184.3 LBS | OXYGEN SATURATION: 99 % | DIASTOLIC BLOOD PRESSURE: 84 MMHG | SYSTOLIC BLOOD PRESSURE: 132 MMHG | RESPIRATION RATE: 20 BRPM | TEMPERATURE: 97 F | HEART RATE: 51 BPM

## 2023-07-19 LAB
ANION GAP SERPL CALCULATED.3IONS-SCNC: 8.5 MMOL/L (ref 5–15)
BUN SERPL-MCNC: 18 MG/DL (ref 8–23)
BUN/CREAT SERPL: 19.1 (ref 7–25)
CALCIUM SPEC-SCNC: 9.6 MG/DL (ref 8.6–10.5)
CHLORIDE SERPL-SCNC: 106 MMOL/L (ref 98–107)
CO2 SERPL-SCNC: 25.5 MMOL/L (ref 22–29)
CREAT SERPL-MCNC: 0.94 MG/DL (ref 0.76–1.27)
DEPRECATED RDW RBC AUTO: 43.8 FL (ref 37–54)
EGFRCR SERPLBLD CKD-EPI 2021: 90 ML/MIN/1.73
ERYTHROCYTE [DISTWIDTH] IN BLOOD BY AUTOMATED COUNT: 12.8 % (ref 12.3–15.4)
GLUCOSE SERPL-MCNC: 110 MG/DL (ref 65–99)
HCT VFR BLD AUTO: 43.1 % (ref 37.5–51)
HGB BLD-MCNC: 14.4 G/DL (ref 13–17.7)
MCH RBC QN AUTO: 31.6 PG (ref 26.6–33)
MCHC RBC AUTO-ENTMCNC: 33.4 G/DL (ref 31.5–35.7)
MCV RBC AUTO: 94.5 FL (ref 79–97)
PLATELET # BLD AUTO: 264 10*3/MM3 (ref 140–450)
PMV BLD AUTO: 10 FL (ref 6–12)
POTASSIUM SERPL-SCNC: 4.3 MMOL/L (ref 3.5–5.2)
QT INTERVAL: 451 MS
RBC # BLD AUTO: 4.56 10*6/MM3 (ref 4.14–5.8)
SODIUM SERPL-SCNC: 140 MMOL/L (ref 136–145)
WBC NRBC COR # BLD: 7.34 10*3/MM3 (ref 3.4–10.8)

## 2023-07-19 PROCEDURE — 36415 COLL VENOUS BLD VENIPUNCTURE: CPT

## 2023-07-19 PROCEDURE — 93010 ELECTROCARDIOGRAM REPORT: CPT | Performed by: INTERNAL MEDICINE

## 2023-07-19 PROCEDURE — 80048 BASIC METABOLIC PNL TOTAL CA: CPT

## 2023-07-19 PROCEDURE — 85027 COMPLETE CBC AUTOMATED: CPT

## 2023-07-19 PROCEDURE — 93005 ELECTROCARDIOGRAM TRACING: CPT

## 2023-07-19 NOTE — DISCHARGE INSTRUCTIONS
Take the following medications the morning of surgery with a small sip of water:    PRILOSEC      ARRIVE TO MAIN OR DESK 8-2-23 AT 7:00AM    If you are on prescription narcotic pain medication to control your pain you may also take that medication the morning of surgery.    General Instructions:  Do not eat or drink anything after midnight the night before surgery.  Infants may have breast milk up to four hours before surgery.  Infants drinking formula may drink formula up to six hours before surgery.   Patients who avoid smoking, chewing tobacco and alcohol for 4 weeks prior to surgery have a reduced risk of post-operative complications.  Quit smoking as many days before surgery as you can.  Do not smoke, use chewing tobacco or drink alcohol the day of surgery.   If applicable bring your C-PAP/ BI-PAP machine in with you to preop day of surgery.  Bring any papers given to you in the doctor’s office.  Wear clean comfortable clothes.  Do not wear contact lenses, false eyelashes or make-up.  Bring a case for your glasses.   Bring crutches or walker if applicable.  Remove all piercings.  Leave jewelry and any other valuables at home.  Hair extensions with metal clips must be removed prior to surgery.  The Pre-Admission Testing nurse will instruct you to bring medications if unable to obtain an accurate list in Pre-Admission Testing.        Preventing a Surgical Site Infection:  For 2 to 3 days before surgery, avoid shaving with a razor because the razor can irritate skin and make it easier to develop an infection.    Any areas of open skin can increase the risk of a post-operative wound infection by allowing bacteria to enter and travel throughout the body.  Notify your surgeon if you have any skin wounds / rashes even if it is not near the expected surgical site.  The area will need assessed to determine if surgery should be delayed until it is healed.  The night prior to surgery shower using a fresh bar of  anti-bacterial soap (such as Dial) and clean washcloth.  Sleep in a clean bed with clean clothing.  Do not allow pets to sleep with you.  Shower on the morning of surgery using a fresh bar of anti-bacterial soap (such as Dial) and clean washcloth.  Dry with a clean towel and dress in clean clothing.  Ask your surgeon if you will be receiving antibiotics prior to surgery.  Make sure you, your family, and all healthcare providers clean their hands with soap and water or an alcohol based hand  before caring for you or your wound.    Day of surgery:  Your arrival time is approximately two hours before your scheduled surgery time.  Upon arrival, a Pre-op nurse and Anesthesiologist will review your health history, obtain vital signs, and answer questions you may have.  The only belongings needed at this time will be your home medications and if applicable your C-PAP/BI-PAP machine.  A Pre-op nurse will start an IV and you may receive medication in preparation for surgery, including something to help you relax.      Please be aware that surgery does come with discomfort.  We want to make every effort to control your discomfort so please discuss any uncontrolled symptoms with your nurse.   Your doctor will most likely have prescribed pain medications.      If you are going home after surgery you will receive individualized written care instructions before being discharged.  A responsible adult must drive you to and from the hospital on the day of your surgery and stay with you for 24 hours.  Discharge prescriptions can be filled by the hospital pharmacy during regular pharmacy hours.  If you are having surgery late in the day/evening your prescription may be e-prescribed to your pharmacy.  Please verify your pharmacy hours or chose a 24 hour pharmacy to avoid not having access to your prescription because your pharmacy has closed for the day.    If you are staying overnight following surgery, you will be  transported to your hospital room following the recovery period.  [facility] has all private rooms.    If you have any questions please call Pre-Admission Testing at [phone].  Deductibles and co-payments are collected on the day of service. Please be prepared to pay the required co-pay, deductible or deposit on the day of service as defined by your plan.    Call your surgeon immediately if you experience any of the following symptoms:  Sore Throat  Shortness of Breath or difficulty breathing  Cough  Chills  Body soreness or muscle pain  Headache  Fever  New loss of taste or smell  Do not arrive for your surgery ill.  Your procedure will need to be rescheduled to another time.  You will need to call your physician before the day of surgery to avoid any unnecessary exposure to hospital staff as well as other patients.

## 2023-08-02 ENCOUNTER — HOSPITAL ENCOUNTER (OUTPATIENT)
Facility: HOSPITAL | Age: 63
Setting detail: OBSERVATION
Discharge: HOME | End: 2023-08-04
Attending: UROLOGY | Admitting: UROLOGY
Payer: MEDICARE

## 2023-08-02 ENCOUNTER — ANESTHESIA EVENT (OUTPATIENT)
Dept: PERIOP | Facility: HOSPITAL | Age: 63
End: 2023-08-02
Payer: MEDICARE

## 2023-08-02 ENCOUNTER — ANESTHESIA (OUTPATIENT)
Dept: PERIOP | Facility: HOSPITAL | Age: 63
End: 2023-08-02
Payer: MEDICARE

## 2023-08-02 ENCOUNTER — APPOINTMENT (OUTPATIENT)
Dept: ULTRASOUND IMAGING | Facility: HOSPITAL | Age: 63
End: 2023-08-02
Payer: MEDICARE

## 2023-08-02 ENCOUNTER — APPOINTMENT (OUTPATIENT)
Dept: CT IMAGING | Facility: HOSPITAL | Age: 63
End: 2023-08-02
Payer: MEDICARE

## 2023-08-02 DIAGNOSIS — R35.1 NOCTURIA ASSOCIATED WITH BENIGN PROSTATIC HYPERPLASIA: ICD-10-CM

## 2023-08-02 DIAGNOSIS — N40.1 NOCTURIA ASSOCIATED WITH BENIGN PROSTATIC HYPERPLASIA: ICD-10-CM

## 2023-08-02 DIAGNOSIS — N40.1 BPH WITH OBSTRUCTION/LOWER URINARY TRACT SYMPTOMS: Primary | ICD-10-CM

## 2023-08-02 DIAGNOSIS — N13.8 BPH WITH OBSTRUCTION/LOWER URINARY TRACT SYMPTOMS: Primary | ICD-10-CM

## 2023-08-02 LAB
ALBUMIN SERPL-MCNC: 2.3 G/DL (ref 3.5–5.2)
ALBUMIN/GLOB SERPL: 1.3 G/DL
ALP SERPL-CCNC: 41 U/L (ref 39–117)
ALT SERPL W P-5'-P-CCNC: 13 U/L (ref 1–41)
ANION GAP SERPL CALCULATED.3IONS-SCNC: 7 MMOL/L (ref 5–15)
AST SERPL-CCNC: 14 U/L (ref 1–40)
BASOPHILS # BLD AUTO: 0.03 10*3/MM3 (ref 0–0.2)
BASOPHILS NFR BLD AUTO: 0.2 % (ref 0–1.5)
BILIRUB SERPL-MCNC: 0.6 MG/DL (ref 0–1.2)
BUN SERPL-MCNC: 14 MG/DL (ref 8–23)
BUN/CREAT SERPL: 21.5 (ref 7–25)
CALCIUM SPEC-SCNC: 6 MG/DL (ref 8.6–10.5)
CHLORIDE SERPL-SCNC: 118 MMOL/L (ref 98–107)
CO2 SERPL-SCNC: 18 MMOL/L (ref 22–29)
CREAT SERPL-MCNC: 0.65 MG/DL (ref 0.76–1.27)
DEPRECATED RDW RBC AUTO: 41.9 FL (ref 37–54)
EGFRCR SERPLBLD CKD-EPI 2021: 104.6 ML/MIN/1.73
EOSINOPHIL # BLD AUTO: 0 10*3/MM3 (ref 0–0.4)
EOSINOPHIL NFR BLD AUTO: 0 % (ref 0.3–6.2)
ERYTHROCYTE [DISTWIDTH] IN BLOOD BY AUTOMATED COUNT: 12.3 % (ref 12.3–15.4)
GLOBULIN UR ELPH-MCNC: 1.8 GM/DL
GLUCOSE BLDC GLUCOMTR-MCNC: 274 MG/DL (ref 70–130)
GLUCOSE SERPL-MCNC: 160 MG/DL (ref 65–99)
HCT VFR BLD AUTO: 37.7 % (ref 37.5–51)
HGB BLD-MCNC: 13.2 G/DL (ref 13–17.7)
IMM GRANULOCYTES # BLD AUTO: 0.06 10*3/MM3 (ref 0–0.05)
IMM GRANULOCYTES NFR BLD AUTO: 0.4 % (ref 0–0.5)
LYMPHOCYTES # BLD AUTO: 0.42 10*3/MM3 (ref 0.7–3.1)
LYMPHOCYTES NFR BLD AUTO: 3 % (ref 19.6–45.3)
MAGNESIUM SERPL-MCNC: 1.3 MG/DL (ref 1.6–2.4)
MCH RBC QN AUTO: 32.8 PG (ref 26.6–33)
MCHC RBC AUTO-ENTMCNC: 35 G/DL (ref 31.5–35.7)
MCV RBC AUTO: 93.8 FL (ref 79–97)
MONOCYTES # BLD AUTO: 0.54 10*3/MM3 (ref 0.1–0.9)
MONOCYTES NFR BLD AUTO: 3.8 % (ref 5–12)
NEUTROPHILS NFR BLD AUTO: 12.98 10*3/MM3 (ref 1.7–7)
NEUTROPHILS NFR BLD AUTO: 92.6 % (ref 42.7–76)
NRBC BLD AUTO-RTO: 0 /100 WBC (ref 0–0.2)
PLATELET # BLD AUTO: 266 10*3/MM3 (ref 140–450)
PMV BLD AUTO: 10 FL (ref 6–12)
POTASSIUM SERPL-SCNC: 2.6 MMOL/L (ref 3.5–5.2)
PROT SERPL-MCNC: 4.1 G/DL (ref 6–8.5)
RBC # BLD AUTO: 4.02 10*6/MM3 (ref 4.14–5.8)
SODIUM SERPL-SCNC: 143 MMOL/L (ref 136–145)
WBC NRBC COR # BLD: 14.03 10*3/MM3 (ref 3.4–10.8)

## 2023-08-02 PROCEDURE — 76775 US EXAM ABDO BACK WALL LIM: CPT

## 2023-08-02 PROCEDURE — 93005 ELECTROCARDIOGRAM TRACING: CPT | Performed by: PHYSICIAN ASSISTANT

## 2023-08-02 PROCEDURE — 83735 ASSAY OF MAGNESIUM: CPT | Performed by: PHYSICIAN ASSISTANT

## 2023-08-02 PROCEDURE — 25010000002 ONDANSETRON PER 1 MG: Performed by: NURSE ANESTHETIST, CERTIFIED REGISTERED

## 2023-08-02 PROCEDURE — G0378 HOSPITAL OBSERVATION PER HR: HCPCS

## 2023-08-02 PROCEDURE — 25010000002 PROPOFOL 10 MG/ML EMULSION: Performed by: NURSE ANESTHETIST, CERTIFIED REGISTERED

## 2023-08-02 PROCEDURE — 25010000002 HYDROMORPHONE PER 4 MG: Performed by: UROLOGY

## 2023-08-02 PROCEDURE — 93010 ELECTROCARDIOGRAM REPORT: CPT | Performed by: INTERNAL MEDICINE

## 2023-08-02 PROCEDURE — 25010000002 CEFAZOLIN IN DEXTROSE 2-4 GM/100ML-% SOLUTION: Performed by: UROLOGY

## 2023-08-02 PROCEDURE — 25010000002 MIDAZOLAM PER 1 MG: Performed by: ANESTHESIOLOGY

## 2023-08-02 PROCEDURE — 25010000002 MAGNESIUM SULFATE 2 GM/50ML SOLUTION: Performed by: NURSE PRACTITIONER

## 2023-08-02 PROCEDURE — 88305 TISSUE EXAM BY PATHOLOGIST: CPT | Performed by: UROLOGY

## 2023-08-02 PROCEDURE — 25010000002 CALCIUM GLUCONATE 2-0.675 GM/100ML-% SOLUTION: Performed by: NURSE PRACTITIONER

## 2023-08-02 PROCEDURE — 82948 REAGENT STRIP/BLOOD GLUCOSE: CPT

## 2023-08-02 PROCEDURE — 25010000002 DEXAMETHASONE SODIUM PHOSPHATE 20 MG/5ML SOLUTION: Performed by: NURSE ANESTHETIST, CERTIFIED REGISTERED

## 2023-08-02 PROCEDURE — 25010000002 HYDROMORPHONE PER 4 MG: Performed by: NURSE ANESTHETIST, CERTIFIED REGISTERED

## 2023-08-02 PROCEDURE — 25010000002 FENTANYL CITRATE (PF) 50 MCG/ML SOLUTION: Performed by: NURSE ANESTHETIST, CERTIFIED REGISTERED

## 2023-08-02 PROCEDURE — 25010000002 CEFAZOLIN IN DEXTROSE 2000 MG/ 100 ML SOLUTION: Performed by: UROLOGY

## 2023-08-02 PROCEDURE — 80053 COMPREHEN METABOLIC PANEL: CPT | Performed by: PHYSICIAN ASSISTANT

## 2023-08-02 PROCEDURE — 25010000002 ONDANSETRON PER 1 MG: Performed by: UROLOGY

## 2023-08-02 PROCEDURE — 25010000002 KETOROLAC TROMETHAMINE PER 15 MG: Performed by: PHYSICIAN ASSISTANT

## 2023-08-02 PROCEDURE — 25010000002 CALCIUM GLUCONATE-NACL 1-0.675 GM/50ML-% SOLUTION: Performed by: NURSE PRACTITIONER

## 2023-08-02 PROCEDURE — 85025 COMPLETE CBC W/AUTO DIFF WBC: CPT | Performed by: PHYSICIAN ASSISTANT

## 2023-08-02 PROCEDURE — C2596 PROBE, ROBOTIC, WATER-JET: HCPCS | Performed by: UROLOGY

## 2023-08-02 PROCEDURE — 74176 CT ABD & PELVIS W/O CONTRAST: CPT

## 2023-08-02 PROCEDURE — 25010000002 SUGAMMADEX 200 MG/2ML SOLUTION: Performed by: NURSE ANESTHETIST, CERTIFIED REGISTERED

## 2023-08-02 RX ORDER — FAMOTIDINE 10 MG/ML
20 INJECTION, SOLUTION INTRAVENOUS ONCE
Status: COMPLETED | OUTPATIENT
Start: 2023-08-02 | End: 2023-08-02

## 2023-08-02 RX ORDER — HYDROCODONE BITARTRATE AND ACETAMINOPHEN 7.5; 325 MG/1; MG/1
1 TABLET ORAL EVERY 4 HOURS PRN
Status: DISCONTINUED | OUTPATIENT
Start: 2023-08-02 | End: 2023-08-02 | Stop reason: HOSPADM

## 2023-08-02 RX ORDER — POTASSIUM CHLORIDE 750 MG/1
40 TABLET, FILM COATED, EXTENDED RELEASE ORAL EVERY 4 HOURS
Status: COMPLETED | OUTPATIENT
Start: 2023-08-02 | End: 2023-08-03

## 2023-08-02 RX ORDER — SODIUM CHLORIDE 0.9 % (FLUSH) 0.9 %
3 SYRINGE (ML) INJECTION EVERY 12 HOURS SCHEDULED
Status: DISCONTINUED | OUTPATIENT
Start: 2023-08-02 | End: 2023-08-04 | Stop reason: HOSPADM

## 2023-08-02 RX ORDER — PANTOPRAZOLE SODIUM 40 MG/1
40 TABLET, DELAYED RELEASE ORAL
Status: DISCONTINUED | OUTPATIENT
Start: 2023-08-03 | End: 2023-08-04 | Stop reason: HOSPADM

## 2023-08-02 RX ORDER — DIPHENHYDRAMINE HYDROCHLORIDE 50 MG/ML
12.5 INJECTION INTRAMUSCULAR; INTRAVENOUS
Status: DISCONTINUED | OUTPATIENT
Start: 2023-08-02 | End: 2023-08-02 | Stop reason: HOSPADM

## 2023-08-02 RX ORDER — FLUMAZENIL 0.1 MG/ML
0.2 INJECTION INTRAVENOUS AS NEEDED
Status: DISCONTINUED | OUTPATIENT
Start: 2023-08-02 | End: 2023-08-02 | Stop reason: HOSPADM

## 2023-08-02 RX ORDER — AMOXICILLIN 250 MG
2 CAPSULE ORAL 2 TIMES DAILY
Status: DISCONTINUED | OUTPATIENT
Start: 2023-08-02 | End: 2023-08-04 | Stop reason: HOSPADM

## 2023-08-02 RX ORDER — MIDAZOLAM HYDROCHLORIDE 1 MG/ML
1 INJECTION INTRAMUSCULAR; INTRAVENOUS
Status: DISCONTINUED | OUTPATIENT
Start: 2023-08-02 | End: 2023-08-02 | Stop reason: HOSPADM

## 2023-08-02 RX ORDER — CEFAZOLIN SODIUM 2 G/100ML
2000 INJECTION, SOLUTION INTRAVENOUS ONCE
Status: COMPLETED | OUTPATIENT
Start: 2023-08-02 | End: 2023-08-02

## 2023-08-02 RX ORDER — MIDAZOLAM HYDROCHLORIDE 1 MG/ML
0.5 INJECTION INTRAMUSCULAR; INTRAVENOUS
Status: DISCONTINUED | OUTPATIENT
Start: 2023-08-02 | End: 2023-08-02 | Stop reason: HOSPADM

## 2023-08-02 RX ORDER — SODIUM CHLORIDE 0.9 % (FLUSH) 0.9 %
3-10 SYRINGE (ML) INJECTION AS NEEDED
Status: DISCONTINUED | OUTPATIENT
Start: 2023-08-02 | End: 2023-08-02 | Stop reason: HOSPADM

## 2023-08-02 RX ORDER — SODIUM CHLORIDE 0.9 % (FLUSH) 0.9 %
10 SYRINGE (ML) INJECTION AS NEEDED
Status: DISCONTINUED | OUTPATIENT
Start: 2023-08-02 | End: 2023-08-04 | Stop reason: HOSPADM

## 2023-08-02 RX ORDER — ROCURONIUM BROMIDE 10 MG/ML
INJECTION, SOLUTION INTRAVENOUS AS NEEDED
Status: DISCONTINUED | OUTPATIENT
Start: 2023-08-02 | End: 2023-08-02 | Stop reason: SURG

## 2023-08-02 RX ORDER — LIDOCAINE HYDROCHLORIDE 20 MG/ML
INJECTION, SOLUTION INFILTRATION; PERINEURAL AS NEEDED
Status: DISCONTINUED | OUTPATIENT
Start: 2023-08-02 | End: 2023-08-02 | Stop reason: SURG

## 2023-08-02 RX ORDER — DEXAMETHASONE SODIUM PHOSPHATE 4 MG/ML
INJECTION, SOLUTION INTRA-ARTICULAR; INTRALESIONAL; INTRAMUSCULAR; INTRAVENOUS; SOFT TISSUE AS NEEDED
Status: DISCONTINUED | OUTPATIENT
Start: 2023-08-02 | End: 2023-08-02 | Stop reason: SURG

## 2023-08-02 RX ORDER — KETOROLAC TROMETHAMINE 30 MG/ML
30 INJECTION, SOLUTION INTRAMUSCULAR; INTRAVENOUS ONCE
Status: COMPLETED | OUTPATIENT
Start: 2023-08-02 | End: 2023-08-02

## 2023-08-02 RX ORDER — CALCIUM GLUCONATE 20 MG/ML
2000 INJECTION, SOLUTION INTRAVENOUS
Status: COMPLETED | OUTPATIENT
Start: 2023-08-02 | End: 2023-08-03

## 2023-08-02 RX ORDER — ONDANSETRON 2 MG/ML
4 INJECTION INTRAMUSCULAR; INTRAVENOUS ONCE AS NEEDED
Status: COMPLETED | OUTPATIENT
Start: 2023-08-02 | End: 2023-08-02

## 2023-08-02 RX ORDER — NALOXONE HCL 0.4 MG/ML
0.1 VIAL (ML) INJECTION
Status: DISCONTINUED | OUTPATIENT
Start: 2023-08-02 | End: 2023-08-03

## 2023-08-02 RX ORDER — EPHEDRINE SULFATE 50 MG/ML
5 INJECTION, SOLUTION INTRAVENOUS ONCE AS NEEDED
Status: DISCONTINUED | OUTPATIENT
Start: 2023-08-02 | End: 2023-08-02 | Stop reason: HOSPADM

## 2023-08-02 RX ORDER — SODIUM CHLORIDE, SODIUM LACTATE, POTASSIUM CHLORIDE, CALCIUM CHLORIDE 600; 310; 30; 20 MG/100ML; MG/100ML; MG/100ML; MG/100ML
9 INJECTION, SOLUTION INTRAVENOUS CONTINUOUS
Status: DISCONTINUED | OUTPATIENT
Start: 2023-08-02 | End: 2023-08-02

## 2023-08-02 RX ORDER — FENTANYL CITRATE 50 UG/ML
INJECTION, SOLUTION INTRAMUSCULAR; INTRAVENOUS AS NEEDED
Status: DISCONTINUED | OUTPATIENT
Start: 2023-08-02 | End: 2023-08-02 | Stop reason: SURG

## 2023-08-02 RX ORDER — ONDANSETRON 2 MG/ML
INJECTION INTRAMUSCULAR; INTRAVENOUS AS NEEDED
Status: DISCONTINUED | OUTPATIENT
Start: 2023-08-02 | End: 2023-08-02 | Stop reason: SURG

## 2023-08-02 RX ORDER — EPHEDRINE SULFATE 50 MG/ML
INJECTION INTRAVENOUS AS NEEDED
Status: DISCONTINUED | OUTPATIENT
Start: 2023-08-02 | End: 2023-08-02 | Stop reason: SURG

## 2023-08-02 RX ORDER — NITROGLYCERIN 0.4 MG/1
0.4 TABLET SUBLINGUAL
Status: DISCONTINUED | OUTPATIENT
Start: 2023-08-02 | End: 2023-08-04 | Stop reason: HOSPADM

## 2023-08-02 RX ORDER — PROMETHAZINE HYDROCHLORIDE 25 MG/1
25 TABLET ORAL ONCE AS NEEDED
Status: DISCONTINUED | OUTPATIENT
Start: 2023-08-02 | End: 2023-08-02 | Stop reason: HOSPADM

## 2023-08-02 RX ORDER — SIMETHICONE 80 MG
80 TABLET,CHEWABLE ORAL 4 TIMES DAILY PRN
Status: DISCONTINUED | OUTPATIENT
Start: 2023-08-02 | End: 2023-08-04 | Stop reason: HOSPADM

## 2023-08-02 RX ORDER — ONDANSETRON 4 MG/1
4 TABLET, FILM COATED ORAL EVERY 6 HOURS PRN
Status: DISCONTINUED | OUTPATIENT
Start: 2023-08-02 | End: 2023-08-04 | Stop reason: HOSPADM

## 2023-08-02 RX ORDER — PROMETHAZINE HYDROCHLORIDE 25 MG/1
25 SUPPOSITORY RECTAL ONCE AS NEEDED
Status: DISCONTINUED | OUTPATIENT
Start: 2023-08-02 | End: 2023-08-02 | Stop reason: HOSPADM

## 2023-08-02 RX ORDER — HYDRALAZINE HYDROCHLORIDE 20 MG/ML
5 INJECTION INTRAMUSCULAR; INTRAVENOUS
Status: DISCONTINUED | OUTPATIENT
Start: 2023-08-02 | End: 2023-08-02 | Stop reason: HOSPADM

## 2023-08-02 RX ORDER — LABETALOL HYDROCHLORIDE 5 MG/ML
5 INJECTION, SOLUTION INTRAVENOUS
Status: DISCONTINUED | OUTPATIENT
Start: 2023-08-02 | End: 2023-08-02 | Stop reason: HOSPADM

## 2023-08-02 RX ORDER — CEFAZOLIN SODIUM 2 G/100ML
2000 INJECTION, SOLUTION INTRAVENOUS EVERY 8 HOURS
Status: COMPLETED | OUTPATIENT
Start: 2023-08-02 | End: 2023-08-04

## 2023-08-02 RX ORDER — MAGNESIUM HYDROXIDE 1200 MG/15ML
LIQUID ORAL AS NEEDED
Status: DISCONTINUED | OUTPATIENT
Start: 2023-08-02 | End: 2023-08-02 | Stop reason: HOSPADM

## 2023-08-02 RX ORDER — HYDROMORPHONE HYDROCHLORIDE 1 MG/ML
0.25 INJECTION, SOLUTION INTRAMUSCULAR; INTRAVENOUS; SUBCUTANEOUS
Status: DISCONTINUED | OUTPATIENT
Start: 2023-08-02 | End: 2023-08-02 | Stop reason: HOSPADM

## 2023-08-02 RX ORDER — SODIUM CHLORIDE 0.9 % (FLUSH) 0.9 %
3 SYRINGE (ML) INJECTION EVERY 12 HOURS SCHEDULED
Status: DISCONTINUED | OUTPATIENT
Start: 2023-08-02 | End: 2023-08-02 | Stop reason: HOSPADM

## 2023-08-02 RX ORDER — HYDROCODONE BITARTRATE AND ACETAMINOPHEN 5; 325 MG/1; MG/1
1 TABLET ORAL ONCE AS NEEDED
Status: DISCONTINUED | OUTPATIENT
Start: 2023-08-02 | End: 2023-08-02 | Stop reason: HOSPADM

## 2023-08-02 RX ORDER — FENTANYL CITRATE 50 UG/ML
25 INJECTION, SOLUTION INTRAMUSCULAR; INTRAVENOUS
Status: DISCONTINUED | OUTPATIENT
Start: 2023-08-02 | End: 2023-08-02 | Stop reason: HOSPADM

## 2023-08-02 RX ORDER — DROPERIDOL 2.5 MG/ML
0.62 INJECTION, SOLUTION INTRAMUSCULAR; INTRAVENOUS
Status: DISCONTINUED | OUTPATIENT
Start: 2023-08-02 | End: 2023-08-02 | Stop reason: HOSPADM

## 2023-08-02 RX ORDER — MAGNESIUM SULFATE HEPTAHYDRATE 40 MG/ML
2 INJECTION, SOLUTION INTRAVENOUS
Status: COMPLETED | OUTPATIENT
Start: 2023-08-02 | End: 2023-08-03

## 2023-08-02 RX ORDER — SODIUM CHLORIDE 9 MG/ML
40 INJECTION, SOLUTION INTRAVENOUS AS NEEDED
Status: DISCONTINUED | OUTPATIENT
Start: 2023-08-02 | End: 2023-08-04 | Stop reason: HOSPADM

## 2023-08-02 RX ORDER — NALOXONE HCL 0.4 MG/ML
0.2 VIAL (ML) INJECTION AS NEEDED
Status: DISCONTINUED | OUTPATIENT
Start: 2023-08-02 | End: 2023-08-02 | Stop reason: HOSPADM

## 2023-08-02 RX ORDER — IPRATROPIUM BROMIDE AND ALBUTEROL SULFATE 2.5; .5 MG/3ML; MG/3ML
3 SOLUTION RESPIRATORY (INHALATION) ONCE AS NEEDED
Status: DISCONTINUED | OUTPATIENT
Start: 2023-08-02 | End: 2023-08-02 | Stop reason: HOSPADM

## 2023-08-02 RX ORDER — CALCIUM GLUCONATE 20 MG/ML
1000 INJECTION, SOLUTION INTRAVENOUS
Status: COMPLETED | OUTPATIENT
Start: 2023-08-02 | End: 2023-08-02

## 2023-08-02 RX ORDER — OXYCODONE AND ACETAMINOPHEN 7.5; 325 MG/1; MG/1
1 TABLET ORAL EVERY 4 HOURS PRN
Status: DISCONTINUED | OUTPATIENT
Start: 2023-08-02 | End: 2023-08-04 | Stop reason: HOSPADM

## 2023-08-02 RX ORDER — PROPOFOL 10 MG/ML
VIAL (ML) INTRAVENOUS AS NEEDED
Status: DISCONTINUED | OUTPATIENT
Start: 2023-08-02 | End: 2023-08-02 | Stop reason: SURG

## 2023-08-02 RX ORDER — FENTANYL CITRATE 50 UG/ML
50 INJECTION, SOLUTION INTRAMUSCULAR; INTRAVENOUS ONCE AS NEEDED
Status: DISCONTINUED | OUTPATIENT
Start: 2023-08-02 | End: 2023-08-02 | Stop reason: HOSPADM

## 2023-08-02 RX ORDER — HYDROMORPHONE HYDROCHLORIDE 1 MG/ML
0.5 INJECTION, SOLUTION INTRAMUSCULAR; INTRAVENOUS; SUBCUTANEOUS
Status: DISCONTINUED | OUTPATIENT
Start: 2023-08-02 | End: 2023-08-03

## 2023-08-02 RX ORDER — ONDANSETRON 2 MG/ML
4 INJECTION INTRAMUSCULAR; INTRAVENOUS EVERY 6 HOURS PRN
Status: DISCONTINUED | OUTPATIENT
Start: 2023-08-02 | End: 2023-08-04 | Stop reason: HOSPADM

## 2023-08-02 RX ORDER — SODIUM CHLORIDE 9 MG/ML
100 INJECTION, SOLUTION INTRAVENOUS CONTINUOUS
Status: DISCONTINUED | OUTPATIENT
Start: 2023-08-02 | End: 2023-08-04 | Stop reason: HOSPADM

## 2023-08-02 RX ADMIN — MIDAZOLAM 1 MG: 1 INJECTION INTRAMUSCULAR; INTRAVENOUS at 07:55

## 2023-08-02 RX ADMIN — PROPOFOL 150 MG: 10 INJECTION, EMULSION INTRAVENOUS at 09:38

## 2023-08-02 RX ADMIN — MAGNESIUM SULFATE HEPTAHYDRATE 2 G: 2 INJECTION, SOLUTION INTRAVENOUS at 23:05

## 2023-08-02 RX ADMIN — LIDOCAINE HYDROCHLORIDE 125 MG: 10 INJECTION, SOLUTION INFILTRATION; PERINEURAL at 23:35

## 2023-08-02 RX ADMIN — HYDROMORPHONE HYDROCHLORIDE 0.25 MG: 1 INJECTION, SOLUTION INTRAMUSCULAR; INTRAVENOUS; SUBCUTANEOUS at 11:13

## 2023-08-02 RX ADMIN — OXYCODONE AND ACETAMINOPHEN 1 TABLET: 7.5; 325 TABLET ORAL at 19:41

## 2023-08-02 RX ADMIN — LIDOCAINE HYDROCHLORIDE 100 MG: 20 INJECTION, SOLUTION INFILTRATION; PERINEURAL at 09:38

## 2023-08-02 RX ADMIN — HYDROMORPHONE HYDROCHLORIDE 0.25 MG: 1 INJECTION, SOLUTION INTRAMUSCULAR; INTRAVENOUS; SUBCUTANEOUS at 10:57

## 2023-08-02 RX ADMIN — CEFAZOLIN SODIUM 2000 MG: 2 INJECTION, SOLUTION INTRAVENOUS at 17:56

## 2023-08-02 RX ADMIN — HYDROMORPHONE HYDROCHLORIDE 0.25 MG: 1 INJECTION, SOLUTION INTRAMUSCULAR; INTRAVENOUS; SUBCUTANEOUS at 11:25

## 2023-08-02 RX ADMIN — HYDROMORPHONE HYDROCHLORIDE 0.25 MG: 1 INJECTION, SOLUTION INTRAMUSCULAR; INTRAVENOUS; SUBCUTANEOUS at 11:47

## 2023-08-02 RX ADMIN — FENTANYL CITRATE 50 MCG: 50 INJECTION, SOLUTION INTRAMUSCULAR; INTRAVENOUS at 09:35

## 2023-08-02 RX ADMIN — CALCIUM GLUCONATE 2000 MG: 20 INJECTION, SOLUTION INTRAVENOUS at 22:12

## 2023-08-02 RX ADMIN — Medication 3 ML: at 20:00

## 2023-08-02 RX ADMIN — FAMOTIDINE 20 MG: 10 INJECTION, SOLUTION INTRAVENOUS at 07:55

## 2023-08-02 RX ADMIN — CALCIUM GLUCONATE 1000 MG: 20 INJECTION, SOLUTION INTRAVENOUS at 19:55

## 2023-08-02 RX ADMIN — ROCURONIUM BROMIDE 50 MG: 10 INJECTION, SOLUTION INTRAVENOUS at 09:39

## 2023-08-02 RX ADMIN — SUGAMMADEX 200 MG: 100 INJECTION, SOLUTION INTRAVENOUS at 10:29

## 2023-08-02 RX ADMIN — SENNOSIDES AND DOCUSATE SODIUM 2 TABLET: 50; 8.6 TABLET ORAL at 20:00

## 2023-08-02 RX ADMIN — ONDANSETRON 4 MG: 2 INJECTION INTRAMUSCULAR; INTRAVENOUS at 10:29

## 2023-08-02 RX ADMIN — POTASSIUM CHLORIDE 40 MEQ: 750 TABLET, EXTENDED RELEASE ORAL at 19:52

## 2023-08-02 RX ADMIN — DEXAMETHASONE SODIUM PHOSPHATE 8 MG: 4 INJECTION, SOLUTION INTRAMUSCULAR; INTRAVENOUS at 09:53

## 2023-08-02 RX ADMIN — EPHEDRINE SULFATE 10 MG: 50 INJECTION INTRAVENOUS at 10:23

## 2023-08-02 RX ADMIN — CEFAZOLIN SODIUM 2000 MG: 2 INJECTION, SOLUTION INTRAVENOUS at 09:29

## 2023-08-02 RX ADMIN — SODIUM CHLORIDE 100 ML/HR: 9 INJECTION, SOLUTION INTRAVENOUS at 11:54

## 2023-08-02 RX ADMIN — SODIUM CHLORIDE, POTASSIUM CHLORIDE, SODIUM LACTATE AND CALCIUM CHLORIDE 9 ML/HR: 600; 310; 30; 20 INJECTION, SOLUTION INTRAVENOUS at 07:55

## 2023-08-02 RX ADMIN — HYDROMORPHONE HYDROCHLORIDE 0.5 MG: 1 INJECTION, SOLUTION INTRAMUSCULAR; INTRAVENOUS; SUBCUTANEOUS at 18:33

## 2023-08-02 RX ADMIN — ONDANSETRON 4 MG: 2 INJECTION INTRAMUSCULAR; INTRAVENOUS at 21:15

## 2023-08-02 RX ADMIN — KETOROLAC TROMETHAMINE 30 MG: 30 INJECTION, SOLUTION INTRAMUSCULAR at 19:10

## 2023-08-02 RX ADMIN — ONDANSETRON 4 MG: 2 INJECTION INTRAMUSCULAR; INTRAVENOUS at 11:13

## 2023-08-02 RX ADMIN — MAGNESIUM SULFATE HEPTAHYDRATE 2 G: 2 INJECTION, SOLUTION INTRAVENOUS at 20:47

## 2023-08-02 NOTE — H&P
[facility] Surgical History and Physical    Patient Care Team:  Nasra Burton APRN as PCP - General (Nurse Practitioner)    Chief complaint nocturia    Subjective     Patient is a 63 y.o. male presents with BPH and worsening LUTS. Nocturia x 4 and increasing frequency. Urocuff with flow 6ml/sec and peak pressure at 07coA0S. Prostate MRi volume 99cm3 with no discernable lesions. PSA 5.90. No biopsy. Remains on flomax 0.8mg and at one point ws on oxybutynin which we have stopped. PVR 417ml.     Review of Systems   Pertinent items are noted in HPI    Past Medical History:   Diagnosis Date    BPH (benign prostatic hyperplasia)     Chronic lumbar pain     Elevated PSA 9/1/2022    Frequent urination     URGENCY    GERD (gastroesophageal reflux disease)     History of kidney stones     Hyperlipidemia     Insomnia     Left inguinal hernia      Past Surgical History:   Procedure Laterality Date    COLONOSCOPY  07/20/2017    [facility]- Repeat in 10 years.     EPIDURAL BLOCK      FRACTURE SURGERY  1970    Broke shin    LITHOTRIPSY      UMBILICAL HERNIA REPAIR      VASECTOMY       Family History   Problem Relation Age of Onset    Cancer Mother     Cancer Father     Breast cancer Other     Stroke Other     Malig Hyperthermia Neg Hx      Social History     Tobacco Use    Smoking status: Never    Smokeless tobacco: Never   Vaping Use    Vaping Use: Never used   Substance Use Topics    Alcohol use: Yes     Alcohol/week: 7.0 standard drinks     Types: 7 Shots of liquor per week     Comment: DAILY:  VODKA 1-2 NIGHTLY    Drug use: Never       Meds:  Medications Prior to Admission   Medication Sig Dispense Refill Last Dose    atorvastatin (Lipitor) 80 MG tablet Take 1 tablet by mouth Daily. 90 tablet 1 8/1/2023 at 0800    omeprazole (priLOSEC) 20 MG capsule Take 1 capsule by mouth Daily. 90 capsule 1 8/2/2023 at 0430    tamsulosin (FLOMAX) 0.4 MG capsule 24 hr capsule Take 1 capsule by mouth Daily.  (Patient taking differently: Take 1 capsule by mouth 2 (Two) Times a Day.) 90 capsule 1 8/1/2023 at 0800    traZODone (DESYREL) 50 MG tablet Take 1 tablet by mouth Every Night. 90 tablet 1 8/1/2023 at 2100    celecoxib (CeleBREX) 200 MG capsule Take 1 capsule by mouth Daily. (Patient taking differently: Take 1 capsule by mouth Daily. PT STATES WAS NEVER INSTRUCTED TO HOLD THIS PER DR. JURADO) 90 capsule 1 7/27/2023       Allergies:  Patient has no known allergies.    Debilities:  none    Objective     Vital Signs     No intake or output data in the 24 hours ending 08/02/23 0738       Physical Exam:     General Appearance:    Alert, cooperative, NAD   HEENT:    No trauma, pupils reactive, hearing intact   Back:     No CVA tenderness   Lungs:     Respirations unlabored, no wheezing    Heart:    RRR, intact peripheral pulses   Abdomen:     Soft, NDNT, no masses, no guarding   :    Phallus nl   Extremities:   No edema, no deformity   Lymphatic:   No neck or groin LAD   Skin:   No bleeding, bruising or rashes   Neuro/Psych:   Orientation intact, mood/affect pleasant, no focal findings     Results Review:    I reviewed the patient's new clinical results.  Results for orders placed or performed in visit on 07/19/23   Basic Metabolic Panel    Specimen: Blood   Result Value Ref Range    Glucose 110 (H) 65 - 99 mg/dL    BUN 18 8 - 23 mg/dL    Creatinine 0.94 0.76 - 1.27 mg/dL    Sodium 140 136 - 145 mmol/L    Potassium 4.3 3.5 - 5.2 mmol/L    Chloride 106 98 - 107 mmol/L    CO2 25.5 22.0 - 29.0 mmol/L    Calcium 9.6 8.6 - 10.5 mg/dL    BUN/Creatinine Ratio 19.1 7.0 - 25.0    Anion Gap 8.5 5.0 - 15.0 mmol/L    eGFR 90.0 >60.0 mL/min/1.73   CBC (No Diff)    Specimen: Blood   Result Value Ref Range    WBC 7.34 3.40 - 10.80 10*3/mm3    RBC 4.56 4.14 - 5.80 10*6/mm3    Hemoglobin 14.4 13.0 - 17.7 g/dL    Hematocrit 43.1 37.5 - 51.0 %    MCV 94.5 79.0 - 97.0 fL    MCH 31.6 26.6 - 33.0 pg    MCHC 33.4 31.5 - 35.7 g/dL    RDW 12.8  12.3 - 15.4 %    RDW-SD 43.8 37.0 - 54.0 fl    MPV 10.0 6.0 - 12.0 fL    Platelets 264 140 - 450 10*3/mm3   ECG 12 Lead   Result Value Ref Range    QT Interval 451 ms        Assessment:  BPH with Bladder Outlet Obstruction . 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    Plan:    Aquablation of the Prostate    I discussed the patient's findings and my recommendations with patient.   Risks, complications, outcomes and alternatives discussed with the patient at the bedside and office.    Nikita Garrett MD  08/02/23  07:38 EDT

## 2023-08-02 NOTE — H&P
[facility]   HISTORY AND PHYSICAL    Patient Name: Andrew Miranda  : 1960  MRN: [id number]  Primary Care Physician:  Nasra Burton APRN  Date of admission: 2023    Subjective   Subjective     Chief Complaint: No chief complaint on file.        HPI:    Andrew Miranda is a 63 y.o. male with history of GERD and BPH with lower urinary tract symptoms, who is being admitted to the observation unit today after aquablation of prostate with urology in the OR.    Patient tolerated procedure without complications. Patient is being admitted to observation unit for continuous bladder irrigation.  Posoperatively patient reports minimal discomfort.  He will receive antibiotics per urology. Patient denies chest pain, shortness of breath, palpitations, nausea, vomiting, diarrhea, fever, chills.      Review of Systems   All systems were reviewed and negative except for: What is discussed in the HPI    Personal History     Past Medical History:   Diagnosis Date    BPH (benign prostatic hyperplasia)     BPH with obstruction/lower urinary tract symptoms 2023    Chronic lumbar pain     Elevated PSA 2022    Frequent urination     URGENCY    GERD (gastroesophageal reflux disease)     History of kidney stones     Hyperlipidemia     Insomnia     Left inguinal hernia        Past Surgical History:   Procedure Laterality Date    COLONOSCOPY  2017    [facility]- Repeat in 10 years.     EPIDURAL BLOCK      FRACTURE SURGERY      Broke shin    LITHOTRIPSY      UMBILICAL HERNIA REPAIR      VASECTOMY         Family History: family history includes Breast cancer in an other family member; Cancer in his father and mother; Stroke in an other family member. Otherwise pertinent FHx was reviewed and not pertinent to current issue.    Social History:  reports that he has never smoked. He has never used smokeless tobacco. He reports current alcohol use of about 7.0 standard drinks per week. He  reports that he does not use drugs.    Home Medications:  atorvastatin, celecoxib, omeprazole, and traZODone    Allergies:  No Known Allergies    Objective   Objective     Vitals:   Temp:  [97.4 øF (36.3 øC)-97.5 øF (36.4 øC)] 97.5 øF (36.4 øC)  Heart Rate:  [54-61] 60  Resp:  [16] 16  BP: (109-126)/(69-84) 113/69  Flow (L/min):  [2-4] 2  Physical Exam     GENERAL: 63 y.o. YO male a/o x 4, NAD  SKIN: Warm pink and dry   HEENT:  PERRL, EOM intact, conjunctivae normal, sclerae clear  NECK: supple, no JVD  LUNGS: Clear to auscultation bilaterally without wheezes, rales or rhonchi.  No accessory muscle use and no nasal flaring.  CARDIAC:  Regular rate and rhythm, S1-S2.  No murmurs, rubs or gallops.  No peripheral edema.  Equal pulses bilaterally.  ABDOMEN: Soft, nontender, nondistended.  No guarding or rebound tenderness.  Normal bowel sounds.  : Three-way Kuhn catheter with continuous bladder irrigation, hematuria  MUSCULOSKELETAL: Moves all extremities well.  No deformity.  NEURO: Cranial nerves II through XII grossly intact.  No gross focal deficits.  Alert.  Normal speech and motor.  PSYCH: Normal mood and affect      Result Review    Result Review:  I have personally reviewed the results from the time of this admission to 8/2/2023 14:51 EDT and agree with these findings:  []  Laboratory list / accordion  []  Microbiology  []  Radiology  []  EKG/Telemetry   []  Cardiology/Vascular   []  Pathology  []  Old records  []  Other:  Most notable findings include: Admission labs pending      Assessment & Plan   Assessment / Plan     Brief Patient Summary:  Andrew Miarnda is a 63 y.o. male who is being admitted to observation unit status post aquablation of prostate for continuous bladder irrigation and observation.    Active Hospital Problems:  Active Hospital Problems    Diagnosis     **BPH with obstruction/lower urinary tract symptoms      Plan:     BPH with obstruction/lower urinary tract symptoms  -Status post  aquablation with Dr. Gordo Garrett  -Continuous bladder irrigation per urology  -Antibiotics per urology  -Pain control as needed    GERD  -Continue PPI      DVT prophylaxis:  Mechanical DVT prophylaxis orders are present.    CODE STATUS:       Admission Status:  I believe this patient meets observation status.     75 minutes has been spent by [facility] providers in the care of this patient while under observation status    I wore an appropriate PPE during this patient encounter.  Hand hygeine performed before and after seeing the patient.    Electronically signed by DANE Sommers, 08/02/23, 2:51 PM EDT.

## 2023-08-02 NOTE — CASE MANAGEMENT/SOCIAL WORK
Discharge Planning Assessment  [facility]     Patient Name: Andrew Miranda  MRN: [id number]  Today's Date: 8/2/2023    Admit Date: 8/2/2023    Plan: Plans to return home at d/cJANAE Galaviz RN   Discharge Needs Assessment       Row Name 08/02/23 1452       Living Environment    People in Home spouse    Name(s) of People in Home Melissa    Current Living Arrangements home    Potentially Unsafe Housing Conditions none    Primary Care Provided by self    Provides Primary Care For no one    Family Caregiver if Needed spouse    Family Caregiver Names Melissa    Quality of Family Relationships supportive    Able to Return to Prior Arrangements yes       Resource/Environmental Concerns    Resource/Environmental Concerns none       Transition Planning    Patient/Family Anticipates Transition to home with family    Patient/Family Anticipated Services at Transition none    Transportation Anticipated family or friend will provide       Discharge Needs Assessment    Equipment Currently Used at Home none    Concerns to be Addressed no discharge needs identified    Anticipated Changes Related to Illness none    Equipment Needed After Discharge none    Provided Post Acute Provider List? N/A    Provided Post Acute Provider Quality & Resource List? N/A                   Discharge Plan       Row Name 08/02/23 8552       Plan    Plan Plans to return home at d/cJANAE Galaviz RN    Patient/Family in Agreement with Plan yes    Provided Post Acute Provider List? N/A    Provided Post Acute Provider Quality & Resource List? N/A    Plan Comments Spoke w/ patient at bedside w/ his permission. Introduced self and explained role. All info on facesheet, including PCP as TRAN Burton, verified. Lives in three story home w/ wife Melissa and is able to navigate steps and around home w/o difficulty. Independent w/ ADLs. No assistive devices used. denies need for any DME or community resources at d/c.  Uses [facility] and is able to obtain  and pay for meds. To return home at d/c, w/ wife's assist as needed; family will transport; agreeable w/ plan. CM will continue to follow-JEAN Galaviz RN                  Continued Care and Services - Admitted Since 8/2/2023    Coordination has not been started for this encounter.          Demographic Summary       Row Name 08/02/23 1450       General Information    Admission Type observation    Arrived From PACU/recovery room    Referral Source admission list    Reason for Consult discharge planning    Preferred Language English       Contact Information    Permission Granted to Share Info With                    Functional Status       Row Name 08/02/23 1451       Functional Status    Usual Activity Tolerance good    Current Activity Tolerance good       Functional Status, IADL    Medications independent    IADL Comments wife does most meal prep, housekeeping, laundry and shopping       Mental Status    General Appearance WDL WDL       Mental Status Summary    Recent Changes in Mental Status/Cognitive Functioning no changes                   Psychosocial       Row Name 08/02/23 1451       Behavior WDL    Behavior WDL WDL       Emotion Mood WDL    Emotion/Mood/Affect WDL WDL       Speech WDL    Speech WDL WDL       Thought Process WDL    Thought Process WDL WDL       Intellectual Performance WDL    Intellectual Performance WDL WDL    Level of Consciousness Alert                   Abuse/Neglect    No documentation.                  Legal    No documentation.                  Substance Abuse    No documentation.                  Patient Forms    No documentation.                     Jennie Galaviz, CHARLIE

## 2023-08-02 NOTE — PLAN OF CARE
Goal Outcome Evaluation:              Outcome Evaluation: pt was admitted to the obs unit for aquablation, pt didnt complain of pressure when he initally arrived to the unit, at 1800 the pt started to compain of pressure on the left side, pt was irrigated x1 and clots were removed, pt still complained of pain and pressure and was irrigated again and no clots were explled, pt started to get pale and sweaty, bp was 95/60, provider was made aware and 1L bolus was ordered, pt was still complaining of pain and stated pain felt like when he had kidney stones, pt has taken for a stat CT scan, pt was given pain medication x2  with no relief, pt still on CBI ouput is light pink/ red in color.

## 2023-08-02 NOTE — OP NOTE
TRANSURETHRAL PROSTATE AQUABLATION  Procedure Note    Andrew Miranda  8/2/2023    Pre-op Diagnosis:   BPH with Bladder Outlet Obstruction    Post-op Diagnosis:     Post-Op Diagnosis Codes:     * BPH with obstruction/lower urinary tract symptoms [N40.1, N13.8]    Procedure(s):  AQUABLATION OF THE PROSTATE    Surgeon(s):  Nikita Garrett MD    Anesthesia: General    Staff:   Circulator: Lucy Dodson RN  Scrub Person: Bladimir Zaragoza; Pura Davis    Estimated Blood Loss: 200ml    Specimens:                Order Name Source Comment Collection Info Order Time   TISSUE PATHOLOGY EXAM Prostate  Collected By: Nikita Garrett MD 8/2/2023 10:31 AM     Release to patient   Routine Release              Drains:  A Valerie 24 Tajik 3-way hematuria catheter to continuous bladder irrigation with 30mL of sterile water in the Kuhn catheter balloon    Findings: large lateral lobe hyperplasia    Complications: none apparent    Procedure:   Transurethral waterjet ablation of the prostate including  1. Cystoscopy  2. Transrectal ultrasound of the prostate with volume assessment & Aquablation treatment planning  3. Procept Aquabeam Aquablation of the prostate  4. Evacuation of clots  5. Placement of Kuhn catheter            INDICATIONS FOR PROCEDURE:  This is a 63 year-old man with known BPH and a volumetric 99mL prostate.  He has tried BPH medications in the past or is currently on medications.  AUA IPSS is 19.  Uroflow/Urocuff studies demonstrated a Qmax of 4 mL/s, a cuff interruption pressure of 60cm H2O, and a postvoid residual volume of 420mL.   Flexible cystoscopy in the office confirmed the presence of obstruction with prostatic hypertrophy.  Median lobe was not present. The ureteral orifices were normal in appearance.  The bladder was capacious and trabeculated.  Diverticula were present.  PSA was 5.90ng/dL.  no prior prostate biopsy.  Digital rectal examination demonstrated no nodules or  abnormalities.    With regard to the patient's significant BPH, we talked about possible treatment options including GreenLight surgery, transurethral resection of the prostate, and robotic simple prostatectomy.  In the setting of significant prostatic enlargement and worsening lower urinary tract symptoms, I have recommended Aquablation of the prostate to improve urinary flow and emptying.  I made it clear that this is a newly available procedure for BPH.  It is an autonomous robotic procedure that utilizes high pressure waterjet technology to remove obstructive prostate tissue with precision utilizing real-time ultrasound technology.  This allows fast, efficient, and standardized removal of a large amount of obstructive prostatic tissue without the associated side effects of similar BPH procedures that utilize thermal energy.    We discussed the fact that gross hematuria after surgery is very common and requires transient continuous bladder irrigation after the procedure.  We also discussed the very low risk of urinary incontinence and retrograde ejaculation, potential regrowth of obstructive tissue, persistent lower urinary tract symptoms for 1-2 weeks, bladder injury, bladder neck contracture, urethral stricture, anterior capsular perforation of the prostate, and transient urinary retention as possible problems after surgery.  I made it clear that some bleeding and the need for Kuhn catheterization for 1-4 days are the most common issues after surgery.   Given the patient's bothersome symptoms, the patient has agreed to move forward with surgery.        PROCEDURE IN DETAIL:  After consent was verified, preoperative antibiotic prophylaxis was given, and a time out was performed, the patient was placed supine on the operating room table.  Once adequate general endotracheal anesthesia was given, the patient was positioned in lithotomy and prepped and draped in standard surgical fashion for cystoscopy with  Aquablation of the prostate using chlorhexidine scrub. Formal surgical time out performed.    A transrectal ultrasound probe was then inserted into the rectum. The prostate was aligned with the probe on the stepper.     Treatment planning was then performed for planned Aquablation of the prostate.  The Aquabeam handpiece was placed into the bladder via the urethra and aligned longitudinally with the ultrasound probe.  The handpiece with its flexible camera was used to visually inspect the entire length of the urethra from the meatus to the bladder.  The bladder neck, verumontanum, and external sphincter were located and marked on the ultrasound.  The distance from the bladder neck to the external sphincter was calculated.  The water jet was tested in both the transverse and sagittal planes on the ultrasound and was properly aligned.  Once this was done, the amount of prostate tissue for removal was determined.  The sweep angle in the transverse plane was calculated.  During the course of treatment planning, care was taken to avoid the neurovascular bundles, rectum, verumontanum, and external sphincter.  The apex-veru protection zone was identified and established on the treatment plan.    At this point, Aquablation was performed.  Total ablation time was 3 minutes and 50 seconds.  A total of 2 pass(es) was/were required to remove all tissue.  Aquablation was monitored visually in real time through the cystoscope in the handpiece.  A wide channel was noted at the end of the Aquablation cycle.  The verumontanum and apical tissue was entirely spared to minimize the risk of retrograde ejaculation. The external sphincter was also left intact.  There was mild to moderate bleeding noted that cleared with irrigation through the cystoscope.  The handpiece was then removed and a 26 Yi resectoscope with a visual obturator was inserted into the bladder.  A gala syringe was then used to flush clots and some tissue out from  the bladder.  The post-Aquablation channel was widely patent and easily visualized with flushing on the transrectal ultrasound.  The transrectal probe was removed from the anus with removal of the stepper. The control arms were removed.  A 24 German Valerie 3-way hematuria catheter with the tip cut off was passed into the bladder and 30mL of sterile water was injected into the Kuhn catheter balloon.  Normal saline irrigation was connected to the third port.  The catheter was connected to a urinary drainage bag.  In the recovery rooom belladonna and opium suppository was placed into the rectum to minimize bladder spasms.  The patient tolerated the procedure well.  He was awoken from anesthesia and transferred to the PACU in stable condition.  He will be kept in the hospital on observation status until the urine clears and continuous bladder irrigation is turned off.      DISPOSITION: To PACU, stable and extubated      Nikita Garrett MD

## 2023-08-03 LAB
ANION GAP SERPL CALCULATED.3IONS-SCNC: 9.7 MMOL/L (ref 5–15)
BUN SERPL-MCNC: 23 MG/DL (ref 8–23)
BUN/CREAT SERPL: 12.9 (ref 7–25)
CALCIUM SPEC-SCNC: 9.5 MG/DL (ref 8.6–10.5)
CHLORIDE SERPL-SCNC: 109 MMOL/L (ref 98–107)
CO2 SERPL-SCNC: 19.3 MMOL/L (ref 22–29)
CREAT SERPL-MCNC: 1.78 MG/DL (ref 0.76–1.27)
DEPRECATED RDW RBC AUTO: 42.4 FL (ref 37–54)
EGFRCR SERPLBLD CKD-EPI 2021: 41.8 ML/MIN/1.73
ERYTHROCYTE [DISTWIDTH] IN BLOOD BY AUTOMATED COUNT: 12.6 % (ref 12.3–15.4)
GLUCOSE SERPL-MCNC: 165 MG/DL (ref 65–99)
HCT VFR BLD AUTO: 30.1 % (ref 37.5–51)
HGB BLD-MCNC: 10.4 G/DL (ref 13–17.7)
LAB AP CASE REPORT: NORMAL
MAGNESIUM SERPL-MCNC: 3 MG/DL (ref 1.6–2.4)
MCH RBC QN AUTO: 32 PG (ref 26.6–33)
MCHC RBC AUTO-ENTMCNC: 34.6 G/DL (ref 31.5–35.7)
MCV RBC AUTO: 92.6 FL (ref 79–97)
PATH REPORT.FINAL DX SPEC: NORMAL
PATH REPORT.GROSS SPEC: NORMAL
PLATELET # BLD AUTO: 195 10*3/MM3 (ref 140–450)
PMV BLD AUTO: 10.3 FL (ref 6–12)
POTASSIUM SERPL-SCNC: 5.1 MMOL/L (ref 3.5–5.2)
QT INTERVAL: 418 MS
RBC # BLD AUTO: 3.25 10*6/MM3 (ref 4.14–5.8)
SODIUM SERPL-SCNC: 138 MMOL/L (ref 136–145)
WBC NRBC COR # BLD: 13.72 10*3/MM3 (ref 3.4–10.8)

## 2023-08-03 PROCEDURE — 80048 BASIC METABOLIC PNL TOTAL CA: CPT | Performed by: UROLOGY

## 2023-08-03 PROCEDURE — 25010000002 DIPHENHYDRAMINE PER 50 MG: Performed by: NURSE PRACTITIONER

## 2023-08-03 PROCEDURE — 25010000002 HYDROMORPHONE 1 MG/ML SOLUTION: Performed by: UROLOGY

## 2023-08-03 PROCEDURE — 25010000002 METOCLOPRAMIDE PER 10 MG: Performed by: NURSE PRACTITIONER

## 2023-08-03 PROCEDURE — 25010000002 MAGNESIUM SULFATE 2 GM/50ML SOLUTION: Performed by: NURSE PRACTITIONER

## 2023-08-03 PROCEDURE — 25010000002 CALCIUM GLUCONATE 2-0.675 GM/100ML-% SOLUTION: Performed by: NURSE PRACTITIONER

## 2023-08-03 PROCEDURE — 85027 COMPLETE CBC AUTOMATED: CPT | Performed by: UROLOGY

## 2023-08-03 PROCEDURE — 25010000002 CEFAZOLIN IN DEXTROSE 2-4 GM/100ML-% SOLUTION: Performed by: UROLOGY

## 2023-08-03 PROCEDURE — 83735 ASSAY OF MAGNESIUM: CPT | Performed by: NURSE PRACTITIONER

## 2023-08-03 PROCEDURE — G0378 HOSPITAL OBSERVATION PER HR: HCPCS

## 2023-08-03 RX ORDER — NALOXONE HCL 0.4 MG/ML
0.1 VIAL (ML) INJECTION
Status: DISCONTINUED | OUTPATIENT
Start: 2023-08-03 | End: 2023-08-04 | Stop reason: HOSPADM

## 2023-08-03 RX ORDER — FAMOTIDINE 20 MG/1
40 TABLET, FILM COATED ORAL ONCE
Status: COMPLETED | OUTPATIENT
Start: 2023-08-03 | End: 2023-08-03

## 2023-08-03 RX ORDER — DIPHENHYDRAMINE HYDROCHLORIDE 50 MG/ML
25 INJECTION INTRAMUSCULAR; INTRAVENOUS ONCE
Status: COMPLETED | OUTPATIENT
Start: 2023-08-03 | End: 2023-08-03

## 2023-08-03 RX ORDER — METOCLOPRAMIDE HYDROCHLORIDE 5 MG/ML
10 INJECTION INTRAMUSCULAR; INTRAVENOUS ONCE
Status: COMPLETED | OUTPATIENT
Start: 2023-08-03 | End: 2023-08-03

## 2023-08-03 RX ORDER — CEFAZOLIN SODIUM 2 G/100ML
2000 INJECTION, SOLUTION INTRAVENOUS EVERY 8 HOURS
Status: DISCONTINUED | OUTPATIENT
Start: 2023-08-03 | End: 2023-08-04 | Stop reason: HOSPADM

## 2023-08-03 RX ADMIN — Medication 3 ML: at 08:15

## 2023-08-03 RX ADMIN — SODIUM CHLORIDE 100 ML/HR: 9 INJECTION, SOLUTION INTRAVENOUS at 15:55

## 2023-08-03 RX ADMIN — DIPHENHYDRAMINE HYDROCHLORIDE 25 MG: 50 INJECTION, SOLUTION INTRAMUSCULAR; INTRAVENOUS at 00:48

## 2023-08-03 RX ADMIN — MAGNESIUM SULFATE HEPTAHYDRATE 2 G: 2 INJECTION, SOLUTION INTRAVENOUS at 01:54

## 2023-08-03 RX ADMIN — PANTOPRAZOLE SODIUM 40 MG: 40 TABLET, DELAYED RELEASE ORAL at 05:17

## 2023-08-03 RX ADMIN — POTASSIUM CHLORIDE 40 MEQ: 750 TABLET, EXTENDED RELEASE ORAL at 00:11

## 2023-08-03 RX ADMIN — POTASSIUM CHLORIDE 40 MEQ: 750 TABLET, EXTENDED RELEASE ORAL at 04:24

## 2023-08-03 RX ADMIN — CALCIUM GLUCONATE 2000 MG: 20 INJECTION, SOLUTION INTRAVENOUS at 00:18

## 2023-08-03 RX ADMIN — SODIUM CHLORIDE 100 ML/HR: 9 INJECTION, SOLUTION INTRAVENOUS at 00:51

## 2023-08-03 RX ADMIN — OXYCODONE AND ACETAMINOPHEN 1 TABLET: 7.5; 325 TABLET ORAL at 17:13

## 2023-08-03 RX ADMIN — CEFAZOLIN SODIUM 2000 MG: 2 INJECTION, SOLUTION INTRAVENOUS at 02:33

## 2023-08-03 RX ADMIN — FAMOTIDINE 40 MG: 20 TABLET, FILM COATED ORAL at 13:57

## 2023-08-03 RX ADMIN — CALCIUM GLUCONATE 2000 MG: 20 INJECTION, SOLUTION INTRAVENOUS at 02:44

## 2023-08-03 RX ADMIN — HYDROMORPHONE HYDROCHLORIDE 1 MG: 1 INJECTION, SOLUTION INTRAMUSCULAR; INTRAVENOUS; SUBCUTANEOUS at 17:45

## 2023-08-03 RX ADMIN — CEFAZOLIN SODIUM 2000 MG: 2 INJECTION, SOLUTION INTRAVENOUS at 10:12

## 2023-08-03 RX ADMIN — CEFAZOLIN SODIUM 2000 MG: 2 INJECTION, SOLUTION INTRAVENOUS at 18:53

## 2023-08-03 RX ADMIN — METOCLOPRAMIDE 10 MG: 5 INJECTION, SOLUTION INTRAMUSCULAR; INTRAVENOUS at 00:48

## 2023-08-03 RX ADMIN — SIMETHICONE 80 MG: 80 TABLET, CHEWABLE ORAL at 05:08

## 2023-08-03 RX ADMIN — Medication 3 ML: at 20:56

## 2023-08-03 NOTE — PROGRESS NOTES
"  [facility] Progress Note    Chief Complaint:  abdomina pain    Rough nite with abdominal pain, bloating, nausea but he is better  No catheter issues    Review of Systems:    The following systems were reviewed and negative;  respiratory and cardiovascular          Vital Signs  /84 (BP Location: Right arm, Patient Position: Lying)   Pulse 85   Temp 99.1 øF (37.3 øC) (Oral)   Resp 20   Ht 177.8 cm (70\")   Wt 83 kg (182 lb 15.7 oz)   SpO2 97%   BMI 26.26 kg/mý     Physical Exam:     General Appearance:    Alert, cooperative, NAD   HEENT:    No trauma, pupils reactive, hearing intact   Back:     No CVA tenderness   Lungs:     Respirations unlabored, no wheezing    Heart:    RRR, intact peripheral pulses   Abdomen:   Soft nondistended no bladder distention.   :  Penis normal Kuhn catheter anchored   Extremities:   No edema, no deformity   Lymphatic:   No neck or groin LAD   Skin:   No bleeding, bruising or rashes   Neuro/Psych:   Orientation intact, mood/affect pleasant, no focal findings        Results Review:     I reviewed the patient's new clinical results.  Lab Results (last 24 hours)       Procedure Component Value Units Date/Time    Basic Metabolic Panel [[id number]]  (Abnormal) Collected: 08/03/23 0637    Specimen: Blood from Arm, Left Updated: 08/03/23 0721     Glucose 165 mg/dL      BUN 23 mg/dL      Creatinine 1.78 mg/dL      Sodium 138 mmol/L      Potassium 5.1 mmol/L      Chloride 109 mmol/L      CO2 19.3 mmol/L      Calcium 9.5 mg/dL      BUN/Creatinine Ratio 12.9     Anion Gap 9.7 mmol/L      eGFR 41.8 mL/min/1.73     Narrative:      GFR Normal >60  Chronic Kidney Disease <60  Kidney Failure <15      Magnesium [[id number]]  (Abnormal) Collected: 08/03/23 0637    Specimen: Blood from Arm, Left Updated: 08/03/23 0721     Magnesium 3.0 mg/dL     CBC (No Diff) [[id number]]  (Abnormal) Collected: 08/03/23 0637    Specimen: Blood from Arm, Left Updated: 08/03/23 0714     WBC 13.72 10*3/mm3      " RBC 3.25 10*6/mm3      Hemoglobin 10.4 g/dL      Hematocrit 30.1 %      MCV 92.6 fL      MCH 32.0 pg      MCHC 34.6 g/dL      RDW 12.6 %      RDW-SD 42.4 fl      MPV 10.3 fL      Platelets 195 10*3/mm3     POC Glucose Once [[id number]]  (Abnormal) Collected: 08/02/23 1841    Specimen: Blood Updated: 08/02/23 1842     Glucose 274 mg/dL      Comment: Meter: [id number] : [facility] Juana Wilson RN       Magnesium [[id number]]  (Abnormal) Collected: 08/02/23 1657    Specimen: Blood from Arm, Right Updated: 08/02/23 1818     Magnesium 1.3 mg/dL     Comprehensive Metabolic Panel [[id number]]  (Abnormal) Collected: 08/02/23 1657    Specimen: Blood from Arm, Right Updated: 08/02/23 1737     Glucose 160 mg/dL      BUN 14 mg/dL      Creatinine 0.65 mg/dL      Sodium 143 mmol/L      Potassium 2.6 mmol/L      Chloride 118 mmol/L      CO2 18.0 mmol/L      Calcium 6.0 mg/dL      Total Protein 4.1 g/dL      Albumin 2.3 g/dL      ALT (SGPT) 13 U/L      AST (SGOT) 14 U/L      Alkaline Phosphatase 41 U/L      Total Bilirubin 0.6 mg/dL      Globulin 1.8 gm/dL      A/G Ratio 1.3 g/dL      BUN/Creatinine Ratio 21.5     Anion Gap 7.0 mmol/L      eGFR 104.6 mL/min/1.73     Narrative:      GFR Normal >60  Chronic Kidney Disease <60  Kidney Failure <15      CBC & Differential [[id number]]  (Abnormal) Collected: 08/02/23 1657    Specimen: Blood from Arm, Right Updated: 08/02/23 1710    Narrative:      The following orders were created for panel order CBC & Differential.  Procedure                               Abnormality         Status                     ---------                               -----------         ------                     CBC Auto Differential[[id number]]        Abnormal            Final result                 Please view results for these tests on the individual orders.    CBC Auto Differential [[id number]]  (Abnormal) Collected: 08/02/23 1657    Specimen: Blood from Arm, Right Updated: 08/02/23 1710     WBC 14.03 10*3/mm3       RBC 4.02 10*6/mm3      Hemoglobin 13.2 g/dL      Hematocrit 37.7 %      MCV 93.8 fL      MCH 32.8 pg      MCHC 35.0 g/dL      RDW 12.3 %      RDW-SD 41.9 fl      MPV 10.0 fL      Platelets 266 10*3/mm3      Neutrophil % 92.6 %      Lymphocyte % 3.0 %      Monocyte % 3.8 %      Eosinophil % 0.0 %      Basophil % 0.2 %      Immature Grans % 0.4 %      Neutrophils, Absolute 12.98 10*3/mm3      Lymphocytes, Absolute 0.42 10*3/mm3      Monocytes, Absolute 0.54 10*3/mm3      Eosinophils, Absolute 0.00 10*3/mm3      Basophils, Absolute 0.03 10*3/mm3      Immature Grans, Absolute 0.06 10*3/mm3      nRBC 0.0 /100 WBC     Tissue Pathology Exam [[id number]] Collected: 08/02/23 1000    Specimen: Tissue from Prostate Updated: 08/02/23 1126          Imaging Results (Last 24 Hours)       Procedure Component Value Units Date/Time    US Renal Bilateral [[id number]] Collected: 08/02/23 2242     Updated: 08/02/23 2300    Narrative:      Renal sonogram     HISTORY: Hemorrhage observed in the urinary bladder on CT earlier today.  Horseshoe kidney was also noted and hydronephrosis of the left side of  the kidney was observed on the CT earlier today.     TECHNIQUE: Renal sonogram was attempted and is correlated with the CT  from earlier today.     FINDINGS: Hydronephrosis of the left side renal collecting system is  observed. The exam was apparently technically challenging due to bowel  gas. Renal parenchymal detail is no where as a fully assessed on this  poor quality exam relative to the noncontrast CT from earlier today. No  focal renal lesion identified. No hydronephrosis on the right. Kuhn  catheter within the decompressed urinary bladder.       Impression:      Renal sonogram is of limited yield, providing no additional  information relative to the CT performed two hours earlier.     This report was finalized on 8/2/2023 10:57 PM by Dr. Prasanna Fuller M.D.       CT Abdomen Pelvis Without Contrast [[id number]] Collected: 08/02/23  1913     Updated: 08/02/23 1922    Narrative:      CT ABDOMEN AND PELVIS WITHOUT IV CONTRAST     HISTORY: Abdominal pain, postop Aquablation of the prostate, hematuria     TECHNIQUE: Radiation dose reduction techniques were utilized, including  automated exposure control and exposure modulation based on body size.  Axial images were obtained through the abdomen and pelvis without the  administration of IV contrast. Coronal and sagittal reformatted images  were obtained.     COMPARISON: None available     FINDINGS:      ABDOMEN:  There is mild atelectasis in the lung bases. There are some tiny cysts  in the liver. The gallbladder appears unremarkable. Spleen is  unremarkable. There is a horseshoe kidney. There are some tiny  nonobstructing stones in the left side of the horseshoe kidney. There is  also left-sided hydronephrosis. There is no hydronephrosis on the right.  The adrenal glands are unremarkable. The pancreas is unremarkable.     PELVIS:  There is a Kuhn catheter in the bladder. There is high attenuation  material in the bladder consistent with hemorrhage. The prostate is  enlarged. There is some stranding in the lower pelvis and also along the  psoas musculature. There are some gas bubbles seen in the bladder.  Colonic diverticulosis without evidence of diverticulitis. Degenerative  changes of the lower lumbar spine.       Impression:      1. Horseshoe kidney with left-sided hydronephrosis  2. Kuhn catheter in the bladder. There is high attenuation material in  the bladder consistent with hemorrhage.  3. Enlarged prostate  4. There is some inflammatory stranding in the pelvis.     Radiation dose reduction techniques were utilized, including automated  exposure control and exposure modulation based on body size.     This report was finalized on 8/2/2023 7:19 PM by Dr. Julio C Garrett M.D.               Medication Review:   I have personally reviewed    Current Facility-Administered Medications:      Calcium Replacement - Follow Nurse / BPA Driven Protocol, , Does not apply, PRN, Elin Mackey PA    ceFAZolin in dextrose (ANCEF) IVPB solution 2,000 mg, 2,000 mg, Intravenous, Q8H, Nikita Garrett MD, 2,000 mg at 08/03/23 0233    HYDROmorphone (DILAUDID) injection 0.5 mg, 0.5 mg, Intravenous, Q2H PRN, 0.5 mg at 08/02/23 1833 **AND** naloxone (NARCAN) injection 0.1 mg, 0.1 mg, Intravenous, Q5 Min PRN, Nikita Garrett MD    Magnesium Standard Dose Replacement - Follow Nurse / BPA Driven Protocol, , Does not apply, PRN, Elin Mackey PA    nitroglycerin (NITROSTAT) SL tablet 0.4 mg, 0.4 mg, Sublingual, Q5 Min PRN, Elin Mackey PA    ondansetron (ZOFRAN) tablet 4 mg, 4 mg, Oral, Q6H PRN **OR** ondansetron (ZOFRAN) injection 4 mg, 4 mg, Intravenous, Q6H PRN, Nikita Garrett MD, 4 mg at 08/02/23 2115    oxyCODONE-acetaminophen (PERCOCET) 7.5-325 MG per tablet 1 tablet, 1 tablet, Oral, Q4H PRN, Nikita Garrett MD, 1 tablet at 08/02/23 1941    pantoprazole (PROTONIX) EC tablet 40 mg, 40 mg, Oral, Q AM, Nikita Garrett MD, 40 mg at 08/03/23 0517    Phosphorus Replacement - Follow Nurse / BPA Driven Protocol, , Does not apply, PRN, Elin Mackey PA    Potassium Replacement - Follow Nurse / BPA Driven Protocol, , Does not apply, PRTALON, Elin Mackey PA    sennosides-docusate (PERICOLACE) 8.6-50 MG per tablet 2 tablet, 2 tablet, Oral, BID, Nikita Garrett MD, 2 tablet at 08/02/23 2000    simethicone (MYLICON) chewable tablet 80 mg, 80 mg, Oral, 4x Daily PRN, Elin Mackey PA, 80 mg at 08/03/23 0508    sodium chloride 0.9 % flush 10 mL, 10 mL, Intravenous, PRN, Nikita Garrett MD    sodium chloride 0.9 % flush 3 mL, 3 mL, Intravenous, Q12H, Nikita Garrett MD, 3 mL at 08/02/23 2000    sodium chloride 0.9 % infusion 40 mL, 40 mL, Intravenous, PRN, Nikita Garrett MD    sodium chloride 0.9 % infusion, 100 mL/hr, Intravenous, Continuous, Smith,  Nikita MARTINEZ MD, Last Rate: 100 mL/hr at 08/03/23 0051, 100 mL/hr at 08/03/23 0051    Allergies:    Patient has no known allergies.    Assessment:    Active Problems:    BPH with obstruction/lower urinary tract symptoms       Postop day 1 aqua ablation  Left hydronephrosis likely secondary to trigone edema    Plan:    Continue to wean down his irrigations today and hopefully get his catheter out today or tomorrow.  He should stay due to his elevated creatinine to assure that this hydronephrosis is resolving and recheck his BMP in the a.m.      Nikita Garrett MD    8/3/2023  07:37 EDT

## 2023-08-03 NOTE — PROGRESS NOTES
"  [facility] Progress Note    Chief Complaint:  flank pain    Having considerable flank pain today, no nausea  Kuhn was just removed.    Review of Systems:    The following systems were reviewed and negative;  respiratory and cardiovascular          Vital Signs  /86 (BP Location: Right arm, Patient Position: Lying)   Pulse 76   Temp 99.1 øF (37.3 øC) (Oral)   Resp 18   Ht 177.8 cm (70\")   Wt 83 kg (182 lb 15.7 oz)   SpO2 97%   BMI 26.26 kg/mý     Physical Exam:     General Appearance:    Alert, cooperative, NAD   HEENT:    No trauma, pupils reactive, hearing intact   Back:     No CVA tenderness   Lungs:     Respirations unlabored, no wheezing    Heart:    RRR, intact peripheral pulses   Abdomen:     Soft and tender in left flank   :    Phallus nl   Extremities:   No edema, no deformity   Lymphatic:   No neck or groin LAD   Skin:   No bleeding, bruising or rashes   Neuro/Psych:   Orientation intact, mood/affect pleasant, no focal findings        Results Review:     I reviewed the patient's new clinical results.  Lab Results (last 24 hours)       Procedure Component Value Units Date/Time    Tissue Pathology Exam [[id number]] Collected: 08/02/23 1000    Specimen: Tissue from Prostate Updated: 08/03/23 1359     Case Report --     Surgical Pathology Report                         Case: [id number]                                  Authorizing Provider:  Nikita Garrett MD   Collected:           08/02/2023 10:00 AM          Ordering Location:     [facility]  Received:            08/02/2023 11:26 AM                                 MAIN OR                                                                      Pathologist:           Stacia Gillespie MD                                                    Specimen:    Prostate, PROSTATE CHIPS                                                                    Final Diagnosis --     1. Prostate, Transurethral Resection (20 Grams):   A. " "Fragments of benign prostate with changes consistent with nodular hyperplasia (BPH).   B. Fragment of metaplastic squamous mucosa.   C. Negative for malignancy.    rexm/perrym        Gross Description --     1. Prostate.  Received in formalin labeled \"prostate\" is a 20 gram, 6.5 x 4.0 x 3.5 cm aggregate of tan pink rubbery tissue fragments and clotted blood. The specimen is entirely submitted as 1A- 1I.     jap/uso/swm/perrym         Basic Metabolic Panel [[id number]]  (Abnormal) Collected: 08/03/23 0637    Specimen: Blood from Arm, Left Updated: 08/03/23 0721     Glucose 165 mg/dL      BUN 23 mg/dL      Creatinine 1.78 mg/dL      Sodium 138 mmol/L      Potassium 5.1 mmol/L      Chloride 109 mmol/L      CO2 19.3 mmol/L      Calcium 9.5 mg/dL      BUN/Creatinine Ratio 12.9     Anion Gap 9.7 mmol/L      eGFR 41.8 mL/min/1.73     Narrative:      GFR Normal >60  Chronic Kidney Disease <60  Kidney Failure <15      Magnesium [[id number]]  (Abnormal) Collected: 08/03/23 0637    Specimen: Blood from Arm, Left Updated: 08/03/23 0721     Magnesium 3.0 mg/dL     CBC (No Diff) [[id number]]  (Abnormal) Collected: 08/03/23 0637    Specimen: Blood from Arm, Left Updated: 08/03/23 0714     WBC 13.72 10*3/mm3      RBC 3.25 10*6/mm3      Hemoglobin 10.4 g/dL      Hematocrit 30.1 %      MCV 92.6 fL      MCH 32.0 pg      MCHC 34.6 g/dL      RDW 12.6 %      RDW-SD 42.4 fl      MPV 10.3 fL      Platelets 195 10*3/mm3     POC Glucose Once [[id number]]  (Abnormal) Collected: 08/02/23 1841    Specimen: Blood Updated: 08/02/23 1842     Glucose 274 mg/dL      Comment: Meter: [id number] : steven Wilson RN       Magnesium [[id number]]  (Abnormal) Collected: 08/02/23 1657    Specimen: Blood from Arm, Right Updated: 08/02/23 1818     Magnesium 1.3 mg/dL     Comprehensive Metabolic Panel [[id number]]  (Abnormal) Collected: 08/02/23 1657    Specimen: Blood from Arm, Right Updated: 08/02/23 7584     Glucose 160 mg/dL      BUN 14 mg/dL      Creatinine " 0.65 mg/dL      Sodium 143 mmol/L      Potassium 2.6 mmol/L      Chloride 118 mmol/L      CO2 18.0 mmol/L      Calcium 6.0 mg/dL      Total Protein 4.1 g/dL      Albumin 2.3 g/dL      ALT (SGPT) 13 U/L      AST (SGOT) 14 U/L      Alkaline Phosphatase 41 U/L      Total Bilirubin 0.6 mg/dL      Globulin 1.8 gm/dL      A/G Ratio 1.3 g/dL      BUN/Creatinine Ratio 21.5     Anion Gap 7.0 mmol/L      eGFR 104.6 mL/min/1.73     Narrative:      GFR Normal >60  Chronic Kidney Disease <60  Kidney Failure <15            Imaging Results (Last 24 Hours)       Procedure Component Value Units Date/Time    US Renal Bilateral [[id number]] Collected: 08/02/23 2242     Updated: 08/02/23 2300    Narrative:      Renal sonogram     HISTORY: Hemorrhage observed in the urinary bladder on CT earlier today.  Horseshoe kidney was also noted and hydronephrosis of the left side of  the kidney was observed on the CT earlier today.     TECHNIQUE: Renal sonogram was attempted and is correlated with the CT  from earlier today.     FINDINGS: Hydronephrosis of the left side renal collecting system is  observed. The exam was apparently technically challenging due to bowel  gas. Renal parenchymal detail is no where as a fully assessed on this  poor quality exam relative to the noncontrast CT from earlier today. No  focal renal lesion identified. No hydronephrosis on the right. Kuhn  catheter within the decompressed urinary bladder.       Impression:      Renal sonogram is of limited yield, providing no additional  information relative to the CT performed two hours earlier.     This report was finalized on 8/2/2023 10:57 PM by Dr. Prasanna Fuller M.D.       CT Abdomen Pelvis Without Contrast [[id number]] Collected: 08/02/23 1913     Updated: 08/02/23 1922    Narrative:      CT ABDOMEN AND PELVIS WITHOUT IV CONTRAST     HISTORY: Abdominal pain, postop Aquablation of the prostate, hematuria     TECHNIQUE: Radiation dose reduction techniques were utilized,  including  automated exposure control and exposure modulation based on body size.  Axial images were obtained through the abdomen and pelvis without the  administration of IV contrast. Coronal and sagittal reformatted images  were obtained.     COMPARISON: None available     FINDINGS:      ABDOMEN:  There is mild atelectasis in the lung bases. There are some tiny cysts  in the liver. The gallbladder appears unremarkable. Spleen is  unremarkable. There is a horseshoe kidney. There are some tiny  nonobstructing stones in the left side of the horseshoe kidney. There is  also left-sided hydronephrosis. There is no hydronephrosis on the right.  The adrenal glands are unremarkable. The pancreas is unremarkable.     PELVIS:  There is a Kuhn catheter in the bladder. There is high attenuation  material in the bladder consistent with hemorrhage. The prostate is  enlarged. There is some stranding in the lower pelvis and also along the  psoas musculature. There are some gas bubbles seen in the bladder.  Colonic diverticulosis without evidence of diverticulitis. Degenerative  changes of the lower lumbar spine.       Impression:      1. Horseshoe kidney with left-sided hydronephrosis  2. Kuhn catheter in the bladder. There is high attenuation material in  the bladder consistent with hemorrhage.  3. Enlarged prostate  4. There is some inflammatory stranding in the pelvis.     Radiation dose reduction techniques were utilized, including automated  exposure control and exposure modulation based on body size.     This report was finalized on 8/2/2023 7:19 PM by Dr. Julio C Garrett M.D.               Medication Review:   I have personally reviewed    Current Facility-Administered Medications:     Calcium Replacement - Follow Nurse / BPA Driven Protocol, , Does not apply, PRN, Elin Mackey, PA    HYDROmorphone (DILAUDID) injection 1 mg, 1 mg, Intravenous, Q2H PRN **AND** naloxone (NARCAN) injection 0.1 mg, 0.1 mg, Intravenous,  Q5 Min PRN, Nikita Garrett MD    Magnesium Standard Dose Replacement - Follow Nurse / BPA Driven Protocol, , Does not apply, PRTALON, Elin Mackey PA    nitroglycerin (NITROSTAT) SL tablet 0.4 mg, 0.4 mg, Sublingual, Q5 Min PRN, Elin Mackey PA    ondansetron (ZOFRAN) tablet 4 mg, 4 mg, Oral, Q6H PRN **OR** ondansetron (ZOFRAN) injection 4 mg, 4 mg, Intravenous, Q6H PRN, Nikita Garrett MD, 4 mg at 08/02/23 2115    oxyCODONE-acetaminophen (PERCOCET) 7.5-325 MG per tablet 1 tablet, 1 tablet, Oral, Q4H PRN, Nikita Garrett MD, 1 tablet at 08/03/23 1713    pantoprazole (PROTONIX) EC tablet 40 mg, 40 mg, Oral, Q AM, Nikita Garrett MD, 40 mg at 08/03/23 0517    Phosphorus Replacement - Follow Nurse / BPA Driven Protocol, , Does not apply, PRNarendra HANLEY Natalie F, PA    Potassium Replacement - Follow Nurse / BPA Driven Protocol, , Does not apply, Narendra CRUZ Natalie F, PA    sennosides-docusate (PERICOLACE) 8.6-50 MG per tablet 2 tablet, 2 tablet, Oral, BID, Nikita Garrett MD, 2 tablet at 08/02/23 2000    simethicone (MYLICON) chewable tablet 80 mg, 80 mg, Oral, 4x Daily PRN, Elin Mackey PA, 80 mg at 08/03/23 0508    sodium chloride 0.9 % flush 10 mL, 10 mL, Intravenous, PRN, Nikita Garrett MD    sodium chloride 0.9 % flush 3 mL, 3 mL, Intravenous, Q12H, Nikita Garrett MD, 3 mL at 08/03/23 0815    sodium chloride 0.9 % infusion 40 mL, 40 mL, Intravenous, PRN, Nikita Garrett MD    sodium chloride 0.9 % infusion, 100 mL/hr, Intravenous, Continuous, Nikita Garrett MD, Last Rate: 100 mL/hr at 08/03/23 1555, 100 mL/hr at 08/03/23 1555    Allergies:    Patient has no known allergies.    Assessment:    Active Problems:    BPH with obstruction/lower urinary tract symptoms       Left Flank pain due to obstructed left ureter likely due to trigone edema    Plan:    He may need stent tomorrow if no improvement over nite.       Nikita Garrett,  MD    8/3/2023  17:21 EDT

## 2023-08-03 NOTE — PROGRESS NOTES
MD ATTESTATION NOTE    The KRYSTIN and I have discussed this patient's history, physical exam, and treatment plan.  I have reviewed the documentation and personally had a face to face interaction with the patient. I affirm the documentation and agree with the treatment and plan.  The attached note describes my personal findings.      I provided a substantive portion of the care of the patient.  I personally performed the physical exam in its entirety, and below are my findings.  For this patient encounter, the patient wore surgical mask, I wore full protective PPE including N95 and eye protection.      Brief HPI: Patient with history of horseshoe kidney present observation unit following up ablation.    PHYSICAL EXAM  ED Triage Vitals [08/02/23 0738]   Temp Heart Rate Resp BP SpO2   97.5 øF (36.4 øC) 54 16 126/83 96 %      Temp src Heart Rate Source Patient Position BP Location FiO2 (%)   Oral Monitor Lying Right arm --         GENERAL: no acute distress  HENT: nares patent  EYES: no scleral icterus  CV: regular rhythm, normal rate  RESPIRATORY: normal effort  ABDOMEN: soft  MUSCULOSKELETAL: no deformity  NEURO: alert, moves all extremities, follows commands  PSYCH:  calm, cooperative  SKIN: warm, dry    Vital signs and nursing notes reviewed.        Plan: Patient's creatinine increased from 0.6-1.7.  Dr. Garrett with urology is on board.  Plan to monitor overnight.

## 2023-08-03 NOTE — PLAN OF CARE
Problem: Adult Inpatient Plan of Care  Goal: Plan of Care Review  8/3/2023 0707 by Arianna Ramirez RN  Outcome: Ongoing, Progressing  Flowsheets (Taken 8/3/2023 0704)  Progress: improving  Plan of Care Reviewed With: patient  Outcome Evaluation: Aquablation still infusing with color showing pink. Reduced the rate due to patient's complain of severe pain and pressure in the back. Administered dilaudid, percocet and IV lidocaine sequencially for patient's pain. US of the kidneys was performed. Patient vomited twice. Administered zofran initially but it was unsuccessful. Later administered reglan and benadryl. Patient got some relieve and rested. Patient's potassium, calcium and magnesium were low. Replaced electrolytes. Awaiting lab results. Sodium chloride is infusing at 100 ml/hr. Irrigated patient's CBI and got little clots out.  8/3/2023 0655 by Arianna Ramirez RN  Outcome: Ongoing, Progressing  Flowsheets (Taken 8/3/2023 0648)  Progress: improving  Plan of Care Reviewed With: patient  8/3/2023 0645 by Arianna Ramirez RN  Outcome: Ongoing, Progressing  Flowsheets (Taken 8/3/2023 0643)  Progress: improving  Plan of Care Reviewed With: patient  Goal: Patient-Specific Goal (Individualized)  8/3/2023 0707 by Arianna Ramirez RN  Outcome: Ongoing, Progressing  8/3/2023 0655 by Arianna Ramirez RN  Outcome: Ongoing, Progressing  8/3/2023 0645 by Arianna Ramirez RN  Outcome: Ongoing, Progressing  Goal: Absence of Hospital-Acquired Illness or Injury  8/3/2023 0707 by Arianna Ramirez RN  Outcome: Ongoing, Progressing  8/3/2023 0655 by Arianna Ramirez RN  Outcome: Ongoing, Progressing  8/3/2023 0645 by Arianna Ramirez RN  Outcome: Ongoing, Progressing  Intervention: Identify and Manage Fall Risk  Recent Flowsheet Documentation  Taken 8/3/2023 0600 by Arianna Ramirez RN  Safety Promotion/Fall Prevention:   assistive device/personal items within reach   fall prevention program maintained   clutter free environment maintained   nonskid  shoes/slippers when out of bed   room organization consistent   safety round/check completed  Taken 8/3/2023 0400 by Arianna Ramirez RN  Safety Promotion/Fall Prevention:   assistive device/personal items within reach   clutter free environment maintained   fall prevention program maintained   lighting adjusted   nonskid shoes/slippers when out of bed   room organization consistent   safety round/check completed  Taken 8/3/2023 0200 by Arianna Ramirez RN  Safety Promotion/Fall Prevention:   assistive device/personal items within reach   clutter free environment maintained   fall prevention program maintained   nonskid shoes/slippers when out of bed   room organization consistent   safety round/check completed   lighting adjusted  Taken 8/3/2023 0000 by Arianna Ramirez RN  Safety Promotion/Fall Prevention:   assistive device/personal items within reach   clutter free environment maintained   fall prevention program maintained   lighting adjusted   nonskid shoes/slippers when out of bed   room organization consistent   safety round/check completed  Taken 8/2/2023 2000 by Arianna Ramirez RN  Safety Promotion/Fall Prevention:   assistive device/personal items within reach   clutter free environment maintained   fall prevention program maintained   lighting adjusted   nonskid shoes/slippers when out of bed   safety round/check completed   room organization consistent  Intervention: Prevent Skin Injury  Recent Flowsheet Documentation  Taken 8/3/2023 0600 by Arianna Ramirez RN  Body Position: position changed independently  Taken 8/3/2023 0400 by Arianna Ramirez RN  Body Position: position changed independently  Taken 8/3/2023 0200 by Arianna Ramirez RN  Body Position: position changed independently  Taken 8/3/2023 0000 by Arianna Ramirez RN  Body Position: position changed independently  Taken 8/2/2023 2000 by Arianna Ramirez RN  Body Position: position changed independently  Intervention: Prevent and Manage VTE (Venous  Thromboembolism) Risk  Recent Flowsheet Documentation  Taken 8/3/2023 0400 by Arianna Ramirez RN  Activity Management: bedrest  Taken 8/3/2023 0200 by Arianna Ramirez RN  Activity Management: bedrest  Taken 8/3/2023 0000 by Arianna Ramirez RN  Activity Management: bedrest  Taken 8/2/2023 2000 by Arianna Ramirez RN  Activity Management: bedrest  VTE Prevention/Management:   bilateral   sequential compression devices on  Range of Motion: active ROM (range of motion) encouraged  Intervention: Prevent Infection  Recent Flowsheet Documentation  Taken 8/3/2023 0600 by Arianna Ramirez RN  Infection Prevention:   hand hygiene promoted   single patient room provided   rest/sleep promoted  Taken 8/3/2023 0400 by Arianna Ramirez RN  Infection Prevention:   hand hygiene promoted   rest/sleep promoted   single patient room provided  Taken 8/3/2023 0200 by Arianna Ramirez RN  Infection Prevention:   hand hygiene promoted   rest/sleep promoted   single patient room provided  Taken 8/2/2023 2000 by Arianna Ramirez RN  Infection Prevention:   hand hygiene promoted   rest/sleep promoted   single patient room provided  Goal: Optimal Comfort and Wellbeing  8/3/2023 0707 by Arianna Ramirez RN  Outcome: Ongoing, Progressing  8/3/2023 0655 by Arianna Rmairez RN  Outcome: Ongoing, Progressing  8/3/2023 0645 by Arianna Ramirez RN  Outcome: Ongoing, Progressing  Intervention: Monitor Pain and Promote Comfort  Recent Flowsheet Documentation  Taken 8/2/2023 1941 by Arianna Ramirez RN  Pain Management Interventions: see MAR  Intervention: Provide Person-Centered Care  Recent Flowsheet Documentation  Taken 8/2/2023 1941 by Arianna Ramirez RN  Trust Relationship/Rapport:   choices provided   care explained   questions encouraged   questions answered  Goal: Readiness for Transition of Care  8/3/2023 0707 by Arianna Ramirez RN  Outcome: Ongoing, Progressing  8/3/2023 0655 by Arianna Ramirez RN  Outcome: Ongoing, Progressing  8/3/2023 0645 by Arianna Ramirez  RN  Outcome: Ongoing, Progressing     Problem: Fall Injury Risk  Goal: Absence of Fall and Fall-Related Injury  8/3/2023 0707 by Arianna Ramirez RN  Outcome: Ongoing, Progressing  8/3/2023 0655 by Arianna Ramirez RN  Outcome: Ongoing, Progressing  8/3/2023 0645 by Arianna Ramirez RN  Outcome: Ongoing, Progressing  Intervention: Promote Injury-Free Environment  Recent Flowsheet Documentation  Taken 8/3/2023 0600 by Arianna Ramirez RN  Safety Promotion/Fall Prevention:   assistive device/personal items within reach   fall prevention program maintained   clutter free environment maintained   nonskid shoes/slippers when out of bed   room organization consistent   safety round/check completed  Taken 8/3/2023 0400 by Arianna Ramirez RN  Safety Promotion/Fall Prevention:   assistive device/personal items within reach   clutter free environment maintained   fall prevention program maintained   lighting adjusted   nonskid shoes/slippers when out of bed   room organization consistent   safety round/check completed  Taken 8/3/2023 0200 by Arianna Ramirez RN  Safety Promotion/Fall Prevention:   assistive device/personal items within reach   clutter free environment maintained   fall prevention program maintained   nonskid shoes/slippers when out of bed   room organization consistent   safety round/check completed   lighting adjusted  Taken 8/3/2023 0000 by Arianna Ramirez RN  Safety Promotion/Fall Prevention:   assistive device/personal items within reach   clutter free environment maintained   fall prevention program maintained   lighting adjusted   nonskid shoes/slippers when out of bed   room organization consistent   safety round/check completed  Taken 8/2/2023 2000 by Arianna Ramirez RN  Safety Promotion/Fall Prevention:   assistive device/personal items within reach   clutter free environment maintained   fall prevention program maintained   lighting adjusted   nonskid shoes/slippers when out of bed   safety round/check  completed   room organization consistent     Problem: Hypertension Comorbidity  Goal: Blood Pressure in Desired Range  8/3/2023 0707 by Arianna Ramirez RN  Outcome: Ongoing, Progressing  8/3/2023 0655 by Arianna Ramirez RN  Outcome: Ongoing, Progressing  8/3/2023 0645 by Arianna Ramirez RN  Outcome: Ongoing, Progressing   Goal Outcome Evaluation:  Plan of Care Reviewed With: patient        Progress: improving  Outcome Evaluation: Aquablation still infusing with color showing pink. Reduced the rate due to patient's complain of severe pain and pressure in the back. Administered dilaudid, percocet and IV lidocaine sequencially for patient's pain. US of the kidneys was performed. Patient vomited twice. Administered zofran initially but it was unsuccessful. Later administered reglan and benadryl. Patient got some relieve and rested. Patient's potassium, calcium and magnesium were low. Replaced electrolytes. Awaiting lab results. Sodium chloride is infusing at 100 ml/hr. Irrigated patient's CBI and got little clots out.

## 2023-08-03 NOTE — PLAN OF CARE
Goal Outcome Evaluation:              Outcome Evaluation: pt is aox4, vss, aquablation was d/c at 1600 and three way cath was removed per protocol color was clear, at 1745 pt started to comlain of increased pain and pressure in left flank area and bladder, pt was bladder scanned and 385 was present, provider was made aware and ordered for a indwelling mckeon cath to be placed, pt has a 18f coude cath with a 15cc ballon, urine was return and the pt stated he felt relief, pain mangement, pt to be npo at midnight for possible stent placement tomorrow per urology, pt has no further questions at this time, pt is resting

## 2023-08-03 NOTE — PROGRESS NOTES
.ED OBSERVATION PROGRESS/DISCHARGE SUMMARY    Date of Admission: 8/2/2023   LOS: 0 days   PCP: Nasra Burton APRN      Subjective  Resting comfortably this morning and in no acute distress.     Hospital Outcome:   63-year-old male was seen and examined at bedside following admission to the observation unit s/p aquablation for continuous bladder irrigation, pain management and antibiotic.  Patient complained of left flank pain that was radiating into left lower quadrant, CT abdomen pelvis was obtained and showed horseshoe kidney with left-sided hydronephrosis.  I spoke with Dr. Amaya with urology service and he has requested renal ultrasound which again confirms left-sided hydronephrosis.  Patient was medicated with narcotics however his pain persisted and complained of having flatus therefore simethicone given and patient reports that his abdominal and flank pain resolved.      Patient was seen by Dr. Garrett this morning, his creatinine went from 0.65-1.78 overnight despite IV fluids.  Patient does have horseshoe kidney, concern there is trigone edema.  He has requested patient stay an additional night to see if kidney function normalizes.  Discussed with patient who expresses understanding and is in agreement with plan.  I anticipate patient will be discharged home tomorrow.    ROS:  General: no fevers, chills  Respiratory: no cough, dyspnea  Cardiovascular: no chest pain, palpitations  Abdomen: No abdominal pain, nausea, vomiting, or diarrhea  Neurologic: No focal weakness    Objective   Physical Exam:  I have reviewed the vital signs.  Temp:  [97.4 øF (36.3 øC)-99.1 øF (37.3 øC)] 99.1 øF (37.3 øC)  Heart Rate:  [54-85] 85  Resp:  [16-20] 20  BP: ()/(60-96) 129/84  General Appearance:    Alert, cooperative, no distress  Head:    Normocephalic, atraumatic  Eyes:    Sclerae anicteric  Neck:   Supple, no mass  Lungs: Clear to auscultation bilaterally, respirations unlabored  Heart: Regular rate and  rhythm, S1 and S2 normal, no murmur, rub or gallop  Abdomen:  Soft, non-tender, bowel sounds active, nondistended  Extremities: No clubbing, cyanosis, or edema to lower extremities  Pulses:  2+ and symmetric in distal lower extremities  Skin: No rashes   Neurologic: Oriented x3, Normal strength to extremities    Results Review:    I have reviewed the labs, radiology results and diagnostic studies.    Results from last 7 days   Lab Units 08/02/23  1657   WBC 10*3/mm3 14.03*   HEMOGLOBIN g/dL 13.2   HEMATOCRIT % 37.7   PLATELETS 10*3/mm3 266     Results from last 7 days   Lab Units 08/02/23  1657   SODIUM mmol/L 143   POTASSIUM mmol/L 2.6*   CHLORIDE mmol/L 118*   CO2 mmol/L 18.0*   BUN mg/dL 14   CREATININE mg/dL 0.65*   CALCIUM mg/dL 6.0*   BILIRUBIN mg/dL 0.6   ALK PHOS U/L 41   ALT (SGPT) U/L 13   AST (SGOT) U/L 14   GLUCOSE mg/dL 160*     Imaging Results (Last 24 Hours)       Procedure Component Value Units Date/Time    US Renal Bilateral [[id number]] Collected: 08/02/23 2242     Updated: 08/02/23 2300    Narrative:      Renal sonogram     HISTORY: Hemorrhage observed in the urinary bladder on CT earlier today.  Horseshoe kidney was also noted and hydronephrosis of the left side of  the kidney was observed on the CT earlier today.     TECHNIQUE: Renal sonogram was attempted and is correlated with the CT  from earlier today.     FINDINGS: Hydronephrosis of the left side renal collecting system is  observed. The exam was apparently technically challenging due to bowel  gas. Renal parenchymal detail is no where as a fully assessed on this  poor quality exam relative to the noncontrast CT from earlier today. No  focal renal lesion identified. No hydronephrosis on the right. Kuhn  catheter within the decompressed urinary bladder.       Impression:      Renal sonogram is of limited yield, providing no additional  information relative to the CT performed two hours earlier.     This report was finalized on 8/2/2023 10:57  PM by Dr. Prasanna Fuller M.D.       CT Abdomen Pelvis Without Contrast [[id number]] Collected: 08/02/23 1913     Updated: 08/02/23 1922    Narrative:      CT ABDOMEN AND PELVIS WITHOUT IV CONTRAST     HISTORY: Abdominal pain, postop Aquablation of the prostate, hematuria     TECHNIQUE: Radiation dose reduction techniques were utilized, including  automated exposure control and exposure modulation based on body size.  Axial images were obtained through the abdomen and pelvis without the  administration of IV contrast. Coronal and sagittal reformatted images  were obtained.     COMPARISON: None available     FINDINGS:      ABDOMEN:  There is mild atelectasis in the lung bases. There are some tiny cysts  in the liver. The gallbladder appears unremarkable. Spleen is  unremarkable. There is a horseshoe kidney. There are some tiny  nonobstructing stones in the left side of the horseshoe kidney. There is  also left-sided hydronephrosis. There is no hydronephrosis on the right.  The adrenal glands are unremarkable. The pancreas is unremarkable.     PELVIS:  There is a Kuhn catheter in the bladder. There is high attenuation  material in the bladder consistent with hemorrhage. The prostate is  enlarged. There is some stranding in the lower pelvis and also along the  psoas musculature. There are some gas bubbles seen in the bladder.  Colonic diverticulosis without evidence of diverticulitis. Degenerative  changes of the lower lumbar spine.       Impression:      1. Horseshoe kidney with left-sided hydronephrosis  2. Kuhn catheter in the bladder. There is high attenuation material in  the bladder consistent with hemorrhage.  3. Enlarged prostate  4. There is some inflammatory stranding in the pelvis.     Radiation dose reduction techniques were utilized, including automated  exposure control and exposure modulation based on body size.     This report was finalized on 8/2/2023 7:19 PM by Dr. Julio C Garrett M.D.                I have reviewed the medications.  ---------------------------------------------------------------------------------------------  Assessment & Plan   Assessment/Problem List    BPH with obstruction/lower urinary tract symptoms      Plan:  BPH with obstruction/lower urinary tract symptoms  -Status post aquablation with Dr. Gordo Garrett  -Continuous bladder irrigation per urology  -Antibiotics per urology  -Pain control as needed  -Kuhn catheter pulled, patient has voided on his own    ODALYS  -Creatinine on admission 0.65, up to 1.78 today  -IV fluids  -Trend with a.m. labs     Left flank pain  -Resolved  -CT abdomen pelvis shows horseshoe kidney with left-sided hydronephrosis.  Kuhn catheter in the bladder.  There is high attenuation material in the bladder consistent with hemorrhage and some inflammatory stranding the pelvis  -Urology consulted and has requested renal ultrasound which shows left-sided hydronephrosis however the exam was challenging due to bowel gas    Hypokalemia  -Resolved   -Potassium 2.6 on admission  -Replaced orally, repeat potassium 5.1    GERD  -Continue PPI    Disposition: Anticipate patient will be discharged home tomorrow    This note will serve as progress note    Nicholas Solomon, ZANE 08/03/23 06:27 EDT    I have worn appropriate PPE during this patient encounter, sanitized my hands both with entering and exiting patient's room.    51 minutes has been spent by [facility] providers in the care of this patient while under observation status

## 2023-08-04 ENCOUNTER — READMISSION MANAGEMENT (OUTPATIENT)
Dept: CALL CENTER | Facility: HOSPITAL | Age: 63
End: 2023-08-04
Payer: OTHER GOVERNMENT

## 2023-08-04 VITALS
BODY MASS INDEX: 26.2 KG/M2 | RESPIRATION RATE: 18 BRPM | WEIGHT: 182.98 LBS | OXYGEN SATURATION: 95 % | DIASTOLIC BLOOD PRESSURE: 82 MMHG | HEART RATE: 80 BPM | HEIGHT: 70 IN | SYSTOLIC BLOOD PRESSURE: 134 MMHG | TEMPERATURE: 98.2 F

## 2023-08-04 LAB
ANION GAP SERPL CALCULATED.3IONS-SCNC: 8 MMOL/L (ref 5–15)
BUN SERPL-MCNC: 14 MG/DL (ref 8–23)
BUN/CREAT SERPL: 13.2 (ref 7–25)
CALCIUM SPEC-SCNC: 7.9 MG/DL (ref 8.6–10.5)
CHLORIDE SERPL-SCNC: 109 MMOL/L (ref 98–107)
CO2 SERPL-SCNC: 21 MMOL/L (ref 22–29)
CREAT SERPL-MCNC: 1.06 MG/DL (ref 0.76–1.27)
DEPRECATED RDW RBC AUTO: 44.6 FL (ref 37–54)
EGFRCR SERPLBLD CKD-EPI 2021: 77.9 ML/MIN/1.73
ERYTHROCYTE [DISTWIDTH] IN BLOOD BY AUTOMATED COUNT: 12.8 % (ref 12.3–15.4)
GLUCOSE SERPL-MCNC: 108 MG/DL (ref 65–99)
HCT VFR BLD AUTO: 26.2 % (ref 37.5–51)
HGB BLD-MCNC: 8.9 G/DL (ref 13–17.7)
MCH RBC QN AUTO: 32.1 PG (ref 26.6–33)
MCHC RBC AUTO-ENTMCNC: 34 G/DL (ref 31.5–35.7)
MCV RBC AUTO: 94.6 FL (ref 79–97)
PLATELET # BLD AUTO: 144 10*3/MM3 (ref 140–450)
PMV BLD AUTO: 10.9 FL (ref 6–12)
POTASSIUM SERPL-SCNC: 4.4 MMOL/L (ref 3.5–5.2)
RBC # BLD AUTO: 2.77 10*6/MM3 (ref 4.14–5.8)
SODIUM SERPL-SCNC: 138 MMOL/L (ref 136–145)
WBC NRBC COR # BLD: 13.87 10*3/MM3 (ref 3.4–10.8)

## 2023-08-04 PROCEDURE — 80048 BASIC METABOLIC PNL TOTAL CA: CPT | Performed by: UROLOGY

## 2023-08-04 PROCEDURE — 63710000001 ONDANSETRON PER 8 MG: Performed by: UROLOGY

## 2023-08-04 PROCEDURE — 25010000002 CEFAZOLIN IN DEXTROSE 2-4 GM/100ML-% SOLUTION: Performed by: UROLOGY

## 2023-08-04 PROCEDURE — G0378 HOSPITAL OBSERVATION PER HR: HCPCS

## 2023-08-04 PROCEDURE — 85027 COMPLETE CBC AUTOMATED: CPT | Performed by: UROLOGY

## 2023-08-04 PROCEDURE — 25010000002 HYDROMORPHONE 1 MG/ML SOLUTION: Performed by: UROLOGY

## 2023-08-04 RX ORDER — ONDANSETRON 4 MG/1
4 TABLET, ORALLY DISINTEGRATING ORAL EVERY 8 HOURS PRN
Qty: 30 TABLET | Refills: 0 | Status: SHIPPED | OUTPATIENT
Start: 2023-08-04

## 2023-08-04 RX ORDER — CEPHALEXIN 500 MG/1
500 CAPSULE ORAL 3 TIMES DAILY
Qty: 21 CAPSULE | Refills: 0 | Status: SHIPPED | OUTPATIENT
Start: 2023-08-04

## 2023-08-04 RX ORDER — OXYCODONE HYDROCHLORIDE AND ACETAMINOPHEN 5; 325 MG/1; MG/1
1 TABLET ORAL EVERY 4 HOURS PRN
Qty: 10 TABLET | Refills: 0 | Status: SHIPPED | OUTPATIENT
Start: 2023-08-04

## 2023-08-04 RX ADMIN — PANTOPRAZOLE SODIUM 40 MG: 40 TABLET, DELAYED RELEASE ORAL at 08:52

## 2023-08-04 RX ADMIN — ONDANSETRON HYDROCHLORIDE 4 MG: 4 TABLET, FILM COATED ORAL at 12:02

## 2023-08-04 RX ADMIN — HYDROMORPHONE HYDROCHLORIDE 1 MG: 1 INJECTION, SOLUTION INTRAMUSCULAR; INTRAVENOUS; SUBCUTANEOUS at 06:49

## 2023-08-04 RX ADMIN — CEFAZOLIN SODIUM 2000 MG: 2 INJECTION, SOLUTION INTRAVENOUS at 01:34

## 2023-08-04 RX ADMIN — Medication 3 ML: at 08:52

## 2023-08-04 RX ADMIN — OXYCODONE AND ACETAMINOPHEN 1 TABLET: 7.5; 325 TABLET ORAL at 12:02

## 2023-08-04 RX ADMIN — OXYCODONE AND ACETAMINOPHEN 1 TABLET: 7.5; 325 TABLET ORAL at 05:58

## 2023-08-04 NOTE — PROGRESS NOTES
GLENN ARIAS ATTESTATION NOTE    The KRYSTIN and I have discussed this patient's history, physical exam, and treatment plan.  I have reviewed the documentation and personally had a face to face interaction with the patient. I affirm the documentation and agree with the treatment and plan.  The attached note describes my personal findings.      I provided a substantive portion of the care of this patient. I personally performed the physical exam, in its entirety.    Andrew Miranda is a 63 y.o. male who presented to the observation unit 2 days ago after an aquablation was performed by urology.  He has been continued on continuous irrigation.  He had an increase in his creatinine on postoperative day 1 and was kept an additional night.  Today his creatinine has improved.  Urology has evaluated him and feels he is safe for discharge home with Kuhn catheter in place.  The patient denies any current pain.  He denies any current nausea or vomiting.  His Kuhn catheter has dark red urine and some small clots.      On exam:  GENERAL: Awake, alert, no acute distress  SKIN: Warm, dry  HENT: Normocephalic, atraumatic  EYES: no scleral icterus  CV: regular rhythm, regular rate  RESPIRATORY: normal effort, lungs clear  ABDOMEN: soft, nontender, nondistended  MUSCULOSKELETAL: no deformity  NEURO: alert, moves all extremities, follows commands        Labs  Recent Results (from the past 24 hour(s))   CBC (No Diff)    Collection Time: 08/04/23  4:18 AM    Specimen: Blood   Result Value Ref Range    WBC 13.87 (H) 3.40 - 10.80 10*3/mm3    RBC 2.77 (L) 4.14 - 5.80 10*6/mm3    Hemoglobin 8.9 (L) 13.0 - 17.7 g/dL    Hematocrit 26.2 (L) 37.5 - 51.0 %    MCV 94.6 79.0 - 97.0 fL    MCH 32.1 26.6 - 33.0 pg    MCHC 34.0 31.5 - 35.7 g/dL    RDW 12.8 12.3 - 15.4 %    RDW-SD 44.6 37.0 - 54.0 fl    MPV 10.9 6.0 - 12.0 fL    Platelets 144 140 - 450 10*3/mm3   Basic Metabolic Panel    Collection Time: 08/04/23  4:18 AM    Specimen: Blood   Result Value  Ref Range    Glucose 108 (H) 65 - 99 mg/dL    BUN 14 8 - 23 mg/dL    Creatinine 1.06 0.76 - 1.27 mg/dL    Sodium 138 136 - 145 mmol/L    Potassium 4.4 3.5 - 5.2 mmol/L    Chloride 109 (H) 98 - 107 mmol/L    CO2 21.0 (L) 22.0 - 29.0 mmol/L    Calcium 7.9 (L) 8.6 - 10.5 mg/dL    BUN/Creatinine Ratio 13.2 7.0 - 25.0    Anion Gap 8.0 5.0 - 15.0 mmol/L    eGFR 77.9 >60.0 mL/min/1.73       Radiology  No Radiology Exams Resulted Within Past 24 Hours          Note Disclaimer: At [facility], we believe that sharing information builds trust and better relationships. You are receiving this note because you recently visited [facility]. It is possible you will see health information before a provider has talked with you about it. This kind of information can be easy to misunderstand. To help you fully understand what it means for your health, we urge you to discuss this note with your provider.

## 2023-08-04 NOTE — PLAN OF CARE
Goal Outcome Evaluation: pt came to obs post aqua ablation for bhp. Upon 3 way mckeon irrigation removal on 8/3/23, pt began to have feeling of increased pressure/fullness. Per md order, mckeon catheter was placed by nurse at that time. Pt is discharging with mckeon catheter and instructions given about safe removal, safe daily care, bag position below groin area at all times and emptying procedure of mckeon cath bag. S/s of issues to watch for and all instructions were verified verbally and patient performed care with instruction from this nurse. Supplies were sent with patient for this care. Pt states understanding of medications and follow up instructions/appointements. Pt received dressing assistance from myself and received wheelchair assistance leaving facility with spouse to private car for ride home. Pt had been having pain level of zero until he began to mobilize to wheelchair for discharge and at that time, this nurse gave him prn pain med and zofran for the 1 hour car ride home. See mar/pain levels.

## 2023-08-04 NOTE — PROGRESS NOTES
.ED OBSERVATION PROGRESS/DISCHARGE SUMMARY    Date of Admission: 8/2/2023   LOS: 0 days   PCP: Nasra Burton APRN      Subjective     Hospital Outcome:     63-year-old male was seen and examined at bedside following admission to the observation unit s/p aquablation for continuous bladder irrigation, pain management and antibiotic.  Patient complained of left flank pain that was radiating into left lower quadrant, CT abdomen pelvis was obtained and showed horseshoe kidney with left-sided hydronephrosis.  I spoke with Dr. Amaya with urology service and he has requested renal ultrasound which again confirms left-sided hydronephrosis.  Patient was medicated with narcotics however his pain persisted and complained of having flatus therefore simethicone given and patient reports that his abdominal and flank pain resolved.       Patient was seen by Dr. Garrett this morning, his creatinine improved to 1 from 1.78 yesterday.  Patient reports his pain is almost completely resolved.  His Kuhn catheter still noted to have [place] colored urinary output with some sediment still present.  He reports he is feeling much better today.  I discussed case with Dr. Garrett who is seen and this morning is cleared him for discharge home, will have patient follow-up in the office on Monday to have catheter removed and patient encouraged to ambulate.  MD recommends discharge home on oral antibiotic and analgesic therapy.    ROS:  General: no fevers, chills  Respiratory: no cough, dyspnea  Cardiovascular: no chest pain, palpitations  Abdomen: No abdominal pain, nausea, vomiting, or diarrhea  Neurologic: No focal weakness    Objective   Physical Exam:  I have reviewed the vital signs.  Temp:  [98.6 øF (37 øC)-99.5 øF (37.5 øC)] 99.5 øF (37.5 øC)  Heart Rate:  [74-93] 83  Resp:  [18-20] 18  BP: (118-135)/(72-86) 119/74  General Appearance:    Alert, cooperative, no distress, appears older than stated age  Head:    Normocephalic,  atraumatic  Eyes:    Sclerae anicteric  Neck:   Supple, no mass  Lungs: Clear to auscultation bilaterally, respirations unlabored  Heart: Regular rate and rhythm, S1 and S2 normal, no murmur, rub or gallop  Abdomen:  Soft, non-tender, bowel sounds active, nondistended  Genitourinary: Urinary catheter present with dark red-colored urinary output without gross clots present  Extremities: No clubbing, cyanosis, or edema to lower extremities  Pulses:  2+ and symmetric in distal lower extremities  Skin: No rashes   Neurologic: Oriented x3, Normal strength to extremities    Results Review:    I have reviewed the labs, radiology results and diagnostic studies.    Results from last 7 days   Lab Units 08/03/23  0637   WBC 10*3/mm3 13.72*   HEMOGLOBIN g/dL 10.4*   HEMATOCRIT % 30.1*   PLATELETS 10*3/mm3 195     Results from last 7 days   Lab Units 08/03/23  0637 08/02/23  1657   SODIUM mmol/L 138 143   POTASSIUM mmol/L 5.1 2.6*   CHLORIDE mmol/L 109* 118*   CO2 mmol/L 19.3* 18.0*   BUN mg/dL 23 14   CREATININE mg/dL 1.78* 0.65*   CALCIUM mg/dL 9.5 6.0*   BILIRUBIN mg/dL  --  0.6   ALK PHOS U/L  --  41   ALT (SGPT) U/L  --  13   AST (SGOT) U/L  --  14   GLUCOSE mg/dL 165* 160*     Imaging Results (Last 24 Hours)       ** No results found for the last 24 hours. **            I have reviewed the medications.  ---------------------------------------------------------------------------------------------  Assessment & Plan   Assessment/Problem List    BPH with obstruction/lower urinary tract symptoms        Plan:    BPH with obstruction/lower urinary tract symptoms  -Status post aquablation with Dr. Gordo Garrett, cleared for discharge home  -Kuhn catheter pulled, patient has voided on his own however he was retaining urine therefore Kuhn catheter was placed     ODLAYS  -Creatinine on admission 0.65, up to 1.78 today  -IV fluids  -Trend with a.m. labs     Left flank pain  -Resolved  -CT abdomen pelvis shows horseshoe kidney with  left-sided hydronephrosis.  Kuhn catheter in the bladder.  There is high attenuation material in the bladder consistent with hemorrhage and some inflammatory stranding the pelvis  -Urology consulted and has requested renal ultrasound which shows left-sided hydronephrosis however the exam was challenging due to bowel gas     Hypokalemia  -Resolved      GERD  -Continue PPI    Disposition: Home    Follow-up after Discharge: PCP and urology on Monday for Kuhn removal    This note will serve as a discharge summary      ZANE Trevino 08/04/23 01:06 EDT    I have worn appropriate PPE during this patient encounter, sanitized my hands both with entering and exiting patient's room.      41 minutes has been spent by [facility] providers in the care of this patient while under observation status

## 2023-08-04 NOTE — PROGRESS NOTES
"  First Urology Progress Note    Chief Complaint: No new complaints    Pain is improved had some pain earlier this morning has had couple doses of pain meds but improved since yesterday.  No nausea.  Hungry.  Labs noted with improvement of his creatinine.  White count still little elevated.  He failed his voiding trial yesterday but he was in quite a bit of pain and discomfort and he does have a fairly large bladder.  Kuhn replaced.  Gross hematuria but no clots and is draining well.  He has no bladder discomfort.    Review of Systems:    The following systems were reviewed and negative;  respiratory, cardiovascular, and gastrointestinal          Vital Signs  /85 (BP Location: Right arm, Patient Position: Lying)   Pulse 87   Temp 99.3 øF (37.4 øC) (Oral)   Resp 18   Ht 177.8 cm (70\")   Wt 83 kg (182 lb 15.7 oz)   SpO2 95%   BMI 26.26 kg/mý     Physical Exam:     General Appearance:    Alert, cooperative, NAD   HEENT:    No trauma, pupils reactive, hearing intact   Back:     No CVA tenderness   Lungs:     Respirations unlabored, no wheezing    Heart:    RRR, intact peripheral pulses   Abdomen:   Soft benign no bladder distention no flank tenderness.   :  Penis normal testes normal Kuhn catheter anchored meatus normal   Extremities:   No edema, no deformity   Lymphatic:   No neck or groin LAD   Skin:   No bleeding, bruising or rashes   Neuro/Psych:   Orientation intact, mood/affect pleasant, no focal findings        Results Review:     I reviewed the patient's new clinical results.  Lab Results (last 24 hours)       Procedure Component Value Units Date/Time    CBC (No Diff) [[id number]]  (Abnormal) Collected: 08/04/23 0418    Specimen: Blood Updated: 08/04/23 0514     WBC 13.87 10*3/mm3      RBC 2.77 10*6/mm3      Hemoglobin 8.9 g/dL      Hematocrit 26.2 %      MCV 94.6 fL      MCH 32.1 pg      MCHC 34.0 g/dL      RDW 12.8 %      RDW-SD 44.6 fl      MPV 10.9 fL      Platelets 144 10*3/mm3     Basic " "Metabolic Panel [[id number]]  (Abnormal) Collected: 08/04/23 0418    Specimen: Blood Updated: 08/04/23 0453     Glucose 108 mg/dL      BUN 14 mg/dL      Creatinine 1.06 mg/dL      Sodium 138 mmol/L      Potassium 4.4 mmol/L      Comment: Slight hemolysis detected by analyzer. Results may be affected.        Chloride 109 mmol/L      CO2 21.0 mmol/L      Calcium 7.9 mg/dL      BUN/Creatinine Ratio 13.2     Anion Gap 8.0 mmol/L      eGFR 77.9 mL/min/1.73     Narrative:      GFR Normal >60  Chronic Kidney Disease <60  Kidney Failure <15      Tissue Pathology Exam [[id number]] Collected: 08/02/23 1000    Specimen: Tissue from Prostate Updated: 08/03/23 1359     Case Report --     Surgical Pathology Report                         Case: [id number]                                  Authorizing Provider:  Nikita Garrett MD   Collected:           08/02/2023 10:00 AM          Ordering Location:     [facility]  Received:            08/02/2023 11:26 AM                                 MAIN OR                                                                      Pathologist:           Stacia Gillespie MD                                                    Specimen:    Prostate, PROSTATE CHIPS                                                                    Final Diagnosis --     1. Prostate, Transurethral Resection (20 Grams):   A. Fragments of benign prostate with changes consistent with nodular hyperplasia (BPH).   B. Fragment of metaplastic squamous mucosa.   C. Negative for malignancy.    ernie/carmela        Gross Description --     1. Prostate.  Received in formalin labeled \"prostate\" is a 20 gram, 6.5 x 4.0 x 3.5 cm aggregate of tan pink rubbery tissue fragments and clotted blood. The specimen is entirely submitted as 1A- 1I.     jap/uso/ernie/carmela               Imaging Results (Last 24 Hours)       ** No results found for the last 24 hours. **            Medication Review:   I have personally " reviewed    Current Facility-Administered Medications:     Calcium Replacement - Follow Nurse / BPA Driven Protocol, , Does not apply, PRN, Elin Mackey PA    ceFAZolin in dextrose (ANCEF) IVPB solution 2,000 mg, 2,000 mg, Intravenous, Q8H, Nikita Garrett MD, 2,000 mg at 08/04/23 0134    HYDROmorphone (DILAUDID) injection 1 mg, 1 mg, Intravenous, Q2H PRN, 1 mg at 08/04/23 0649 **AND** naloxone (NARCAN) injection 0.1 mg, 0.1 mg, Intravenous, Q5 Min PRN, Nikita Garrett MD    Magnesium Standard Dose Replacement - Follow Nurse / BPA Driven Protocol, , Does not apply, PRN, Elin Mackey PA    nitroglycerin (NITROSTAT) SL tablet 0.4 mg, 0.4 mg, Sublingual, Q5 Min PRN, Elin Mackey PA    ondansetron (ZOFRAN) tablet 4 mg, 4 mg, Oral, Q6H PRN **OR** ondansetron (ZOFRAN) injection 4 mg, 4 mg, Intravenous, Q6H PRN, Nikita Garrett MD, 4 mg at 08/02/23 2115    oxyCODONE-acetaminophen (PERCOCET) 7.5-325 MG per tablet 1 tablet, 1 tablet, Oral, Q4H PRN, Nikita Garrett MD, 1 tablet at 08/04/23 0558    pantoprazole (PROTONIX) EC tablet 40 mg, 40 mg, Oral, Q AM, Nikita Garrett MD, 40 mg at 08/03/23 0517    Phosphorus Replacement - Follow Nurse / BPA Driven Protocol, , Does not apply, PRN, Elin Mackey PA    Potassium Replacement - Follow Nurse / BPA Driven Protocol, , Does not apply, PRN, Elin Mackey PA    sennosides-docusate (PERICOLACE) 8.6-50 MG per tablet 2 tablet, 2 tablet, Oral, BID, Nikita Garrett MD, 2 tablet at 08/02/23 2000    simethicone (MYLICON) chewable tablet 80 mg, 80 mg, Oral, 4x Daily PRN, Elin Mackey PA, 80 mg at 08/03/23 0508    sodium chloride 0.9 % flush 10 mL, 10 mL, Intravenous, PRN, Nikita Garrett MD    sodium chloride 0.9 % flush 3 mL, 3 mL, Intravenous, Q12H, Nikita Garrett MD, 3 mL at 08/03/23 2056    sodium chloride 0.9 % infusion 40 mL, 40 mL, Intravenous, PRN, Nikita Garrett MD    sodium chloride 0.9 % infusion,  100 mL/hr, Intravenous, Continuous, Nikita Garrett MD, Last Rate: 100 mL/hr at 08/03/23 1555, 100 mL/hr at 08/03/23 1555    Allergies:    Patient has no known allergies.    Assessment:    Active Problems:    BPH with obstruction/lower urinary tract symptoms       Postop day 2 aqua ablation with hydronephrosis and now normal renal function.  Suspect his hydronephrosis is resolving.  Flank pain has improved.  Urinary retention likely secondary to poor pelvic floor relaxation and edema of his bladder neck.    Plan:    We will let him eat.  Hold on a stent given his pain improvement and resolved elevated creatinine.  Plan to likely let him go home today with his catheter and we can have him remove his catheter on Monday morning.  Home on antibiotics and pain medication.  His postop anemia is not concerning think it needs to be rechecked in the morning I think he can be discharged.      Nikita Garrett MD    8/4/2023  07:43 EDT   3

## 2023-08-04 NOTE — PLAN OF CARE
Goal Outcome Evaluation:  Plan of Care Reviewed With: patient        Progress: no change  Outcome Evaluation: Significant amount of blood/blood ting urine noted per coude catheter. 1800cc's total urine output noted this shift. No clots noted. Patient still c/o pain to lower abdomen. One po dose Oxycodone 7.5mg given for pain management. Pt NPO for possible stent placement this am. VSS

## 2023-08-07 ENCOUNTER — TRANSITIONAL CARE MANAGEMENT TELEPHONE ENCOUNTER (OUTPATIENT)
Dept: CALL CENTER | Facility: HOSPITAL | Age: 63
End: 2023-08-07
Payer: OTHER GOVERNMENT

## 2023-08-07 NOTE — OUTREACH NOTE
Call Center TCM Note      Flowsheet Row Responses   [facility] patient discharged from? [place]   Does the patient have one of the following disease processes/diagnoses(primary or secondary)? General Surgery   TCM attempt successful? Yes   Call start time 1308   Call end time 1311   Discharge diagnosis BPH with obstruction/lower urinary tract symptoms s/p AQUABLATION OF THE PROSTATE   Does the patient have all medications related to this admission filled (includes all antibiotics, pain medications, etc.) Yes   Is the patient taking all medications as directed (includes completed medication regime)? Yes   Comments f/u with surgeon on 9/5   Does the patient have an appointment with their PCP within 7-14 days of discharge? No   Nursing Interventions Patient desires to follow up with specialty only   Has home health visited the patient within 72 hours of discharge? N/A   Psychosocial issues? No   Did the patient receive a copy of their discharge instructions? Yes   Nursing interventions Reviewed instructions with patient   What is the patient's perception of their health status since discharge? Improving   Nursing interventions Nurse provided patient education   Is the patient /caregiver able to teach back basic post-op care? Lifting as instructed by MD in discharge instructions, No tub bath, swimming, or hot tub until instructed by MD   Is the patient/caregiver able to teach back signs and symptoms of incisional infection? Fever, Pus or odor from incision, Incisional warmth, Increased drainage or bleeding, Increased redness, swelling or pain at the incisonal site   Is the patient/caregiver able to teach back the hierarchy of who to call/visit for symptoms/problems? PCP, Specialist, Home health nurse, Urgent Care, ED, 911 Yes   TCM call completed? Yes   Wrap up additional comments Doing well, all questions addressed, he will be following up with his surgeon.   Call end time 1311   Would this patient benefit  from a Referral to [facility] Social Work? No   Is the patient interested in additional calls from an ambulatory ? No            Tawnya Rider RN    8/7/2023, 13:11 EDT

## 2023-08-07 NOTE — OUTREACH NOTE
Call Center TCM Note      Flowsheet Row Responses   [facility] patient discharged from? [place]   Does the patient have one of the following disease processes/diagnoses(primary or secondary)? General Surgery   TCM attempt successful? No   Unsuccessful attempts Attempt 1            Tawnya Rider RN    8/7/2023, 11:37 EDT

## 2023-08-18 ENCOUNTER — TELEPHONE (OUTPATIENT)
Dept: SURGERY | Facility: CLINIC | Age: 63
End: 2023-08-18
Payer: OTHER GOVERNMENT

## 2023-08-18 NOTE — TELEPHONE ENCOUNTER
PER DR TONY IF PATIENT IS HAVING SYMPTOMS FROM ANYTHING, OR ANY TYPE OF INFECTION HE WILL NEED TO BE EVALUATED BY HIS PCP AND RESCHEDULE HIS SURGERY. SPOKE WITH THE PATIENT AND HE STATES HE HAS NO SYMPTOMS OF AN INFECTION AND NO FEVER. HE STATES THAT HE JUST HAD PROSTATE SURGERY ON 8/2/23. PER DR TONY IT IS OK FOR PATIENT TO HAVE HIS SURGERY. PATIENT HAS BEEN NOTIFIED. LEFT DETAILED MESSAGE ON JOVITA'S VM.

## 2023-08-18 NOTE — PRE-PROCEDURE INSTRUCTIONS
PATIENT INSTRUCTED TO BE:    - NPO AFTER MIDNIGHT EXCEPT CAN HAVE CLEAR LIQUIDS 2 HOURS PRIOR TO SURGERY ARRIVAL TIME     - TO HOLD ALL VITAMINS, SUPPLEMENTS, NSAIDS FOR ONE WEEK PRIOR TO THEIR SURGICAL PROCEDURE    - INSTRUCTED PT TO USE SURGICAL SOAP 1 TIME THE NIGHT PRIOR TO SURGERY OR THE AM OF SURGERY.   USE SOAP FROM NECK TO TOES AVOID THEIR FACE, HAIR, AND PRIVATE PARTS. INSTRUCTED NO LOTIONS, JEWELRY, PIERCINGS, OR DEODORANT DAY OF SURGERY    - IF DIABETIC, CHECK BLOOD GLUCOSE IF LESS THAN 70 CALL PREOP AREA -AM OF SURGERY     - INSTRUCTED TO TAKE THE FOLLOWING MEDICATIONS THE DAY OF SURGERY:           LIPITOR, PRILOSEC, PERCOCET PRN     PATIENT VERBALIZED UNDERSTANDING

## 2023-08-18 NOTE — NURSING NOTE
CALLED LENA AT DR TONY OFFICE REGARDING ABNORMAL LABS (ELEVATED WBC, LOW HGB AND CALCIUM) ON LABS FROM 8-4-23. PT DENIED ANY RECENT INFECTION AT THIS TIME. SHE STATED SHE WILL LET SURGEON KNOW. NO FURTHER ORDERS NOTED AT THIS TIME

## 2023-08-18 NOTE — TELEPHONE ENCOUNTER
JOVITA CALLED FROM [facility].  SURGERY SCHEDULED 08/21/23.    LABS DONE 08/04/23.  ABNORMAL WHITE COUNT OF 13.87.  HAS IMPROVED SINCE FIRST OF AUGUST.  HEMOGLOBIN 8.9.  LOW CALCIUM 7.9.    IS DR. TONY OKAY WITH THIS?

## 2023-08-20 ENCOUNTER — ANESTHESIA EVENT (OUTPATIENT)
Dept: PERIOP | Facility: HOSPITAL | Age: 63
End: 2023-08-20
Payer: MEDICARE

## 2023-08-21 ENCOUNTER — ANESTHESIA (OUTPATIENT)
Dept: PERIOP | Facility: HOSPITAL | Age: 63
End: 2023-08-21
Payer: MEDICARE

## 2023-08-21 ENCOUNTER — HOSPITAL ENCOUNTER (OUTPATIENT)
Facility: HOSPITAL | Age: 63
Setting detail: HOSPITAL OUTPATIENT SURGERY
Discharge: HOME | End: 2023-08-21
Attending: SURGERY | Admitting: SURGERY
Payer: MEDICARE

## 2023-08-21 VITALS
OXYGEN SATURATION: 97 % | HEART RATE: 58 BPM | DIASTOLIC BLOOD PRESSURE: 62 MMHG | TEMPERATURE: 98.1 F | SYSTOLIC BLOOD PRESSURE: 108 MMHG | RESPIRATION RATE: 16 BRPM | WEIGHT: 169.09 LBS | BODY MASS INDEX: 25.04 KG/M2 | HEIGHT: 69 IN

## 2023-08-21 DIAGNOSIS — K40.20 BILATERAL INGUINAL HERNIA: ICD-10-CM

## 2023-08-21 DIAGNOSIS — K40.20 NON-RECURRENT BILATERAL INGUINAL HERNIA WITHOUT OBSTRUCTION OR GANGRENE: Primary | ICD-10-CM

## 2023-08-21 PROCEDURE — 25010000002 BUPIVACAINE (PF) 0.25 % SOLUTION 10 ML VIAL: Performed by: SURGERY

## 2023-08-21 PROCEDURE — 25010000002 PROPOFOL 10 MG/ML EMULSION: Performed by: NURSE ANESTHETIST, CERTIFIED REGISTERED

## 2023-08-21 PROCEDURE — 0 MEPERIDINE PER 100 MG: Performed by: NURSE ANESTHETIST, CERTIFIED REGISTERED

## 2023-08-21 PROCEDURE — C1781 MESH (IMPLANTABLE): HCPCS | Performed by: SURGERY

## 2023-08-21 PROCEDURE — 25010000002 DEXAMETHASONE PER 1 MG: Performed by: NURSE ANESTHETIST, CERTIFIED REGISTERED

## 2023-08-21 PROCEDURE — 25010000002 FENTANYL CITRATE (PF) 50 MCG/ML SOLUTION: Performed by: NURSE ANESTHETIST, CERTIFIED REGISTERED

## 2023-08-21 PROCEDURE — 25010000002 MIDAZOLAM PER 1MG: Performed by: ANESTHESIOLOGY

## 2023-08-21 PROCEDURE — 25010000002 HYDROMORPHONE 1 MG/ML SOLUTION: Performed by: NURSE ANESTHETIST, CERTIFIED REGISTERED

## 2023-08-21 PROCEDURE — S2900 ROBOTIC SURGICAL SYSTEM: HCPCS | Performed by: SURGERY

## 2023-08-21 PROCEDURE — 25010000002 ONDANSETRON PER 1 MG: Performed by: NURSE ANESTHETIST, CERTIFIED REGISTERED

## 2023-08-21 PROCEDURE — 25010000002 CEFAZOLIN IN DEXTROSE 2000 MG/ 100 ML SOLUTION: Performed by: SURGERY

## 2023-08-21 PROCEDURE — 49650 LAP ING HERNIA REPAIR INIT: CPT | Performed by: SURGERY

## 2023-08-21 PROCEDURE — 25010000002 SUGAMMADEX 200 MG/2ML SOLUTION: Performed by: NURSE ANESTHETIST, CERTIFIED REGISTERED

## 2023-08-21 DEVICE — PHASIX MESH, 6" X 8" (15.2 CM X 20.3 CM), RECTANGLE
Type: IMPLANTABLE DEVICE | Site: ABDOMEN | Status: FUNCTIONAL
Brand: PHASIX

## 2023-08-21 DEVICE — ABSORBABLE WOUND CLOSURE DEVICE
Type: IMPLANTABLE DEVICE | Site: ABDOMEN | Status: FUNCTIONAL
Brand: V-LOC 90

## 2023-08-21 RX ORDER — MEPERIDINE HYDROCHLORIDE 25 MG/ML
12.5 INJECTION INTRAMUSCULAR; INTRAVENOUS; SUBCUTANEOUS
Status: DISCONTINUED | OUTPATIENT
Start: 2023-08-21 | End: 2023-08-21 | Stop reason: HOSPADM

## 2023-08-21 RX ORDER — SODIUM CHLORIDE, SODIUM LACTATE, POTASSIUM CHLORIDE, CALCIUM CHLORIDE 600; 310; 30; 20 MG/100ML; MG/100ML; MG/100ML; MG/100ML
100 INJECTION, SOLUTION INTRAVENOUS CONTINUOUS
Status: DISCONTINUED | OUTPATIENT
Start: 2023-08-21 | End: 2023-08-21 | Stop reason: HOSPADM

## 2023-08-21 RX ORDER — ONDANSETRON 2 MG/ML
4 INJECTION INTRAMUSCULAR; INTRAVENOUS ONCE AS NEEDED
Status: DISCONTINUED | OUTPATIENT
Start: 2023-08-21 | End: 2023-08-21 | Stop reason: HOSPADM

## 2023-08-21 RX ORDER — MAGNESIUM HYDROXIDE 1200 MG/15ML
LIQUID ORAL AS NEEDED
Status: DISCONTINUED | OUTPATIENT
Start: 2023-08-21 | End: 2023-08-21 | Stop reason: HOSPADM

## 2023-08-21 RX ORDER — FENTANYL CITRATE 50 UG/ML
INJECTION, SOLUTION INTRAMUSCULAR; INTRAVENOUS AS NEEDED
Status: DISCONTINUED | OUTPATIENT
Start: 2023-08-21 | End: 2023-08-21 | Stop reason: SURG

## 2023-08-21 RX ORDER — OXYCODONE HCL 5 MG/5 ML
5 SOLUTION, ORAL ORAL EVERY 4 HOURS PRN
Status: DISCONTINUED | OUTPATIENT
Start: 2023-08-21 | End: 2023-08-21 | Stop reason: HOSPADM

## 2023-08-21 RX ORDER — OMEGA-3S/DHA/EPA/FISH OIL/D3 300MG-1000
400 CAPSULE ORAL DAILY
COMMUNITY

## 2023-08-21 RX ORDER — DOCUSATE SODIUM 100 MG/1
100 CAPSULE, LIQUID FILLED ORAL 2 TIMES DAILY PRN
Qty: 10 CAPSULE | Refills: 1 | Status: SHIPPED | OUTPATIENT
Start: 2023-08-21 | End: 2024-08-20

## 2023-08-21 RX ORDER — CEFAZOLIN SODIUM 2 G/100ML
2000 INJECTION, SOLUTION INTRAVENOUS ONCE
Status: COMPLETED | OUTPATIENT
Start: 2023-08-21 | End: 2023-08-21

## 2023-08-21 RX ORDER — SODIUM CHLORIDE 0.9 % (FLUSH) 0.9 %
10 SYRINGE (ML) INJECTION AS NEEDED
Status: DISCONTINUED | OUTPATIENT
Start: 2023-08-21 | End: 2023-08-21 | Stop reason: HOSPADM

## 2023-08-21 RX ORDER — MIDAZOLAM HYDROCHLORIDE 2 MG/2ML
2 INJECTION, SOLUTION INTRAMUSCULAR; INTRAVENOUS ONCE
Status: COMPLETED | OUTPATIENT
Start: 2023-08-21 | End: 2023-08-21

## 2023-08-21 RX ORDER — SODIUM CHLORIDE 9 MG/ML
40 INJECTION, SOLUTION INTRAVENOUS AS NEEDED
Status: DISCONTINUED | OUTPATIENT
Start: 2023-08-21 | End: 2023-08-21 | Stop reason: HOSPADM

## 2023-08-21 RX ORDER — LIDOCAINE HYDROCHLORIDE 20 MG/ML
INJECTION, SOLUTION EPIDURAL; INFILTRATION; INTRACAUDAL; PERINEURAL AS NEEDED
Status: DISCONTINUED | OUTPATIENT
Start: 2023-08-21 | End: 2023-08-21 | Stop reason: SURG

## 2023-08-21 RX ORDER — ONDANSETRON 2 MG/ML
INJECTION INTRAMUSCULAR; INTRAVENOUS AS NEEDED
Status: DISCONTINUED | OUTPATIENT
Start: 2023-08-21 | End: 2023-08-21 | Stop reason: SURG

## 2023-08-21 RX ORDER — HYDROCODONE BITARTRATE AND ACETAMINOPHEN 5; 325 MG/1; MG/1
1 TABLET ORAL EVERY 4 HOURS PRN
Qty: 20 TABLET | Refills: 0 | Status: SHIPPED | OUTPATIENT
Start: 2023-08-21

## 2023-08-21 RX ORDER — PROMETHAZINE HYDROCHLORIDE 25 MG/1
25 SUPPOSITORY RECTAL ONCE AS NEEDED
Status: DISCONTINUED | OUTPATIENT
Start: 2023-08-21 | End: 2023-08-21 | Stop reason: HOSPADM

## 2023-08-21 RX ORDER — PROMETHAZINE HYDROCHLORIDE 12.5 MG/1
25 TABLET ORAL ONCE AS NEEDED
Status: DISCONTINUED | OUTPATIENT
Start: 2023-08-21 | End: 2023-08-21 | Stop reason: HOSPADM

## 2023-08-21 RX ORDER — ROCURONIUM BROMIDE 10 MG/ML
INJECTION, SOLUTION INTRAVENOUS AS NEEDED
Status: DISCONTINUED | OUTPATIENT
Start: 2023-08-21 | End: 2023-08-21 | Stop reason: SURG

## 2023-08-21 RX ORDER — SODIUM CHLORIDE, SODIUM LACTATE, POTASSIUM CHLORIDE, CALCIUM CHLORIDE 600; 310; 30; 20 MG/100ML; MG/100ML; MG/100ML; MG/100ML
9 INJECTION, SOLUTION INTRAVENOUS CONTINUOUS PRN
Status: DISCONTINUED | OUTPATIENT
Start: 2023-08-21 | End: 2023-08-21 | Stop reason: HOSPADM

## 2023-08-21 RX ORDER — DEXAMETHASONE SODIUM PHOSPHATE 4 MG/ML
INJECTION, SOLUTION INTRA-ARTICULAR; INTRALESIONAL; INTRAMUSCULAR; INTRAVENOUS; SOFT TISSUE AS NEEDED
Status: DISCONTINUED | OUTPATIENT
Start: 2023-08-21 | End: 2023-08-21 | Stop reason: SURG

## 2023-08-21 RX ORDER — DEXMEDETOMIDINE HYDROCHLORIDE 100 UG/ML
INJECTION, SOLUTION INTRAVENOUS AS NEEDED
Status: DISCONTINUED | OUTPATIENT
Start: 2023-08-21 | End: 2023-08-21 | Stop reason: SURG

## 2023-08-21 RX ORDER — HYDROCODONE BITARTRATE AND ACETAMINOPHEN 5; 325 MG/1; MG/1
1 TABLET ORAL ONCE AS NEEDED
Status: DISCONTINUED | OUTPATIENT
Start: 2023-08-21 | End: 2023-08-21 | Stop reason: HOSPADM

## 2023-08-21 RX ORDER — PROPOFOL 10 MG/ML
VIAL (ML) INTRAVENOUS AS NEEDED
Status: DISCONTINUED | OUTPATIENT
Start: 2023-08-21 | End: 2023-08-21 | Stop reason: SURG

## 2023-08-21 RX ORDER — ACETAMINOPHEN 500 MG
1000 TABLET ORAL ONCE
Status: COMPLETED | OUTPATIENT
Start: 2023-08-21 | End: 2023-08-21

## 2023-08-21 RX ORDER — SODIUM CHLORIDE 0.9 % (FLUSH) 0.9 %
10 SYRINGE (ML) INJECTION EVERY 12 HOURS SCHEDULED
Status: DISCONTINUED | OUTPATIENT
Start: 2023-08-21 | End: 2023-08-21 | Stop reason: HOSPADM

## 2023-08-21 RX ADMIN — HYDROMORPHONE HYDROCHLORIDE 0.5 MG: 1 INJECTION, SOLUTION INTRAMUSCULAR; INTRAVENOUS; SUBCUTANEOUS at 14:24

## 2023-08-21 RX ADMIN — SUGAMMADEX 200 MG: 100 INJECTION, SOLUTION INTRAVENOUS at 14:04

## 2023-08-21 RX ADMIN — PROPOFOL 150 MG: 10 INJECTION, EMULSION INTRAVENOUS at 12:17

## 2023-08-21 RX ADMIN — SODIUM CHLORIDE, POTASSIUM CHLORIDE, SODIUM LACTATE AND CALCIUM CHLORIDE: 600; 310; 30; 20 INJECTION, SOLUTION INTRAVENOUS at 12:09

## 2023-08-21 RX ADMIN — DEXAMETHASONE SODIUM PHOSPHATE 4 MG: 4 INJECTION, SOLUTION INTRAMUSCULAR; INTRAVENOUS at 12:35

## 2023-08-21 RX ADMIN — ONDANSETRON 4 MG: 2 INJECTION INTRAMUSCULAR; INTRAVENOUS at 12:35

## 2023-08-21 RX ADMIN — DEXMEDETOMIDINE 10 MCG: 100 INJECTION, SOLUTION INTRAVENOUS at 13:55

## 2023-08-21 RX ADMIN — ROCURONIUM BROMIDE 50 MG: 50 INJECTION INTRAVENOUS at 12:17

## 2023-08-21 RX ADMIN — DEXMEDETOMIDINE 10 MCG: 100 INJECTION, SOLUTION INTRAVENOUS at 14:00

## 2023-08-21 RX ADMIN — SODIUM CHLORIDE, POTASSIUM CHLORIDE, SODIUM LACTATE AND CALCIUM CHLORIDE: 600; 310; 30; 20 INJECTION, SOLUTION INTRAVENOUS at 14:00

## 2023-08-21 RX ADMIN — DEXMEDETOMIDINE 10 MCG: 100 INJECTION, SOLUTION INTRAVENOUS at 13:50

## 2023-08-21 RX ADMIN — ROCURONIUM BROMIDE 20 MG: 50 INJECTION INTRAVENOUS at 12:58

## 2023-08-21 RX ADMIN — MIDAZOLAM HYDROCHLORIDE 2 MG: 1 INJECTION, SOLUTION INTRAMUSCULAR; INTRAVENOUS at 11:56

## 2023-08-21 RX ADMIN — LIDOCAINE HYDROCHLORIDE 80 MG: 20 INJECTION, SOLUTION EPIDURAL; INFILTRATION; INTRACAUDAL; PERINEURAL at 12:17

## 2023-08-21 RX ADMIN — SODIUM CHLORIDE, POTASSIUM CHLORIDE, SODIUM LACTATE AND CALCIUM CHLORIDE 9 ML/HR: 600; 310; 30; 20 INJECTION, SOLUTION INTRAVENOUS at 11:07

## 2023-08-21 RX ADMIN — HYDROMORPHONE HYDROCHLORIDE 0.5 MG: 1 INJECTION, SOLUTION INTRAMUSCULAR; INTRAVENOUS; SUBCUTANEOUS at 14:42

## 2023-08-21 RX ADMIN — OXYCODONE HYDROCHLORIDE 5 MG: 5 SOLUTION ORAL at 15:02

## 2023-08-21 RX ADMIN — CEFAZOLIN SODIUM 2000 MG: 2 INJECTION, SOLUTION INTRAVENOUS at 12:11

## 2023-08-21 RX ADMIN — FENTANYL CITRATE 100 MCG: 50 INJECTION, SOLUTION INTRAMUSCULAR; INTRAVENOUS at 12:17

## 2023-08-21 RX ADMIN — DEXMEDETOMIDINE 10 MCG: 100 INJECTION, SOLUTION INTRAVENOUS at 13:45

## 2023-08-21 RX ADMIN — MEPERIDINE HYDROCHLORIDE 12.5 MG: 25 INJECTION INTRAMUSCULAR; INTRAVENOUS; SUBCUTANEOUS at 14:24

## 2023-08-21 RX ADMIN — ACETAMINOPHEN 1000 MG: 500 TABLET ORAL at 11:07

## 2023-08-21 NOTE — ANESTHESIA PREPROCEDURE EVALUATION
Anesthesia Evaluation     Patient summary reviewed and Nursing notes reviewed   no history of anesthetic complications:   NPO Solid Status: > 8 hours  NPO Liquid Status: > 2 hours           Airway   Mallampati: II  TM distance: >3 FB  Neck ROM: full  No difficulty expected  Dental    (+) implants    Pulmonary - negative pulmonary ROS and normal exam    breath sounds clear to auscultation  Cardiovascular - negative cardio ROS and normal exam  Exercise tolerance: good (4-7 METS)    Rhythm: regular  Rate: normal        Neuro/Psych- negative ROS  GI/Hepatic/Renal/Endo    (+) GERD well controlled    Musculoskeletal (-) negative ROS    Abdominal    Substance History - negative use     OB/GYN negative ob/gyn ROS         Other - negative ROS       ROS/Med Hx Other: PAT Nursing Notes unavailable.                 Anesthesia Plan    ASA 3     general     (Patient understands anesthesia not responsible for dental damage.)  intravenous induction     Anesthetic plan, risks, benefits, and alternatives have been provided, discussed and informed consent has been obtained with: patient.    Use of blood products discussed with patient .    Plan discussed with CRNA.    CODE STATUS:

## 2023-08-21 NOTE — H&P
History of Present Illness  Andrew Miranda is a 63 y.o. male presents to [facility] GENERAL SURGERY. The patient is to be seen for inguinal hernias.     The patient is a 63-year-old male who presents today for left inguinal hernia.      The patient reports that he has a hernia, 2. He states that he was seen by Dayana Burton. He notes this has been going on for about 8 months. He states that he thinks it is getting bigger. The patient reports that he used to be able to push it back in easily. He states that it is getting harder to push it back in. He notes that it causes some pain when a lot comes out. The patient states that half of the time if he lays down it will go back in, the other half he must push it back in. He notes that it never goes back in while he is sitting. The patient reports that hernia on the right does not come out as much as the 1 on the left, however; there is a constant hot, tearing feeling. He states that he has had hernia repair over 20 years ago. He reports that he is not sure what kind of hernia he had or when he had it repaired. He is unsure if mesh was placed during the repair. The patient reports that the is scheduled for an aqua ablation on 08/02/2023.               Objective         Medical History        Past Medical History:   Diagnosis Date    Diverticulosis 2020    Elevated PSA 9/1/2022    GERD (gastroesophageal reflux disease)      Hernia cerebri      Hyperlipidemia      Insomnia      Kidney stone      Low back pain      Thoracic disc disorder              Surgical History         Past Surgical History:   Procedure Laterality Date    COLONOSCOPY   07/20/2017     [facility]- Repeat in 10 years.     EPIDURAL BLOCK        FRACTURE SURGERY   1970     Broke shin    LITHOTRIPSY        UMBILICAL HERNIA REPAIR        VASECTOMY        WISDOM TOOTH EXTRACTION                   Current Outpatient Medications:     atorvastatin (Lipitor) 80 MG tablet, Take  "1 tablet by mouth Daily., Disp: 90 tablet, Rfl: 1    celecoxib (CeleBREX) 200 MG capsule, Take 1 capsule by mouth Daily., Disp: 90 capsule, Rfl: 1    omeprazole (priLOSEC) 20 MG capsule, Take 1 capsule by mouth Daily., Disp: 90 capsule, Rfl: 1    tamsulosin (FLOMAX) 0.4 MG capsule 24 hr capsule, Take 1 capsule by mouth Daily., Disp: 90 capsule, Rfl: 1    traZODone (DESYREL) 50 MG tablet, Take 1 tablet by mouth Every Night., Disp: 90 tablet, Rfl: 1    oxyCODONE-acetaminophen (Percocet) 5-325 MG per tablet, Take 1 tablet by mouth Every 6 (Six) Hours As Needed for Severe Pain ., Disp: 12 tablet, Rfl: 0     No Known Allergies            Family History   Problem Relation Age of Onset    Breast cancer Other      Stroke Other      Cancer Mother      Cancer Father           Social History   Social History            Socioeconomic History    Marital status:    Tobacco Use    Smoking status: Never    Smokeless tobacco: Never   Vaping Use    Vaping Use: Never used   Substance and Sexual Activity    Alcohol use: Yes       Alcohol/week: 7.0 standard drinks       Types: 7 Shots of liquor per week    Drug use: Never    Sexual activity: Yes       Partners: Female       Birth control/protection: Surgical, None       Comment: Vasectomy            Vital Signs:   Resp 16   Ht 177.8 cm (70\")   Wt 83.9 kg (185 lb)   BMI 26.54 kg/mý      Physical Exam   No acute distress, nonlabored breathing. Abdomen is soft. He does seem to have a reducible inguinal hernia on the left. He has well healed scars on the left and the right from previous hernia repairs. He also has a periumbilical hernia repair scar but I do not feel a recurrence there.           Result Review    :               Procedures            Assessment      Assessment and Plan    There are no diagnoses linked to this encounter.        Assessment:  Patient with a recurrent left inguinal hernia, possible recurrent right.     Plan:   We will plan for robotic bilateral " inguinal hernia repair in the future. Risks, benefits, and alternatives of the procedure were discussed extensively. All questions were answered. Patient voiced understanding, agreed to proceed with the above plan. Of note, the patient states that he wants a piece of phasic's resorbable mesh for his hernia repairs.           Follow Up   No follow-ups on file.  Patient was given instructions and counseling regarding his condition or for health maintenance advice. Please see specific information pulled into the AVS if appropriate.         Transcribed from ambient dictation for Prasanna Reid MD by Yoanna Elizondo.  07/11/23   15:28 EDT     Patient or patient representative verbalized consent to the visit recording.

## 2023-08-21 NOTE — OP NOTE
Preoperative diagnosis: Left inguinal hernia    Postoperative diagnosis: Bilateral inguinal hernias    Procedure: Robotic bilateral inguinal hernia repair    Surgeon: Franklin    Anesthesia: General    Assistant: Narcisa Amador    EBL: 21 mL    Specimens: None    Complications: None    Indications: Patient with history of hernia repairs presents with enlarging and painful bulge in the left groin.  Also having some burning pain in the right groin.  Presents today for elective hernia repair.    PROCEDURE    Patient was seen in the preoperative holding area.  Risks, benefits, alternatives of the operation were again discussed in detail.  All of the patient and family's questions were answered.  They voiced understanding, and agreed to proceed.    Patient was brought to the operating room.  Monitoring devices and Vaughn stockings were placed.  Anesthesia was administered.  Patient was prepped and draped in standard surgical fashion.  After prepping and draping a timeout was performed to verify both the correct patient and correct procedure.    We began by injecting local anesthesia in the left upper quadrant.  An incision to accommodate a 12 mm trocar was made in the left upper quadrant, and the Veress needle was inserted into the abdomen.  Intra-abdominal placement was verified with a normal saline drop test.  After verification, the abdomen was insufflated to 15 mmHg pressure.  Once the abdomen was insufflated, we removed the Veress needle.  Then using the Visiport technique the abdomen was entered in the left upper quadrant.  Once the abdomen was entered, we reinserted the laparoscope and looked underneath our Veress needle and Visiport entry sites, making sure there was no underlying injury.  We then went about placing our subsequent trocars.  After injection of local anesthesia and under direct visualization, an 8 mm trocar was placed in the left and right mid abdomen and just above the umbilicus.  He did have some  adhesions to some umbilical hernia mesh from his previous umbilical hernia repair.  This hernia repair appeared to be intact with no obvious recurrence.  I did take down some these adhesions in order to place the 8 mm trocar.    We then brought in and docked the robot.  We inspected his groins bilaterally.  He had a large obvious left inguinal hernia.  He did also have a small right inguinal hernia.  Once the robot was in appropriate position, we identified the anterior superior iliac spine on the left and made an incision into the peritoneum at this level.  We sharply entered the preperitoneal space.  We allowed CO2 insufflation to aid in our dissection.  We dissected into the preperitoneal space with a combination of blunt dissection and electrocautery.  I dissected medially until I found the pubic tubercle.  We dissected about 2 cm below the level the pubic tubercle.  We then dissected medially to laterally.  During this dissection we reduced any contents in both the direct and indirect spaces.  Once the hernia sac was completely reduced and all contents were free of the direct and indirect space we finished our dissection out laterally to make space for the mesh.  We inspected our operative field and made sure everything appeared to be hemostatic.  We did not appear to have any underlying injury.  Placed a 10 x 15 piece of Phasix mesh into the preperitoneal space.  It was sewn into place with interrupted 3-0 Vicryl sutures.  We made sure it covered past the midline and down below the pubic tubercle and our previous dissection field, we also made sure that it covered both the direct and indirect spaces completely with all reduced contents outside of the mesh.  Once we felt that the mesh was in appropriate position we closed the peritoneum over top of the mesh with a running 3 0 V-Loc suture.  We again inspected our operative field and made sure everything appeared to be hemostatic with no obvious underlying  injury.    We then identified the anterior superior iliac spine on the right and made an incision into the peritoneum at this level.  We sharply entered the preperitoneal space.  We allowed CO2 insufflation to aid in our dissection.  We dissected into the preperitoneal space with a combination of blunt dissection and electrocautery.  I dissected medially until I found the pubic tubercle.  We dissected about 2 cm below the level the pubic tubercle.  We then dissected medially to laterally.  During this dissection we reduced any contents in both the direct and indirect spaces.  Once the hernia sac was completely reduced and all contents were free of the direct and indirect space we finished our dissection out laterally to make space for the mesh.  We inspected our operative field and made sure everything appeared to be hemostatic.  We did not appear to have any underlying injury.  Placed a 8 x 10 piece of ProGrip mesh into the preperitoneal space.  We made sure it covered past the midline and down below the pubic tubercle and our previous dissection field, we also made sure that it covered both the direct and indirect spaces completely with all reduced contents outside of the mesh.  We secured the mesh into place with interrupted 3-0 Vicryl sutures.  Once we felt that the mesh was in appropriate position we closed the peritoneum over top of the mesh with a running 3 0 V-Loc suture.  We again inspected our operative field and made sure everything appeared to be hemostatic with no obvious underlying injury.    We then removed the 12 mm trocar, and that site was closed with a Wili-No device and 0 Vicryl suture under direct visualization.  The remaining trocars were then removed under direct visualization, and the abdomen was desufflated.  Skin incisions were closed with subcuticular 4-0 Vicryl stitch and dressed with skin glue.    Assistant: Narcisa Amador CSA was responsible for performing the following  activities: Suturing, Closing, Placing Dressing, and Held/Positioned Camera and their skilled assistance was necessary for the success of this case.      The patient was then aroused from anesthesia, taken off the OR table, and taken to PACU in stable condition.  Sponge, needle, and instrument counts were correct x2.  Narcisa Amador was present for the entire procedure and helped in all portions of the case.    The operative note was dictated with the help of the dragon dictation system.

## 2023-08-21 NOTE — ANESTHESIA POSTPROCEDURE EVALUATION
Patient: Andrew Miranda    Procedure Summary       Date: 08/21/23 Room / Location: [facility] OR  / [facility] MAIN OR    Anesthesia Start: 1209 Anesthesia Stop: 1419    Procedure: INGUINAL HERNIA REPAIR LAPAROSCOPIC WITH DAVINCI ROBOT (Bilateral: Abdomen) Diagnosis:       Bilateral inguinal hernia      (Bilateral inguinal hernia [K40.20])    Surgeons: Prasanna Reid MD Provider: Jasvir Mcdonald MD    Anesthesia Type: general ASA Status: 3            Anesthesia Type: general    Vitals  Vitals Value Taken Time   BP 94/64 08/21/23 1503   Temp 36.2 øC (97.2 øF) 08/21/23 1420   Pulse 64 08/21/23 1503   Resp 16 08/21/23 1500   SpO2 98 % 08/21/23 1503   Vitals shown include unvalidated device data.        Post Anesthesia Care and Evaluation    Patient location during evaluation: bedside  Patient participation: complete - patient participated  Level of consciousness: awake  Pain score: 2  Pain management: adequate    Airway patency: patent  PONV Status: none  Cardiovascular status: acceptable and stable  Respiratory status: acceptable  Hydration status: acceptable    Comments: An Anesthesiologist personally participated in the most demanding procedures (including induction and emergence if applicable) in the anesthesia plan, monitored the course of anesthesia administration at frequent intervals and remained physically present and available for immediate diagnosis and treatment of emergencies.

## 2023-08-21 NOTE — DISCHARGE INSTRUCTIONS
DISCHARGE INSTRUCTIONS  HERNIA      For your surgery you had:  General anesthesia (you may have a sore throat for the first 24 hours)  You may experience dizziness, drowsiness, or light-headedness for several hours following surgery/procedure.  Do not stay alone today or tonight.  Limit your activity for 24 hours.  Resume your diet slowly.  Follow whatever special dietary instructions you may have been given by your doctor.  You should not drive, operate machinery, drink alcohol, or sign legally binding documents for 24 hours or while you are taking pain medication.    NOTIFY YOUR DOCTOR IF YOU EXPERIENCE ANY OF THE FOLLOWING:  Temperature greater than 101 degrees Fahrenheit  Shaking Chills  Redness or excessive drainage from incision  Nausea, vomiting and/or pain that is not controlled by prescribed medications  Increase in bleeding or bleeding that is excessive  Unable to urinate in 6 hours after surgery  If unable to reach your doctor, please go to the closest Emergency Room [x] Skin glue will flake off in 10-14 days.  [x] You may shower tomorrow, do not submerge incision x2 weeks.  Apply an ice pack for 24-48 hours.  [x] Wear a jockey support or tight fitting briefs to prevent swelling.  Do not do any heavy lifting, pushing or pulling.  You may walk up and down stairs.  You may ride in a car but do not drive until instructed by your physician.  Avoid constipation.  If unable to urinate in 6 to 8 hours after surgery or urinating frequently in small amounts, notify your doctor or go to the nearest Emergency Room.  Medications per physician instructions as indicated on Discharge Medication Information Sheet.      SPECIAL INSTRUCTIONS:  No lifting    Last dose of pain medication was given at:   Tylenol (1000mg) last at 11am. Do not exceed 4000mg of tylenol in a 24 hour period.

## 2023-09-05 ENCOUNTER — OFFICE VISIT (OUTPATIENT)
Dept: SURGERY | Facility: CLINIC | Age: 63
End: 2023-09-05
Payer: MEDICARE

## 2023-09-05 VITALS — BODY MASS INDEX: 26.1 KG/M2 | RESPIRATION RATE: 16 BRPM | WEIGHT: 176.2 LBS | HEIGHT: 69 IN

## 2023-09-05 DIAGNOSIS — K40.20 NON-RECURRENT BILATERAL INGUINAL HERNIA WITHOUT OBSTRUCTION OR GANGRENE: Primary | ICD-10-CM

## 2023-09-05 PROCEDURE — 1159F MED LIST DOCD IN RCRD: CPT | Performed by: SURGERY

## 2023-09-05 PROCEDURE — 99024 POSTOP FOLLOW-UP VISIT: CPT | Performed by: SURGERY

## 2023-09-05 PROCEDURE — 1160F RVW MEDS BY RX/DR IN RCRD: CPT | Performed by: SURGERY

## 2023-09-05 NOTE — PROGRESS NOTES
Chief Complaint: Post-op    Subjective         History of Present Illness  Andrew Miranda is a 63 y.o. male presents to [facility] GENERAL SURGERY. The patient is to be seen for follow-up after robotic bilateral inguinal hernia repair.    Status post robotic bilateral inguinal hernia repair  The patient reports he is doing well. He states he is getting back to normal little by little. The patient reports he is eating and drinking normally. He denies any issues with urination or bowel movements. The patient reports he did not have a lot of soreness, but not what he expected. He states the second night he was home, he had terrible gas pains, but other than that, he has not had anything unbearable. The patient reports he has not taken aspirin. He denies any bulge in his groins. He states he is not so concerned with lifting, but he is more concerned with stretching.    Objective     Past Medical History:   Diagnosis Date    BPH (benign prostatic hyperplasia)     RESOLVED    BPH with obstruction/lower urinary tract symptoms 08/02/2023    Chronic lumbar pain     SEES' PAIN MANAGEMENT    Elevated PSA 9/1/2022    Frequent urination     URGENCY    GERD (gastroesophageal reflux disease)     History of kidney stones     Hyperlipidemia     Insomnia     Left inguinal hernia        Past Surgical History:   Procedure Laterality Date    COLONOSCOPY  07/20/2017    [facility]- Repeat in 10 years.     EPIDURAL BLOCK      FRACTURE SURGERY  1970    Broke shin    INGUINAL HERNIA REPAIR Bilateral 8/21/2023    Procedure: INGUINAL HERNIA REPAIR LAPAROSCOPIC WITH DAVINCI ROBOT;  Surgeon: Prasanna Reid MD;  Location: [facility];  Service: Robotics - DaVinci;  Laterality: Bilateral;    LITHOTRIPSY      PROSTATE AQUABLATION N/A 08/02/2023    Procedure: AQUABLATION OF THE PROSTATE;  Surgeon: Nikita Garrett MD;  Location: [facility] OR;  Service: Robotics - Urology;  Laterality: N/A;     UMBILICAL HERNIA REPAIR      VASECTOMY           Current Outpatient Medications:     atorvastatin (Lipitor) 80 MG tablet, Take 1 tablet by mouth Daily., Disp: 90 tablet, Rfl: 1    celecoxib (CeleBREX) 200 MG capsule, Take 1 capsule by mouth Daily., Disp: 90 capsule, Rfl: 1    cholecalciferol (VITAMIN D3) 10 MCG (400 UNIT) tablet, Take 1 tablet by mouth Daily., Disp: , Rfl:     omeprazole (priLOSEC) 20 MG capsule, Take 1 capsule by mouth Daily., Disp: 90 capsule, Rfl: 1    traZODone (DESYREL) 50 MG tablet, Take 1 tablet by mouth Every Night., Disp: 90 tablet, Rfl: 1    docusate sodium (Colace) 100 MG capsule, Take 1 capsule by mouth 2 (Two) Times a Day As Needed for Constipation., Disp: 10 capsule, Rfl: 1    docusate sodium (Colace) 100 MG capsule, Take 1 capsule by mouth 2 (Two) Times a Day As Needed for Constipation., Disp: 10 capsule, Rfl: 1    HYDROcodone-acetaminophen (NORCO) 5-325 MG per tablet, Take 1 tablet by mouth Every 4 (Four) Hours As Needed (Pain)., Disp: 20 tablet, Rfl: 0    HYDROcodone-acetaminophen (Norco) 5-325 MG per tablet, Take 1 tablet by mouth Every 4 (Four) Hours As Needed for Moderate Pain., Disp: 20 tablet, Rfl: 0    oxyCODONE-acetaminophen (PERCOCET) 5-325 MG per tablet, Take 1 tablet by mouth Every 4 (Four) Hours As Needed for Moderate Pain., Disp: 10 tablet, Rfl: 0    No Known Allergies     Family History   Problem Relation Age of Onset    Cancer Mother     Cancer Father     Breast cancer Other     Stroke Other     Malig Hyperthermia Neg Hx        Social History     Socioeconomic History    Marital status:    Tobacco Use    Smoking status: Never    Smokeless tobacco: Never   Vaping Use    Vaping Use: Never used   Substance and Sexual Activity    Alcohol use: Not Currently     Alcohol/week: 7.0 standard drinks     Types: 7 Shots of liquor per week     Comment: DAILY:  VODKA 1-2 NIGHTLY    Drug use: Never    Sexual activity: Defer     Partners: Female     Birth control/protection:  Surgical, None     Comment: Vasectomy        Physical Exam   Post Surgical Incision  Surgical wound:  Incision is healing well. No signs of infection. No signs of recurrence of the hernia even with Valsalva.    No acute distress. Nonlabored breathing. Abdomen is soft.    Result Review :               Assessment and Plan    There are no diagnoses linked to this encounter.  1. Patient is status post robotic bilateral inguinal hernia repair.  The patient is doing well, healing expected and pleased with his overall progress. At this point in time, he can follow up with me as needed and call with any questions or concerns. He can resume his lifting in 2 weeks. Discussed with the patient. All questions were answered. He voiced understanding and agreed to proceed with the above plan.      Follow Up   No follow-ups on file.  Patient was given instructions and counseling regarding his condition or for health maintenance advice. Please see specific information pulled into the AVS if appropriate.       Transcribed from ambient dictation for Prasanna Reid MD by Roula Morales.  09/05/23   16:35 EDT    Patient or patient representative verbalized consent to the visit recording.  I have personally performed the services described in this document as transcribed by the above individual, and it is both accurate and complete.

## 2023-09-14 ENCOUNTER — OFFICE VISIT (OUTPATIENT)
Dept: FAMILY MEDICINE CLINIC | Facility: CLINIC | Age: 63
End: 2023-09-14
Payer: MEDICARE

## 2023-09-14 VITALS
DIASTOLIC BLOOD PRESSURE: 78 MMHG | BODY MASS INDEX: 26.13 KG/M2 | SYSTOLIC BLOOD PRESSURE: 110 MMHG | HEART RATE: 70 BPM | OXYGEN SATURATION: 98 % | HEIGHT: 69 IN | WEIGHT: 176.4 LBS | TEMPERATURE: 98.2 F

## 2023-09-14 DIAGNOSIS — Z23 NEED FOR INFLUENZA VACCINATION: ICD-10-CM

## 2023-09-14 DIAGNOSIS — Z23 NEED FOR PNEUMOCOCCAL VACCINE: ICD-10-CM

## 2023-09-14 DIAGNOSIS — D64.9 LOW HEMOGLOBIN: ICD-10-CM

## 2023-09-14 DIAGNOSIS — E78.2 MIXED HYPERLIPIDEMIA: ICD-10-CM

## 2023-09-14 DIAGNOSIS — G89.29 CHRONIC MIDLINE LOW BACK PAIN WITHOUT SCIATICA: ICD-10-CM

## 2023-09-14 DIAGNOSIS — M54.50 CHRONIC MIDLINE LOW BACK PAIN WITHOUT SCIATICA: ICD-10-CM

## 2023-09-14 DIAGNOSIS — Z13.29 THYROID DISORDER SCREENING: ICD-10-CM

## 2023-09-14 DIAGNOSIS — Z00.00 WELCOME TO MEDICARE PREVENTIVE VISIT: Primary | ICD-10-CM

## 2023-09-14 DIAGNOSIS — E55.9 VITAMIN D DEFICIENCY: ICD-10-CM

## 2023-09-14 LAB
25(OH)D3 SERPL-MCNC: 62.5 NG/ML (ref 30–100)
ALBUMIN SERPL-MCNC: 4.3 G/DL (ref 3.5–5.2)
ALBUMIN/GLOB SERPL: 1.4 G/DL
ALP SERPL-CCNC: 98 U/L (ref 39–117)
ALT SERPL W P-5'-P-CCNC: 19 U/L (ref 1–41)
ANION GAP SERPL CALCULATED.3IONS-SCNC: 10 MMOL/L (ref 5–15)
AST SERPL-CCNC: 21 U/L (ref 1–40)
BASOPHILS # BLD AUTO: 0.07 10*3/MM3 (ref 0–0.2)
BASOPHILS NFR BLD AUTO: 1 % (ref 0–1.5)
BILIRUB SERPL-MCNC: 0.5 MG/DL (ref 0–1.2)
BUN SERPL-MCNC: 18 MG/DL (ref 8–23)
BUN/CREAT SERPL: 18.9 (ref 7–25)
CALCIUM SPEC-SCNC: 9.8 MG/DL (ref 8.6–10.5)
CHLORIDE SERPL-SCNC: 108 MMOL/L (ref 98–107)
CHOLEST SERPL-MCNC: 153 MG/DL (ref 0–200)
CO2 SERPL-SCNC: 24 MMOL/L (ref 22–29)
CREAT SERPL-MCNC: 0.95 MG/DL (ref 0.76–1.27)
DEPRECATED RDW RBC AUTO: 43.3 FL (ref 37–54)
EGFRCR SERPLBLD CKD-EPI 2021: 88.8 ML/MIN/1.73
EOSINOPHIL # BLD AUTO: 0.3 10*3/MM3 (ref 0–0.4)
EOSINOPHIL NFR BLD AUTO: 4.1 % (ref 0.3–6.2)
ERYTHROCYTE [DISTWIDTH] IN BLOOD BY AUTOMATED COUNT: 13.1 % (ref 12.3–15.4)
FERRITIN SERPL-MCNC: 25.6 NG/ML (ref 30–400)
GLOBULIN UR ELPH-MCNC: 3.1 GM/DL
GLUCOSE SERPL-MCNC: 100 MG/DL (ref 65–99)
HCT VFR BLD AUTO: 35.1 % (ref 37.5–51)
HDLC SERPL-MCNC: 52 MG/DL (ref 40–60)
HGB BLD-MCNC: 11.3 G/DL (ref 13–17.7)
IMM GRANULOCYTES # BLD AUTO: 0.02 10*3/MM3 (ref 0–0.05)
IMM GRANULOCYTES NFR BLD AUTO: 0.3 % (ref 0–0.5)
IRON 24H UR-MRATE: 20 MCG/DL (ref 59–158)
IRON SATN MFR SERPL: 5 % (ref 20–50)
LDLC SERPL CALC-MCNC: 84 MG/DL (ref 0–100)
LDLC/HDLC SERPL: 1.58 {RATIO}
LYMPHOCYTES # BLD AUTO: 1.95 10*3/MM3 (ref 0.7–3.1)
LYMPHOCYTES NFR BLD AUTO: 26.9 % (ref 19.6–45.3)
MCH RBC QN AUTO: 29 PG (ref 26.6–33)
MCHC RBC AUTO-ENTMCNC: 32.2 G/DL (ref 31.5–35.7)
MCV RBC AUTO: 90.2 FL (ref 79–97)
MONOCYTES # BLD AUTO: 0.67 10*3/MM3 (ref 0.1–0.9)
MONOCYTES NFR BLD AUTO: 9.3 % (ref 5–12)
NEUTROPHILS NFR BLD AUTO: 4.23 10*3/MM3 (ref 1.7–7)
NEUTROPHILS NFR BLD AUTO: 58.4 % (ref 42.7–76)
NRBC BLD AUTO-RTO: 0 /100 WBC (ref 0–0.2)
PLATELET # BLD AUTO: 374 10*3/MM3 (ref 140–450)
PMV BLD AUTO: 11.1 FL (ref 6–12)
POTASSIUM SERPL-SCNC: 4.1 MMOL/L (ref 3.5–5.2)
PROT SERPL-MCNC: 7.4 G/DL (ref 6–8.5)
RBC # BLD AUTO: 3.89 10*6/MM3 (ref 4.14–5.8)
SODIUM SERPL-SCNC: 142 MMOL/L (ref 136–145)
TIBC SERPL-MCNC: 432 MCG/DL (ref 298–536)
TRANSFERRIN SERPL-MCNC: 290 MG/DL (ref 200–360)
TRIGL SERPL-MCNC: 93 MG/DL (ref 0–150)
TSH SERPL DL<=0.05 MIU/L-ACNC: 3.03 UIU/ML (ref 0.27–4.2)
VLDLC SERPL-MCNC: 17 MG/DL (ref 5–40)
WBC NRBC COR # BLD: 7.24 10*3/MM3 (ref 3.4–10.8)

## 2023-09-14 PROCEDURE — 82728 ASSAY OF FERRITIN: CPT | Performed by: NURSE PRACTITIONER

## 2023-09-14 PROCEDURE — 80053 COMPREHEN METABOLIC PANEL: CPT | Performed by: NURSE PRACTITIONER

## 2023-09-14 PROCEDURE — 84466 ASSAY OF TRANSFERRIN: CPT | Performed by: NURSE PRACTITIONER

## 2023-09-14 PROCEDURE — 85025 COMPLETE CBC W/AUTO DIFF WBC: CPT | Performed by: NURSE PRACTITIONER

## 2023-09-14 PROCEDURE — 84443 ASSAY THYROID STIM HORMONE: CPT | Performed by: NURSE PRACTITIONER

## 2023-09-14 PROCEDURE — 80061 LIPID PANEL: CPT | Performed by: NURSE PRACTITIONER

## 2023-09-14 PROCEDURE — 82306 VITAMIN D 25 HYDROXY: CPT | Performed by: NURSE PRACTITIONER

## 2023-09-14 PROCEDURE — 83540 ASSAY OF IRON: CPT | Performed by: NURSE PRACTITIONER

## 2023-09-14 RX ORDER — OMEPRAZOLE 20 MG/1
20 CAPSULE, DELAYED RELEASE ORAL DAILY
Qty: 90 CAPSULE | Refills: 1 | Status: SHIPPED | OUTPATIENT
Start: 2023-09-14

## 2023-09-14 RX ORDER — ATORVASTATIN CALCIUM 80 MG/1
80 TABLET, FILM COATED ORAL DAILY
Qty: 90 TABLET | Refills: 1 | Status: SHIPPED | OUTPATIENT
Start: 2023-09-14

## 2023-09-14 RX ORDER — METHYLPREDNISOLONE 4 MG/1
TABLET ORAL
Qty: 1 EACH | Refills: 3 | Status: SHIPPED | OUTPATIENT
Start: 2023-09-14

## 2023-09-14 RX ORDER — OMEGA-3S/DHA/EPA/FISH OIL/D3 300MG-1000
400 CAPSULE ORAL DAILY
Qty: 90 TABLET | Refills: 1 | Status: SHIPPED | OUTPATIENT
Start: 2023-09-14

## 2023-09-14 RX ORDER — TRAZODONE HYDROCHLORIDE 50 MG/1
50 TABLET ORAL NIGHTLY
Qty: 90 TABLET | Refills: 1 | Status: SHIPPED | OUTPATIENT
Start: 2023-09-14

## 2023-09-14 RX ORDER — CELECOXIB 200 MG/1
200 CAPSULE ORAL DAILY
Qty: 90 CAPSULE | Refills: 1 | Status: SHIPPED | OUTPATIENT
Start: 2023-09-14

## 2023-09-14 NOTE — PROGRESS NOTES
The ABCs of the Annual Wellness Visit  [facility]    Subjective     Andrew Miranda is a 63 y.o. male who presents for a  Welcome to Medicare Visit.    The following portions of the patient's history were reviewed and   updated as appropriate: allergies, current medications, past family history, past medical history, past social history, past surgical history, and problem list.     Compared to one year ago, the patient feels his physical   health is the same.    Compared to one year ago, the patient feels his mental   health is the same.    Recent Hospitalizations:  He was was admitted to the hospital during the last year.  [facility]      Current Medical Providers:  Patient Care Team:  Nasra Burton APRN as PCP - General (Nurse Practitioner)    Outpatient Medications Prior to Visit   Medication Sig Dispense Refill    atorvastatin (Lipitor) 80 MG tablet Take 1 tablet by mouth Daily. 90 tablet 1    celecoxib (CeleBREX) 200 MG capsule Take 1 capsule by mouth Daily. 90 capsule 1    cholecalciferol (VITAMIN D3) 10 MCG (400 UNIT) tablet Take 1 tablet by mouth Daily.      omeprazole (priLOSEC) 20 MG capsule Take 1 capsule by mouth Daily. 90 capsule 1    traZODone (DESYREL) 50 MG tablet Take 1 tablet by mouth Every Night. 90 tablet 1    docusate sodium (Colace) 100 MG capsule Take 1 capsule by mouth 2 (Two) Times a Day As Needed for Constipation. 10 capsule 1    docusate sodium (Colace) 100 MG capsule Take 1 capsule by mouth 2 (Two) Times a Day As Needed for Constipation. 10 capsule 1    HYDROcodone-acetaminophen (NORCO) 5-325 MG per tablet Take 1 tablet by mouth Every 4 (Four) Hours As Needed (Pain). 20 tablet 0    HYDROcodone-acetaminophen (Norco) 5-325 MG per tablet Take 1 tablet by mouth Every 4 (Four) Hours As Needed for Moderate Pain. 20 tablet 0    oxyCODONE-acetaminophen (PERCOCET) 5-325 MG per tablet Take 1 tablet by mouth Every 4 (Four) Hours As Needed for Moderate Pain. 10  "tablet 0     No facility-administered medications prior to visit.       Opioid medication/s are on active medication list.  and I have evaluated his active treatment plan and pain score trends (see table).  There were no vitals filed for this visit.  I have reviewed the chart for potential of high risk medication and harmful drug interactions in the elderly.          Aspirin is not on active medication list.  Aspirin use is not indicated based on review of current medical condition/s. Risk of harm outweighs potential benefits.  .    Patient Active Problem List   Diagnosis    Bilateral inguinal hernia    BPH with obstruction/lower urinary tract symptoms     Advance Care Planning   Advance Care Planning     Advance Directive is not on file.  ACP discussion was held with the patient during this visit. Patient does not have an advance directive, information provided.       Objective   Vitals:    09/14/23 1538   BP: 110/78   Pulse: 70   Temp: 98.2 °F (36.8 °C)   SpO2: 98%   Weight: 80 kg (176 lb 6.4 oz)   Height: 175.3 cm (69\")     Estimated body mass index is 26.05 kg/m² as calculated from the following:    Height as of this encounter: 175.3 cm (69\").    Weight as of this encounter: 80 kg (176 lb 6.4 oz).           Does the patient have evidence of cognitive impairment?   No    Lab Results   Component Value Date    TRIG 93 09/14/2023    HDL 52 09/14/2023    LDL 84 09/14/2023    VLDL 17 09/14/2023         ECG 12 Lead    Date/Time: 9/14/2023 4:10 PM  Performed by: Nsara Burton APRN  Authorized by: Nasra Burton APRN   Rhythm: sinus rhythm  Rate: normal  BPM: 62  Conduction: conduction normal  Conduction: right bundle branch block  ST Segments: ST segments normal  T Waves: T waves normal  QRS axis: normal    Clinical impression: abnormal EKG           HEALTH RISK ASSESSMENT    Smoking Status:  Social History     Tobacco Use   Smoking Status Never   Smokeless Tobacco Never     Alcohol Consumption:  Social " History     Substance and Sexual Activity   Alcohol Use Not Currently    Alcohol/week: 7.0 standard drinks    Types: 7 Shots of liquor per week    Comment: DAILY:  VODKA 1-2 NIGHTLY       Fall Risk Screen:    MARS Fall Risk Assessment was completed, and patient is at HIGH risk for falls. Assessment completed on:6/16/2023    Depression Screen:       3/8/2023    10:50 AM   PHQ-2/PHQ-9 Depression Screening   Little Interest or Pleasure in Doing Things 0-->not at all   Feeling Down, Depressed or Hopeless 0-->not at all   PHQ-9: Brief Depression Severity Measure Score 0       Health Habits and Functional and Cognitive Screening:       No data to display                Visual Acuity:    No results found.    Age-appropriate Screening Schedule:  Refer to the list below for future screening recommendations based on patient's age, sex and/or medical conditions. Orders for these recommended tests are listed in the plan section. The patient has been provided with a written plan.    Health Maintenance   Topic Date Due    ZOSTER VACCINE (2 of 2) 09/17/2025 (Originally 6/10/2021)    COVID-19 Vaccine (3 - Moderna series) 12/09/2025 (Originally 6/3/2021)    INFLUENZA VACCINE  10/01/2023    BMI FOLLOWUP  06/16/2024    ANNUAL WELLNESS VISIT  09/14/2024    LIPID PANEL  09/14/2024    COLORECTAL CANCER SCREENING  07/20/2027    TDAP/TD VACCINES (2 - Td or Tdap) 06/30/2028    HEPATITIS C SCREENING  Completed    Pneumococcal Vaccine 65+  Completed        CMS Preventative Services Quick Reference  Risk Factors Identified During Encounter    None Identified  The above risks/problems have been discussed with the patient.  Pertinent information has been shared with the patient in the After Visit Summary.    Follow Up:   Initial Medicare Visit in one year    An After Visit Summary and PPPS were made available to the patient.      Additional E&M Note during same encounter follows:  Patient has multiple medical problems which are significant and  "separately identifiable that require additional work above and beyond the Medicare Wellness Visit.      Chief Complaint  Welcome To Medicare    Subjective        HPI  Andrew Miranda is also being seen today for   Hyperlipidemia  This is a chronic problem. The current episode started more than 1 year ago. The problem is controlled. He has no history of diabetes or obesity. Current antihyperlipidemic treatment includes statins and diet change. The current treatment provides moderate improvement of lipids. There are no compliance problems.  Risk factors for coronary artery disease include dyslipidemia, family history and male sex.    Heartburn  He complains of heartburn. This is a chronic problem. The current episode started more than 1 year ago. The heartburn is located in the substernum. The heartburn does not wake him from sleep. The heartburn does not limit his activity. The heartburn doesn't change with position. The symptoms are aggravated by certain foods and lying down. There are no known risk factors. The treatment provided significant relief.        Objective   Vital Signs:  /78   Pulse 70   Temp 98.2 °F (36.8 °C)   Ht 175.3 cm (69\")   Wt 80 kg (176 lb 6.4 oz)   SpO2 98%   BMI 26.05 kg/m²     Physical Exam  Vitals reviewed.   Constitutional:       Appearance: Normal appearance. He is well-developed.   HENT:      Head: Normocephalic and atraumatic.   Eyes:      Conjunctiva/sclera: Conjunctivae normal.      Pupils: Pupils are equal, round, and reactive to light.   Cardiovascular:      Rate and Rhythm: Normal rate and regular rhythm.      Heart sounds: No murmur heard.  Pulmonary:      Effort: Pulmonary effort is normal.      Breath sounds: Normal breath sounds. No wheezing or rhonchi.   Skin:     General: Skin is warm and dry.   Neurological:      Mental Status: He is alert and oriented to person, place, and time.   Psychiatric:         Mood and Affect: Mood and affect normal.         Behavior: " Behavior normal.         Thought Content: Thought content normal.         Judgment: Judgment normal.        The following data was reviewed by: ZANE Maciel on 09/14/2023:  Common labs          8/3/2023    06:37 8/4/2023    04:18 9/14/2023    16:30   Common Labs   Glucose 165  108  100    BUN 23  14  18    Creatinine 1.78  1.06  0.95    Sodium 138  138  142    Potassium 5.1  4.4  4.1    Chloride 109  109  108    Calcium 9.5  7.9  9.8    Albumin   4.3    Total Bilirubin   0.5    Alkaline Phosphatase   98    AST (SGOT)   21    ALT (SGPT)   19    WBC 13.72  13.87  7.24    Hemoglobin 10.4  8.9  11.3    Hematocrit 30.1  26.2  35.1    Platelets 195  144  374    Total Cholesterol   153    Triglycerides   93    HDL Cholesterol   52    LDL Cholesterol    84      Data reviewed : Previous office note         Assessment and Plan   Diagnoses and all orders for this visit:    1. Welcome to Medicare preventive visit (Primary)  -     ECG 12 Lead    2. Chronic midline low back pain without sciatica  Comments:  Stable on current medications we will continue to monitor    3. Mixed hyperlipidemia  Comments:  We will recheck labs, adjust medication if needed  Orders:  -     CBC & Differential  -     Comprehensive Metabolic Panel  -     Lipid Panel    4. Vitamin D deficiency  -     Vitamin D,25-Hydroxy    5. Thyroid disorder screening  -     TSH    6. Need for pneumococcal vaccine    7. Need for influenza vaccination    8. Low hemoglobin  Comments:  Pt was admitted to the hospital for BPH prostate procedure and recent inguinal hernia repair.We will check hemoglobin hematocrit and iron levels pt is agreeable  Orders:  -     Iron Profile  -     Ferritin    Other orders  -     Pneumococcal Conjugate Vaccine 20-Valent (PCV20)  -     Fluzone High-Dose 65+yrs (3659-0890)  -     methylPREDNISolone (MEDROL) 4 MG dose pack; Take as directed on package instructions.  Dispense: 1 each; Refill: 3  -     atorvastatin (Lipitor) 80 MG  tablet; Take 1 tablet by mouth Daily.  Dispense: 90 tablet; Refill: 1  -     celecoxib (CeleBREX) 200 MG capsule; Take 1 capsule by mouth Daily.  Dispense: 90 capsule; Refill: 1  -     cholecalciferol (VITAMIN D3) 10 MCG (400 UNIT) tablet; Take 1 tablet by mouth Daily.  Dispense: 90 tablet; Refill: 1  -     omeprazole (priLOSEC) 20 MG capsule; Take 1 capsule by mouth Daily.  Dispense: 90 capsule; Refill: 1  -     traZODone (DESYREL) 50 MG tablet; Take 1 tablet by mouth Every Night.  Dispense: 90 tablet; Refill: 1             Follow Up   Return in about 6 months (around 3/14/2024) for Recheck.  Patient was given instructions and counseling regarding his condition or for health maintenance advice. Please see specific information pulled into the AVS if appropriate.

## 2023-09-15 NOTE — PROGRESS NOTES
Called and spoke to Andrew Miranda to relay results and Rx update. Pt verbalized understanding. No further questions/concerns at this time.

## 2024-03-14 ENCOUNTER — OFFICE VISIT (OUTPATIENT)
Dept: FAMILY MEDICINE CLINIC | Facility: CLINIC | Age: 64
End: 2024-03-14
Payer: MEDICARE

## 2024-03-14 VITALS
SYSTOLIC BLOOD PRESSURE: 110 MMHG | HEIGHT: 69 IN | WEIGHT: 181.5 LBS | DIASTOLIC BLOOD PRESSURE: 74 MMHG | OXYGEN SATURATION: 97 % | TEMPERATURE: 97.6 F | BODY MASS INDEX: 26.88 KG/M2 | HEART RATE: 61 BPM

## 2024-03-14 DIAGNOSIS — Z09 FOLLOW-UP EXAM: Primary | ICD-10-CM

## 2024-03-14 DIAGNOSIS — E78.2 MIXED HYPERLIPIDEMIA: ICD-10-CM

## 2024-03-14 DIAGNOSIS — E55.9 VITAMIN D DEFICIENCY: ICD-10-CM

## 2024-03-14 DIAGNOSIS — G89.29 CHRONIC MIDLINE LOW BACK PAIN, UNSPECIFIED WHETHER SCIATICA PRESENT: ICD-10-CM

## 2024-03-14 DIAGNOSIS — M54.50 CHRONIC MIDLINE LOW BACK PAIN, UNSPECIFIED WHETHER SCIATICA PRESENT: ICD-10-CM

## 2024-03-14 DIAGNOSIS — Z12.5 PROSTATE CANCER SCREENING: ICD-10-CM

## 2024-03-14 DIAGNOSIS — D50.9 IRON DEFICIENCY ANEMIA, UNSPECIFIED IRON DEFICIENCY ANEMIA TYPE: ICD-10-CM

## 2024-03-14 DIAGNOSIS — K21.9 GASTROESOPHAGEAL REFLUX DISEASE, UNSPECIFIED WHETHER ESOPHAGITIS PRESENT: ICD-10-CM

## 2024-03-14 LAB
25(OH)D3 SERPL-MCNC: 63.5 NG/ML (ref 30–100)
ALBUMIN SERPL-MCNC: 4.5 G/DL (ref 3.5–5.2)
ALBUMIN/GLOB SERPL: 1.7 G/DL
ALP SERPL-CCNC: 74 U/L (ref 39–117)
ALT SERPL W P-5'-P-CCNC: 27 U/L (ref 1–41)
ANION GAP SERPL CALCULATED.3IONS-SCNC: 7 MMOL/L (ref 5–15)
AST SERPL-CCNC: 33 U/L (ref 1–40)
BASOPHILS # BLD AUTO: 0.06 10*3/MM3 (ref 0–0.2)
BASOPHILS NFR BLD AUTO: 1.3 % (ref 0–1.5)
BILIRUB SERPL-MCNC: 1.9 MG/DL (ref 0–1.2)
BUN SERPL-MCNC: 23 MG/DL (ref 8–23)
BUN/CREAT SERPL: 19.8 (ref 7–25)
CALCIUM SPEC-SCNC: 9.6 MG/DL (ref 8.6–10.5)
CHLORIDE SERPL-SCNC: 107 MMOL/L (ref 98–107)
CHOLEST SERPL-MCNC: 141 MG/DL (ref 0–200)
CO2 SERPL-SCNC: 26 MMOL/L (ref 22–29)
CREAT SERPL-MCNC: 1.16 MG/DL (ref 0.76–1.27)
DEPRECATED RDW RBC AUTO: 45.6 FL (ref 37–54)
EGFRCR SERPLBLD CKD-EPI 2021: 69.9 ML/MIN/1.73
EOSINOPHIL # BLD AUTO: 0.18 10*3/MM3 (ref 0–0.4)
EOSINOPHIL NFR BLD AUTO: 3.9 % (ref 0.3–6.2)
ERYTHROCYTE [DISTWIDTH] IN BLOOD BY AUTOMATED COUNT: 13.3 % (ref 12.3–15.4)
FERRITIN SERPL-MCNC: 149 NG/ML (ref 30–400)
GLOBULIN UR ELPH-MCNC: 2.7 GM/DL
GLUCOSE SERPL-MCNC: 96 MG/DL (ref 65–99)
HCT VFR BLD AUTO: 42.8 % (ref 37.5–51)
HDLC SERPL-MCNC: 56 MG/DL (ref 40–60)
HGB BLD-MCNC: 14.5 G/DL (ref 13–17.7)
IMM GRANULOCYTES # BLD AUTO: 0 10*3/MM3 (ref 0–0.05)
IMM GRANULOCYTES NFR BLD AUTO: 0 % (ref 0–0.5)
IRON 24H UR-MRATE: 96 MCG/DL (ref 59–158)
IRON SATN MFR SERPL: 28 % (ref 20–50)
LDLC SERPL CALC-MCNC: 74 MG/DL (ref 0–100)
LDLC/HDLC SERPL: 1.35 {RATIO}
LYMPHOCYTES # BLD AUTO: 1.52 10*3/MM3 (ref 0.7–3.1)
LYMPHOCYTES NFR BLD AUTO: 33.3 % (ref 19.6–45.3)
MCH RBC QN AUTO: 32 PG (ref 26.6–33)
MCHC RBC AUTO-ENTMCNC: 33.9 G/DL (ref 31.5–35.7)
MCV RBC AUTO: 94.5 FL (ref 79–97)
MONOCYTES # BLD AUTO: 0.61 10*3/MM3 (ref 0.1–0.9)
MONOCYTES NFR BLD AUTO: 13.3 % (ref 5–12)
NEUTROPHILS NFR BLD AUTO: 2.2 10*3/MM3 (ref 1.7–7)
NEUTROPHILS NFR BLD AUTO: 48.2 % (ref 42.7–76)
NRBC BLD AUTO-RTO: 0 /100 WBC (ref 0–0.2)
PLATELET # BLD AUTO: 238 10*3/MM3 (ref 140–450)
PMV BLD AUTO: 11.3 FL (ref 6–12)
POTASSIUM SERPL-SCNC: 4.4 MMOL/L (ref 3.5–5.2)
PROT SERPL-MCNC: 7.2 G/DL (ref 6–8.5)
PSA SERPL-MCNC: 3.73 NG/ML (ref 0–4)
RBC # BLD AUTO: 4.53 10*6/MM3 (ref 4.14–5.8)
SODIUM SERPL-SCNC: 140 MMOL/L (ref 136–145)
TIBC SERPL-MCNC: 343 MCG/DL (ref 298–536)
TRANSFERRIN SERPL-MCNC: 230 MG/DL (ref 200–360)
TRIGL SERPL-MCNC: 47 MG/DL (ref 0–150)
VLDLC SERPL-MCNC: 11 MG/DL (ref 5–40)
WBC NRBC COR # BLD AUTO: 4.57 10*3/MM3 (ref 3.4–10.8)

## 2024-03-14 PROCEDURE — 83540 ASSAY OF IRON: CPT | Performed by: NURSE PRACTITIONER

## 2024-03-14 PROCEDURE — 84466 ASSAY OF TRANSFERRIN: CPT | Performed by: NURSE PRACTITIONER

## 2024-03-14 PROCEDURE — G0103 PSA SCREENING: HCPCS | Performed by: NURSE PRACTITIONER

## 2024-03-14 PROCEDURE — 82306 VITAMIN D 25 HYDROXY: CPT | Performed by: NURSE PRACTITIONER

## 2024-03-14 PROCEDURE — 80061 LIPID PANEL: CPT | Performed by: NURSE PRACTITIONER

## 2024-03-14 PROCEDURE — 82728 ASSAY OF FERRITIN: CPT | Performed by: NURSE PRACTITIONER

## 2024-03-14 PROCEDURE — 80053 COMPREHEN METABOLIC PANEL: CPT | Performed by: NURSE PRACTITIONER

## 2024-03-14 PROCEDURE — 85025 COMPLETE CBC W/AUTO DIFF WBC: CPT | Performed by: NURSE PRACTITIONER

## 2024-03-14 RX ORDER — TRAZODONE HYDROCHLORIDE 50 MG/1
50 TABLET ORAL NIGHTLY
Qty: 90 TABLET | Refills: 1 | Status: SHIPPED | OUTPATIENT
Start: 2024-03-14

## 2024-03-14 RX ORDER — ATORVASTATIN CALCIUM 80 MG/1
80 TABLET, FILM COATED ORAL DAILY
Qty: 90 TABLET | Refills: 1 | Status: SHIPPED | OUTPATIENT
Start: 2024-03-14

## 2024-03-14 RX ORDER — CHOLECALCIFEROL (VITAMIN D3) 125 MCG
1 CAPSULE ORAL DAILY
Qty: 90 CAPSULE | Refills: 1 | Status: SHIPPED | OUTPATIENT
Start: 2024-03-14

## 2024-03-14 RX ORDER — CELECOXIB 200 MG/1
200 CAPSULE ORAL DAILY
Qty: 90 CAPSULE | Refills: 1 | Status: SHIPPED | OUTPATIENT
Start: 2024-03-14

## 2024-03-14 RX ORDER — OMEPRAZOLE 40 MG/1
40 CAPSULE, DELAYED RELEASE ORAL DAILY
Qty: 90 CAPSULE | Refills: 1 | Status: SHIPPED | OUTPATIENT
Start: 2024-03-14

## 2024-03-14 RX ORDER — OXYCODONE HYDROCHLORIDE AND ACETAMINOPHEN 5; 325 MG/1; MG/1
1 TABLET ORAL EVERY 4 HOURS PRN
Qty: 10 TABLET | Refills: 0 | Status: SHIPPED | OUTPATIENT
Start: 2024-03-14

## 2024-03-14 RX ORDER — OMEGA-3S/DHA/EPA/FISH OIL/D3 300MG-1000
400 CAPSULE ORAL DAILY
Qty: 90 TABLET | Refills: 1 | Status: SHIPPED | OUTPATIENT
Start: 2024-03-14

## 2024-03-14 NOTE — PROGRESS NOTES
Chief Complaint  Heartburn (Started to get bad a couple months ago )    Subjective        Medical History: has a past medical history of BPH (benign prostatic hyperplasia), BPH with obstruction/lower urinary tract symptoms (08/02/2023), Chronic lumbar pain, Elevated PSA (9/1/2022), Frequent urination, GERD (gastroesophageal reflux disease), History of kidney stones, Hyperlipidemia, Insomnia, and Left inguinal hernia.     Surgical History: has a past surgical history that includes Colonoscopy (07/20/2017); Umbilical hernia repair; Epidural block injection; Lithotripsy; Vasectomy; Fracture surgery (1970); PROSTATE AQUABLATION (N/A, 08/02/2023); and Inguinal Hernia Repair (Bilateral, 8/21/2023).     Family History: family history includes Breast cancer in an other family member; Cancer in his father and mother; Stroke in an other family member.     Social History: reports that he has never smoked. He has never used smokeless tobacco. He reports that he does not currently use alcohol after a past usage of about 7.0 standard drinks of alcohol per week. He reports that he does not use drugs.    Andrew Miranda presents to [facility]  History of Present Illness  Hyperlipidemia  This is a chronic problem. The current episode started more than 1 year ago. The problem is controlled. He has no history of diabetes or obesity. Current antihyperlipidemic treatment includes statins and diet change. The current treatment provides moderate improvement of lipids. There are no compliance problems.  Risk factors for coronary artery disease include dyslipidemia, family history and male sex.    Heartburn  He complains of heartburn. This is a chronic problem. The current episode started more than 1 year ago. The heartburn is located in the substernum. The heartburn does not wake him from sleep. The heartburn does not limit his activity. The heartburn doesn't change with position. The symptoms are  "aggravated by certain foods and lying down. There are no known risk factors. The treatment provided significant relief.     Objective   Vital Signs:   /74   Pulse 61   Temp 97.6 °F (36.4 °C)   Ht 175.3 cm (69\")   Wt 82.3 kg (181 lb 8 oz)   SpO2 97%   BMI 26.80 kg/m²       Wt Readings from Last 3 Encounters:   03/14/24 82.3 kg (181 lb 8 oz)   09/14/23 80 kg (176 lb 6.4 oz)   09/05/23 79.9 kg (176 lb 3.2 oz)        BP Readings from Last 3 Encounters:   03/14/24 110/74   09/14/23 110/78   08/21/23 108/62                 Physical Exam  Vitals reviewed.   Constitutional:       General: He is not in acute distress.     Appearance: Normal appearance. He is well-developed. He is not ill-appearing.   HENT:      Head: Normocephalic and atraumatic.   Eyes:      Conjunctiva/sclera: Conjunctivae normal.      Pupils: Pupils are equal, round, and reactive to light.   Cardiovascular:      Rate and Rhythm: Normal rate and regular rhythm.      Heart sounds: No murmur heard.  Pulmonary:      Effort: Pulmonary effort is normal.      Breath sounds: Normal breath sounds. No wheezing.   Musculoskeletal:      Right lower leg: No edema.      Left lower leg: No edema.   Skin:     General: Skin is warm and dry.   Neurological:      Mental Status: He is alert and oriented to person, place, and time.   Psychiatric:         Mood and Affect: Mood and affect normal.         Behavior: Behavior normal.         Thought Content: Thought content normal.         Judgment: Judgment normal.        Result Review :  {The following data was reviewed by ZANE Maciel on 03/14/2024.  Common labs          8/3/2023    06:37 8/4/2023    04:18 9/14/2023    16:30   Common Labs   Glucose 165  108  100    BUN 23  14  18    Creatinine 1.78  1.06  0.95    Sodium 138  138  142    Potassium 5.1  4.4  4.1    Chloride 109  109  108    Calcium 9.5  7.9  9.8    Albumin   4.3    Total Bilirubin   0.5    Alkaline Phosphatase   98    AST (SGOT)   21  "   ALT (SGPT)   19    WBC 13.72  13.87  7.24    Hemoglobin 10.4  8.9  11.3    Hematocrit 30.1  26.2  35.1    Platelets 195  144  374    Total Cholesterol   153    Triglycerides   93    HDL Cholesterol   52    LDL Cholesterol    84      Data reviewed : previous office note             Current Outpatient Medications on File Prior to Visit   Medication Sig Dispense Refill    [DISCONTINUED] atorvastatin (Lipitor) 80 MG tablet Take 1 tablet by mouth Daily. 90 tablet 1    [DISCONTINUED] celecoxib (CeleBREX) 200 MG capsule Take 1 capsule by mouth Daily. 90 capsule 1    [DISCONTINUED] cholecalciferol (VITAMIN D3) 10 MCG (400 UNIT) tablet Take 1 tablet by mouth Daily. 90 tablet 1    [DISCONTINUED] Iron, Ferrous Sulfate, 325 (65 Fe) MG tablet Take 1 tablet by mouth Daily. 90 tablet 1    [DISCONTINUED] omeprazole (priLOSEC) 20 MG capsule Take 1 capsule by mouth Daily. 90 capsule 1    [DISCONTINUED] traZODone (DESYREL) 50 MG tablet Take 1 tablet by mouth Every Night. 90 tablet 1    [DISCONTINUED] docusate sodium (Colace) 100 MG capsule Take 1 capsule by mouth 2 (Two) Times a Day As Needed for Constipation. 10 capsule 1    [DISCONTINUED] docusate sodium (Colace) 100 MG capsule Take 1 capsule by mouth 2 (Two) Times a Day As Needed for Constipation. 10 capsule 1    [DISCONTINUED] HYDROcodone-acetaminophen (NORCO) 5-325 MG per tablet Take 1 tablet by mouth Every 4 (Four) Hours As Needed (Pain). 20 tablet 0    [DISCONTINUED] HYDROcodone-acetaminophen (Norco) 5-325 MG per tablet Take 1 tablet by mouth Every 4 (Four) Hours As Needed for Moderate Pain. 20 tablet 0    [DISCONTINUED] methylPREDNISolone (MEDROL) 4 MG dose pack Take as directed on package instructions. 1 each 3    [DISCONTINUED] oxyCODONE-acetaminophen (PERCOCET) 5-325 MG per tablet Take 1 tablet by mouth Every 4 (Four) Hours As Needed for Moderate Pain. 10 tablet 0     No current facility-administered medications on file prior to visit.        Assessment and  Plan  Diagnoses and all orders for this visit:    1. Follow-up exam (Primary)    2. Mixed hyperlipidemia  -     Lipid Panel  -     Comprehensive Metabolic Panel  -     CBC & Differential    3. Gastroesophageal reflux disease, unspecified whether esophagitis present    4. Prostate cancer screening  -     PSA Screen    5. Vitamin D deficiency  -     Vitamin D,25-Hydroxy    6. Iron deficiency anemia, unspecified iron deficiency anemia type  -     Ferritin  -     Iron Profile    7. Chronic midline low back pain, unspecified whether sciatica present  -     oxyCODONE-acetaminophen (PERCOCET) 5-325 MG per tablet; Take 1 tablet by mouth Every 4 (Four) Hours As Needed for Moderate Pain or Severe Pain.  Dispense: 10 tablet; Refill: 0    Other orders  -     omeprazole (priLOSEC) 40 MG capsule; Take 1 capsule by mouth Daily.  Dispense: 90 capsule; Refill: 1  -     Lactobacillus (Probiotic Acidophilus) capsule; Take 1 capsule by mouth Daily.  Dispense: 90 capsule; Refill: 1  -     traZODone (DESYREL) 50 MG tablet; Take 1 tablet by mouth Every Night.  Dispense: 90 tablet; Refill: 1  -     Iron, Ferrous Sulfate, 325 (65 Fe) MG tablet; Take 1 tablet by mouth Daily.  Dispense: 90 tablet; Refill: 1  -     cholecalciferol (VITAMIN D3) 10 MCG (400 UNIT) tablet; Take 1 tablet by mouth Daily.  Dispense: 90 tablet; Refill: 1  -     celecoxib (CeleBREX) 200 MG capsule; Take 1 capsule by mouth Daily.  Dispense: 90 capsule; Refill: 1  -     atorvastatin (Lipitor) 80 MG tablet; Take 1 tablet by mouth Daily.  Dispense: 90 tablet; Refill: 1    Will check labs, adjust medications if needed.  Patient is currently on omeprazole start having issues with reflux, states he takes Tums over-the-counter and that has been helping states the reflux started when he started on the iron supplement.  Discussed with patient we will hold on iron supplement until labs come back we will recheck iron today, if he is still in need of iron we will likely adjust  dose.  Discussed return precautions.  Patient verbalized understanding.    Follow Up   Return in about 6 months (around 9/14/2024) for Next scheduled follow up.  Patient was given instructions and counseling regarding his condition or for health maintenance advice. Please see specific information pulled into the AVS if appropriate.       Part of this note may be electronic transcription/translation of spoken language to printed text using the Dragon dictation system

## 2024-03-18 DIAGNOSIS — R17 ELEVATED BILIRUBIN: Primary | ICD-10-CM

## 2024-05-03 ENCOUNTER — CLINICAL SUPPORT (OUTPATIENT)
Dept: FAMILY MEDICINE CLINIC | Facility: CLINIC | Age: 64
End: 2024-05-03
Payer: MEDICARE

## 2024-05-03 DIAGNOSIS — R17 ELEVATED BILIRUBIN: ICD-10-CM

## 2024-05-03 LAB
BILIRUB CONJ SERPL-MCNC: 0.2 MG/DL (ref 0–0.3)
BILIRUB INDIRECT SERPL-MCNC: 0.6 MG/DL
BILIRUB SERPL-MCNC: 0.8 MG/DL (ref 0–1.2)

## 2024-05-03 PROCEDURE — 82248 BILIRUBIN DIRECT: CPT | Performed by: NURSE PRACTITIONER

## 2024-05-03 PROCEDURE — 82247 BILIRUBIN TOTAL: CPT | Performed by: NURSE PRACTITIONER

## 2024-05-03 PROCEDURE — 36415 COLL VENOUS BLD VENIPUNCTURE: CPT | Performed by: NURSE PRACTITIONER

## 2024-05-06 NOTE — PROGRESS NOTES
Called and spoke to Andrew Miranda to relay results. Pt verbalized understanding. No further questions/concerns at this time.

## 2024-08-28 RX ORDER — ATORVASTATIN CALCIUM 80 MG/1
80 TABLET, FILM COATED ORAL DAILY
Qty: 90 TABLET | Refills: 1 | Status: SHIPPED | OUTPATIENT
Start: 2024-08-28

## 2024-08-28 RX ORDER — OMEPRAZOLE 40 MG/1
40 CAPSULE, DELAYED RELEASE ORAL DAILY
Qty: 90 CAPSULE | Refills: 1 | Status: SHIPPED | OUTPATIENT
Start: 2024-08-28

## 2024-08-28 RX ORDER — TRAZODONE HYDROCHLORIDE 50 MG/1
50 TABLET, FILM COATED ORAL NIGHTLY
Qty: 90 TABLET | Refills: 1 | Status: SHIPPED | OUTPATIENT
Start: 2024-08-28

## 2024-08-28 RX ORDER — OMEGA-3S/DHA/EPA/FISH OIL/D3 300MG-1000
400 CAPSULE ORAL DAILY
Qty: 90 TABLET | Refills: 1 | Status: SHIPPED | OUTPATIENT
Start: 2024-08-28

## 2024-08-28 RX ORDER — CELECOXIB 200 MG/1
200 CAPSULE ORAL DAILY
Qty: 90 CAPSULE | Refills: 1 | Status: SHIPPED | OUTPATIENT
Start: 2024-08-28

## 2024-09-27 ENCOUNTER — OFFICE VISIT (OUTPATIENT)
Dept: FAMILY MEDICINE CLINIC | Facility: CLINIC | Age: 64
End: 2024-09-27
Payer: MEDICARE

## 2024-09-27 VITALS
BODY MASS INDEX: 25.2 KG/M2 | WEIGHT: 176 LBS | SYSTOLIC BLOOD PRESSURE: 106 MMHG | OXYGEN SATURATION: 98 % | HEART RATE: 62 BPM | DIASTOLIC BLOOD PRESSURE: 76 MMHG | HEIGHT: 70 IN | TEMPERATURE: 98.2 F

## 2024-09-27 DIAGNOSIS — Z13.29 THYROID DISORDER SCREENING: ICD-10-CM

## 2024-09-27 DIAGNOSIS — G89.29 CHRONIC MIDLINE LOW BACK PAIN, UNSPECIFIED WHETHER SCIATICA PRESENT: ICD-10-CM

## 2024-09-27 DIAGNOSIS — G47.00 INSOMNIA, UNSPECIFIED TYPE: ICD-10-CM

## 2024-09-27 DIAGNOSIS — E78.2 MIXED HYPERLIPIDEMIA: ICD-10-CM

## 2024-09-27 DIAGNOSIS — M54.50 CHRONIC MIDLINE LOW BACK PAIN, UNSPECIFIED WHETHER SCIATICA PRESENT: ICD-10-CM

## 2024-09-27 DIAGNOSIS — Z23 NEED FOR INFLUENZA VACCINATION: ICD-10-CM

## 2024-09-27 DIAGNOSIS — Z00.00 INITIAL MEDICARE ANNUAL WELLNESS VISIT: Primary | ICD-10-CM

## 2024-09-27 DIAGNOSIS — D50.9 IRON DEFICIENCY ANEMIA, UNSPECIFIED IRON DEFICIENCY ANEMIA TYPE: ICD-10-CM

## 2024-09-27 DIAGNOSIS — N13.8 BPH WITH OBSTRUCTION/LOWER URINARY TRACT SYMPTOMS: ICD-10-CM

## 2024-09-27 DIAGNOSIS — J30.89 NON-SEASONAL ALLERGIC RHINITIS, UNSPECIFIED TRIGGER: ICD-10-CM

## 2024-09-27 DIAGNOSIS — E55.9 VITAMIN D DEFICIENCY: ICD-10-CM

## 2024-09-27 DIAGNOSIS — N40.1 BPH WITH OBSTRUCTION/LOWER URINARY TRACT SYMPTOMS: ICD-10-CM

## 2024-09-27 DIAGNOSIS — E66.3 OVERWEIGHT WITH BODY MASS INDEX (BMI) OF 25 TO 25.9 IN ADULT: ICD-10-CM

## 2024-09-27 LAB
25(OH)D3 SERPL-MCNC: 63.4 NG/ML (ref 30–100)
ALBUMIN SERPL-MCNC: 4.1 G/DL (ref 3.5–5.2)
ALBUMIN/GLOB SERPL: 1.4 G/DL
ALP SERPL-CCNC: 82 U/L (ref 39–117)
ALT SERPL W P-5'-P-CCNC: 36 U/L (ref 1–41)
ANION GAP SERPL CALCULATED.3IONS-SCNC: 7.5 MMOL/L (ref 5–15)
AST SERPL-CCNC: 30 U/L (ref 1–40)
BASOPHILS # BLD AUTO: 0.04 10*3/MM3 (ref 0–0.2)
BASOPHILS NFR BLD AUTO: 0.5 % (ref 0–1.5)
BILIRUB SERPL-MCNC: 1.1 MG/DL (ref 0–1.2)
BUN SERPL-MCNC: 21 MG/DL (ref 8–23)
BUN/CREAT SERPL: 21 (ref 7–25)
CALCIUM SPEC-SCNC: 9.3 MG/DL (ref 8.6–10.5)
CHLORIDE SERPL-SCNC: 106 MMOL/L (ref 98–107)
CHOLEST SERPL-MCNC: 140 MG/DL (ref 0–200)
CO2 SERPL-SCNC: 25.5 MMOL/L (ref 22–29)
CREAT SERPL-MCNC: 1 MG/DL (ref 0.76–1.27)
DEPRECATED RDW RBC AUTO: 43.3 FL (ref 37–54)
EGFRCR SERPLBLD CKD-EPI 2021: 83 ML/MIN/1.73
EOSINOPHIL # BLD AUTO: 0.14 10*3/MM3 (ref 0–0.4)
EOSINOPHIL NFR BLD AUTO: 1.7 % (ref 0.3–6.2)
ERYTHROCYTE [DISTWIDTH] IN BLOOD BY AUTOMATED COUNT: 12.2 % (ref 12.3–15.4)
FERRITIN SERPL-MCNC: 183 NG/ML (ref 30–400)
GLOBULIN UR ELPH-MCNC: 3 GM/DL
GLUCOSE SERPL-MCNC: 99 MG/DL (ref 65–99)
HCT VFR BLD AUTO: 42.7 % (ref 37.5–51)
HDLC SERPL-MCNC: 42 MG/DL (ref 40–60)
HGB BLD-MCNC: 13.7 G/DL (ref 13–17.7)
IMM GRANULOCYTES # BLD AUTO: 0.03 10*3/MM3 (ref 0–0.05)
IMM GRANULOCYTES NFR BLD AUTO: 0.4 % (ref 0–0.5)
IRON 24H UR-MRATE: 112 MCG/DL (ref 59–158)
IRON SATN MFR SERPL: 32 % (ref 20–50)
LDLC SERPL CALC-MCNC: 85 MG/DL (ref 0–100)
LDLC/HDLC SERPL: 2.03 {RATIO}
LYMPHOCYTES # BLD AUTO: 1.59 10*3/MM3 (ref 0.7–3.1)
LYMPHOCYTES NFR BLD AUTO: 19.7 % (ref 19.6–45.3)
MCH RBC QN AUTO: 31 PG (ref 26.6–33)
MCHC RBC AUTO-ENTMCNC: 32.1 G/DL (ref 31.5–35.7)
MCV RBC AUTO: 96.6 FL (ref 79–97)
MONOCYTES # BLD AUTO: 0.71 10*3/MM3 (ref 0.1–0.9)
MONOCYTES NFR BLD AUTO: 8.8 % (ref 5–12)
NEUTROPHILS NFR BLD AUTO: 5.57 10*3/MM3 (ref 1.7–7)
NEUTROPHILS NFR BLD AUTO: 68.9 % (ref 42.7–76)
NRBC BLD AUTO-RTO: 0 /100 WBC (ref 0–0.2)
PLATELET # BLD AUTO: 368 10*3/MM3 (ref 140–450)
PMV BLD AUTO: 10.5 FL (ref 6–12)
POTASSIUM SERPL-SCNC: 4.7 MMOL/L (ref 3.5–5.2)
PROT SERPL-MCNC: 7.1 G/DL (ref 6–8.5)
PSA SERPL-MCNC: 5.17 NG/ML (ref 0–4)
RBC # BLD AUTO: 4.42 10*6/MM3 (ref 4.14–5.8)
SODIUM SERPL-SCNC: 139 MMOL/L (ref 136–145)
TIBC SERPL-MCNC: 346 MCG/DL (ref 298–536)
TRANSFERRIN SERPL-MCNC: 232 MG/DL (ref 200–360)
TRIGL SERPL-MCNC: 63 MG/DL (ref 0–150)
TSH SERPL DL<=0.05 MIU/L-ACNC: 1.97 UIU/ML (ref 0.27–4.2)
VLDLC SERPL-MCNC: 13 MG/DL (ref 5–40)
WBC NRBC COR # BLD AUTO: 8.08 10*3/MM3 (ref 3.4–10.8)

## 2024-09-27 PROCEDURE — 84466 ASSAY OF TRANSFERRIN: CPT | Performed by: NURSE PRACTITIONER

## 2024-09-27 PROCEDURE — 82306 VITAMIN D 25 HYDROXY: CPT | Performed by: NURSE PRACTITIONER

## 2024-09-27 PROCEDURE — 82728 ASSAY OF FERRITIN: CPT | Performed by: NURSE PRACTITIONER

## 2024-09-27 PROCEDURE — 85025 COMPLETE CBC W/AUTO DIFF WBC: CPT | Performed by: NURSE PRACTITIONER

## 2024-09-27 PROCEDURE — 80053 COMPREHEN METABOLIC PANEL: CPT | Performed by: NURSE PRACTITIONER

## 2024-09-27 PROCEDURE — 84443 ASSAY THYROID STIM HORMONE: CPT | Performed by: NURSE PRACTITIONER

## 2024-09-27 PROCEDURE — 84153 ASSAY OF PSA TOTAL: CPT | Performed by: NURSE PRACTITIONER

## 2024-09-27 PROCEDURE — 80061 LIPID PANEL: CPT | Performed by: NURSE PRACTITIONER

## 2024-09-27 PROCEDURE — 83540 ASSAY OF IRON: CPT | Performed by: NURSE PRACTITIONER

## 2024-09-27 RX ORDER — OXYCODONE AND ACETAMINOPHEN 5; 325 MG/1; MG/1
1 TABLET ORAL EVERY 4 HOURS PRN
Qty: 10 TABLET | Refills: 0 | Status: SHIPPED | OUTPATIENT
Start: 2024-09-27

## 2024-09-27 RX ORDER — PSEUDOEPHEDRINE HCL 120 MG/1
120 TABLET, FILM COATED, EXTENDED RELEASE ORAL EVERY 12 HOURS
Qty: 60 TABLET | Refills: 1 | Status: SHIPPED | OUTPATIENT
Start: 2024-09-27

## 2024-09-27 RX ORDER — TRAZODONE HYDROCHLORIDE 100 MG/1
100 TABLET ORAL NIGHTLY
Qty: 90 TABLET | Refills: 1 | Status: SHIPPED | OUTPATIENT
Start: 2024-09-27

## 2025-04-09 ENCOUNTER — OFFICE VISIT (OUTPATIENT)
Dept: FAMILY MEDICINE CLINIC | Facility: CLINIC | Age: 65
End: 2025-04-09
Payer: MEDICARE

## 2025-04-09 VITALS
TEMPERATURE: 98 F | DIASTOLIC BLOOD PRESSURE: 80 MMHG | WEIGHT: 180 LBS | BODY MASS INDEX: 25.77 KG/M2 | OXYGEN SATURATION: 98 % | HEIGHT: 70 IN | HEART RATE: 59 BPM | SYSTOLIC BLOOD PRESSURE: 110 MMHG

## 2025-04-09 DIAGNOSIS — M79.602 LEFT ARM PAIN: ICD-10-CM

## 2025-04-09 DIAGNOSIS — M79.672 LEFT FOOT PAIN: ICD-10-CM

## 2025-04-09 DIAGNOSIS — G89.29 CHRONIC MIDLINE LOW BACK PAIN, UNSPECIFIED WHETHER SCIATICA PRESENT: ICD-10-CM

## 2025-04-09 DIAGNOSIS — Z09 FOLLOW-UP EXAM, 3-6 MONTHS SINCE PREVIOUS EXAM: Primary | ICD-10-CM

## 2025-04-09 DIAGNOSIS — Q27.9 VENOUS ANOMALY: ICD-10-CM

## 2025-04-09 DIAGNOSIS — E66.3 OVERWEIGHT WITH BODY MASS INDEX (BMI) OF 25 TO 25.9 IN ADULT: ICD-10-CM

## 2025-04-09 DIAGNOSIS — E78.2 MIXED HYPERLIPIDEMIA: ICD-10-CM

## 2025-04-09 DIAGNOSIS — N13.8 BPH WITH OBSTRUCTION/LOWER URINARY TRACT SYMPTOMS: ICD-10-CM

## 2025-04-09 DIAGNOSIS — D50.9 IRON DEFICIENCY ANEMIA, UNSPECIFIED IRON DEFICIENCY ANEMIA TYPE: ICD-10-CM

## 2025-04-09 DIAGNOSIS — G47.00 INSOMNIA, UNSPECIFIED TYPE: ICD-10-CM

## 2025-04-09 DIAGNOSIS — M54.50 CHRONIC MIDLINE LOW BACK PAIN, UNSPECIFIED WHETHER SCIATICA PRESENT: ICD-10-CM

## 2025-04-09 DIAGNOSIS — E55.9 VITAMIN D DEFICIENCY: ICD-10-CM

## 2025-04-09 DIAGNOSIS — E53.8 VITAMIN B12 DEFICIENCY: ICD-10-CM

## 2025-04-09 DIAGNOSIS — N40.1 BPH WITH OBSTRUCTION/LOWER URINARY TRACT SYMPTOMS: ICD-10-CM

## 2025-04-09 LAB
25(OH)D3 SERPL-MCNC: 57.5 NG/ML (ref 30–100)
ALBUMIN SERPL-MCNC: 4.4 G/DL (ref 3.5–5.2)
ALBUMIN/GLOB SERPL: 1.3 G/DL
ALP SERPL-CCNC: 102 U/L (ref 39–117)
ALT SERPL W P-5'-P-CCNC: 17 U/L (ref 1–41)
ANION GAP SERPL CALCULATED.3IONS-SCNC: 10.3 MMOL/L (ref 5–15)
AST SERPL-CCNC: 26 U/L (ref 1–40)
BASOPHILS # BLD MANUAL: 0.07 10*3/MM3 (ref 0–0.2)
BASOPHILS NFR BLD MANUAL: 1 % (ref 0–1.5)
BILIRUB SERPL-MCNC: 1.4 MG/DL (ref 0–1.2)
BUN SERPL-MCNC: 20 MG/DL (ref 8–23)
BUN/CREAT SERPL: 17.9 (ref 7–25)
CALCIUM SPEC-SCNC: 9.8 MG/DL (ref 8.6–10.5)
CHLORIDE SERPL-SCNC: 102 MMOL/L (ref 98–107)
CHOLEST SERPL-MCNC: 171 MG/DL (ref 0–200)
CO2 SERPL-SCNC: 24.7 MMOL/L (ref 22–29)
CREAT SERPL-MCNC: 1.12 MG/DL (ref 0.76–1.27)
DEPRECATED RDW RBC AUTO: 44 FL (ref 37–54)
EGFRCR SERPLBLD CKD-EPI 2021: 72.5 ML/MIN/1.73
EOSINOPHIL # BLD MANUAL: 0.28 10*3/MM3 (ref 0–0.4)
EOSINOPHIL NFR BLD MANUAL: 4 % (ref 0.3–6.2)
ERYTHROCYTE [DISTWIDTH] IN BLOOD BY AUTOMATED COUNT: 12.7 % (ref 12.3–15.4)
FERRITIN SERPL-MCNC: 96.8 NG/ML (ref 30–400)
FOLATE SERPL-MCNC: 7.85 NG/ML (ref 4.78–24.2)
GLOBULIN UR ELPH-MCNC: 3.5 GM/DL
GLUCOSE SERPL-MCNC: 93 MG/DL (ref 65–99)
HCT VFR BLD AUTO: 46.4 % (ref 37.5–51)
HDLC SERPL-MCNC: 51 MG/DL (ref 40–60)
HGB BLD-MCNC: 15.3 G/DL (ref 13–17.7)
IRON 24H UR-MRATE: 114 MCG/DL (ref 59–158)
IRON SATN MFR SERPL: 30 % (ref 20–50)
LDLC SERPL CALC-MCNC: 107 MG/DL (ref 0–100)
LDLC/HDLC SERPL: 2.1 {RATIO}
LYMPHOCYTES # BLD MANUAL: 1.38 10*3/MM3 (ref 0.7–3.1)
LYMPHOCYTES NFR BLD MANUAL: 7 % (ref 5–12)
MCH RBC QN AUTO: 31.4 PG (ref 26.6–33)
MCHC RBC AUTO-ENTMCNC: 33 G/DL (ref 31.5–35.7)
MCV RBC AUTO: 95.1 FL (ref 79–97)
MONOCYTES # BLD: 0.48 10*3/MM3 (ref 0.1–0.9)
NEUTROPHILS # BLD AUTO: 4.7 10*3/MM3 (ref 1.7–7)
NEUTROPHILS NFR BLD MANUAL: 68 % (ref 42.7–76)
PLAT MORPH BLD: NORMAL
PLATELET # BLD AUTO: 276 10*3/MM3 (ref 140–450)
PMV BLD AUTO: 10.6 FL (ref 6–12)
POIKILOCYTOSIS BLD QL SMEAR: NORMAL
POTASSIUM SERPL-SCNC: 4.4 MMOL/L (ref 3.5–5.2)
PROT SERPL-MCNC: 7.9 G/DL (ref 6–8.5)
PSA SERPL-MCNC: 3.6 NG/ML (ref 0–4)
RBC # BLD AUTO: 4.88 10*6/MM3 (ref 4.14–5.8)
SODIUM SERPL-SCNC: 137 MMOL/L (ref 136–145)
TIBC SERPL-MCNC: 380 MCG/DL (ref 298–536)
TRANSFERRIN SERPL-MCNC: 255 MG/DL (ref 200–360)
TRIGL SERPL-MCNC: 65 MG/DL (ref 0–150)
VARIANT LYMPHS NFR BLD MANUAL: 20 % (ref 19.6–45.3)
VIT B12 BLD-MCNC: 399 PG/ML (ref 211–946)
VLDLC SERPL-MCNC: 13 MG/DL (ref 5–40)
WBC MORPH BLD: NORMAL
WBC NRBC COR # BLD AUTO: 6.91 10*3/MM3 (ref 3.4–10.8)

## 2025-04-09 PROCEDURE — 85027 COMPLETE CBC AUTOMATED: CPT | Performed by: NURSE PRACTITIONER

## 2025-04-09 PROCEDURE — 82746 ASSAY OF FOLIC ACID SERUM: CPT | Performed by: NURSE PRACTITIONER

## 2025-04-09 PROCEDURE — 82306 VITAMIN D 25 HYDROXY: CPT | Performed by: NURSE PRACTITIONER

## 2025-04-09 PROCEDURE — 84466 ASSAY OF TRANSFERRIN: CPT | Performed by: NURSE PRACTITIONER

## 2025-04-09 PROCEDURE — 84153 ASSAY OF PSA TOTAL: CPT | Performed by: NURSE PRACTITIONER

## 2025-04-09 PROCEDURE — 82607 VITAMIN B-12: CPT | Performed by: NURSE PRACTITIONER

## 2025-04-09 PROCEDURE — 83540 ASSAY OF IRON: CPT | Performed by: NURSE PRACTITIONER

## 2025-04-09 PROCEDURE — 85007 BL SMEAR W/DIFF WBC COUNT: CPT | Performed by: NURSE PRACTITIONER

## 2025-04-09 PROCEDURE — 80061 LIPID PANEL: CPT | Performed by: NURSE PRACTITIONER

## 2025-04-09 PROCEDURE — 80053 COMPREHEN METABOLIC PANEL: CPT | Performed by: NURSE PRACTITIONER

## 2025-04-09 PROCEDURE — 82728 ASSAY OF FERRITIN: CPT | Performed by: NURSE PRACTITIONER

## 2025-04-09 NOTE — PROGRESS NOTES
Chief Complaint  Hyperlipidemia (6 month f/u), Pain (Hands and feet ), Fatigue, and Arm Injury (IV vein issues had IV 1/2025 for surgery)    Subjective        Medical History: has a past medical history of BPH (benign prostatic hyperplasia), BPH with obstruction/lower urinary tract symptoms (08/02/2023), Chronic lumbar pain, Diverticulosis (2020), Elevated PSA (9/1/2022), Frequent urination, GERD (gastroesophageal reflux disease), History of kidney stones, Hyperlipidemia, Insomnia, and Left inguinal hernia.     Surgical History: has a past surgical history that includes Colonoscopy (07/20/2017); Umbilical hernia repair; Epidural block injection; Lithotripsy; Vasectomy; Fracture surgery (1970); PROSTATE AQUABLATION (N/A, 08/02/2023); Inguinal Hernia Repair (Bilateral, 08/21/2023); Prostate surgery (August 2023); and Spine surgery (January 13 2025).     Family History: family history includes Breast cancer in an other family member; Cancer in his father and mother; Stroke in an other family member.     Social History: reports that he has never smoked. He has never used smokeless tobacco. He reports current alcohol use of about 7.0 standard drinks of alcohol per week. He reports that he does not use drugs.    Andrew Miranda presents to [facility]      Symptoms are: recurrent.   Onset was 1 to 6 months.   Symptoms occur: daily.  Symptoms include: joint pain, fatigue, myalgias and neck pain.   Pertinent negative symptoms include no abdominal pain, no anorexia, no change in stool, no chest pain, no chills, no congestion, no cough, no diaphoresis, no fever, no headaches, no joint swelling, no nausea, no numbness, no rash, no sore throat, no swollen glands, no dysuria, no vertigo, no visual change, no vomiting and no weakness.   Hyperlipidemia  This is a chronic problem. The current episode started more than 1 year ago. The problem is controlled. He has no history of diabetes or obesity.  Current antihyperlipidemic treatment includes statins and diet change. The current treatment provides moderate improvement of lipids. There are no compliance problems.  Risk factors for coronary artery disease include dyslipidemia, family history and male sex.    Heartburn  He complains of heartburn. This is a chronic problem. The current episode started more than 1 year ago. The heartburn is located in the substernum. The heartburn does not wake him from sleep. The heartburn does not limit his activity. The heartburn doesn't change with position. The symptoms are aggravated by certain foods and lying down. There are no known risk factors. The treatment provided significant relief.   History of Present Illness  The patient presents for evaluation of fatigue, left arm lumps, joint pain, and left foot pain.    He continues to experience fatigue, which he attributes to aging. His sleep is generally uninterrupted, with only one instance of waking up at night, after which he is able to return to sleep. He reports no family history of sleep disorders, morning headaches, or snoring. He has discontinued iron supplements due to adverse effects but maintains his vitamin D regimen. He is currently not on any pain medications following a successful back surgery. He reports no chest pain or shortness of breath. He is currently taking Prilosec, trazodone, Lipitor, and vitamin D.    He underwent back surgery and is doing remarkably well. He experiences stiffness in his back when lying down for an hour or less, but it loosens up after the first 8 to 10 steps. If he lies on his back all night, it aches when he gets up, but it is not as stiff. He is doing his own therapy slowly to avoid any complications. He was up and walking the next day after surgery and was walking a couple of miles after a couple of weeks. It has been a little over 3 months now since the surgery, and he has started doing some stretching. It will take 3 to 4 more  "months before he can really get going. He was informed that it takes 6 months to a year to fully heal. Bending over and lifting bothers him, so he avoids those activities. Currently, he describes the pain as a light ache, which is much better than the periodic stabbing pains he used to experience for 3 to 4 days at a time. He was given oxycodone to take home, which he took for the first couple of days and then did not need it anymore. He also stopped taking Celebrex.    He reports the development of lumps in his left arm vein following intravenous therapy, which he believes may be clots. These lumps have slightly reduced in size. He has no history of clots and does not take baby aspirin.    He has begun to experience joint pain, particularly in the mornings, and notes a decrease in  strength. He reports no joint locking. He has recently started using hand weights and performs arm and shoulder stretches in the morning.    He experiences pain on the top of his left foot, even at rest, but reports no swelling. His footwear fits consistently, and he has not altered his exercise routine. This symptom has been present for some time. He typically wears slippers or goes barefoot at home.    He has not had a colon screening in quite a while and is interested in [email or web address].    FAMILY HISTORY  He does not report any family history of sleep disorders.    MEDICATIONS  Current: Prilosec, trazodone, Lipitor, vitamin D  Discontinued: iron supplements      Objective   Vital Signs:   /80   Pulse 59   Temp 98 °F (36.7 °C)   Ht 177.8 cm (70\")   Wt 81.6 kg (180 lb)   SpO2 98%   BMI 25.83 kg/m²       Wt Readings from Last 3 Encounters:   04/09/25 81.6 kg (180 lb)   09/27/24 79.8 kg (176 lb)   03/14/24 82.3 kg (181 lb 8 oz)        BP Readings from Last 3 Encounters:   04/09/25 110/80   09/27/24 106/76   03/14/24 110/74                Physical Exam  Vitals reviewed.   Constitutional:       General: He is not in acute " distress.     Appearance: Normal appearance. He is well-developed. He is not ill-appearing.   HENT:      Head: Normocephalic and atraumatic.   Eyes:      Conjunctiva/sclera: Conjunctivae normal.      Pupils: Pupils are equal, round, and reactive to light.   Cardiovascular:      Rate and Rhythm: Normal rate and regular rhythm.      Heart sounds: No murmur heard.  Pulmonary:      Effort: Pulmonary effort is normal.      Breath sounds: Normal breath sounds. No wheezing.   Skin:     General: Skin is warm and dry.   Neurological:      Mental Status: He is alert and oriented to person, place, and time.   Psychiatric:         Mood and Affect: Mood and affect normal.         Behavior: Behavior normal.         Thought Content: Thought content normal.         Judgment: Judgment normal.        Result Review :  The following data was reviewed by ZANE Maciel on 04/09/2025.  Common labs          5/3/2024    12:39 9/27/2024    09:56   Common Labs   Glucose  99    BUN  21    Creatinine  1.00    Sodium  139    Potassium  4.7    Chloride  106    Calcium  9.3    Albumin  4.1    Total Bilirubin 0.8  1.1    Alkaline Phosphatase  82    AST (SGOT)  30    ALT (SGPT)  36    WBC  8.08    Hemoglobin  13.7    Hematocrit  42.7    Platelets  368    Total Cholesterol  140    Triglycerides  63    HDL Cholesterol  42    LDL Cholesterol   85    PSA  5.170      Data reviewed : Previous office note             Current Outpatient Medications on File Prior to Visit   Medication Sig Dispense Refill    atorvastatin (Lipitor) 80 MG tablet Take 1 tablet by mouth Daily. 90 tablet 1    omeprazole (priLOSEC) 40 MG capsule Take 1 capsule by mouth Daily. 90 capsule 1    traZODone (DESYREL) 100 MG tablet Take 1 tablet by mouth Every Night. 90 tablet 1    [DISCONTINUED] celecoxib (CeleBREX) 200 MG capsule Take 1 capsule by mouth Daily. 90 capsule 1    [DISCONTINUED] oxyCODONE-acetaminophen (PERCOCET) 5-325 MG per tablet Take 1 tablet by mouth Every  4 (Four) Hours As Needed for Moderate Pain or Severe Pain. 10 tablet 0    [DISCONTINUED] pseudoephedrine (SUDAFED) 120 MG 12 hr tablet Take 1 tablet by mouth Every 12 (Twelve) Hours. 60 tablet 1    cholecalciferol (VITAMIN D3) 10 MCG (400 UNIT) tablet Take 1 tablet by mouth Daily. 90 tablet 1    [DISCONTINUED] Iron, Ferrous Sulfate, 325 (65 Fe) MG tablet Take 1 tablet by mouth Daily. 90 tablet 1    [DISCONTINUED] Lactobacillus (Probiotic Acidophilus) capsule Take 1 capsule by mouth Daily. 90 capsule 1     No current facility-administered medications on file prior to visit.        Assessment and Plan  Diagnoses and all orders for this visit:    1. Follow-up exam, 3-6 months since previous exam (Primary)    2. Insomnia, unspecified type    3. Chronic midline low back pain, unspecified whether sciatica present  -     CBC With Manual Differential  -     Comprehensive Metabolic Panel    4. Vitamin D deficiency  -     Vitamin D,25-Hydroxy    5. Iron deficiency anemia, unspecified iron deficiency anemia type  -     Ferritin  -     Iron Profile    6. Left foot pain  -     XR Foot 3+ View Left; Future    7. Mixed hyperlipidemia  -     CBC With Manual Differential  -     Comprehensive Metabolic Panel  -     Lipid Panel    8. Overweight with body mass index (BMI) of 25 to 25.9 in adult    9. BPH with obstruction/lower urinary tract symptoms  -     PSA DIAGNOSTIC    10. Left arm pain  -     Duplex Venous Upper Extremity - Left CAR; Future    11. Venous anomaly  -     Duplex Venous Upper Extremity - Left CAR; Future    12. Vitamin B12 deficiency  -     Vitamin B12 & Folate        Assessment & Plan  1. Fatigue.  His iron levels have normalized over time, as indicated by the most recent lab results. He will continue his current regimen of Prilosec, trazodone, Lipitor, and vitamin D. A re-evaluation of his prostate-specific antigen (PSA) levels will be conducted today. His iron levels will also be reassessed.    2. Left arm  lumps.  An ultrasound of the left arm will be ordered to further investigate the lumps. If the ultrasound confirms superficial clots, they will be monitored as they typically resolve on their own.    3. Joint pain.  The pain appears to be more arthritic in nature. He is advised to continue with his hand weight exercises and incorporate stretches before and after these exercises.    4. Left foot pain.  He is advised to wear slippers and socks at home. If there is no improvement in his symptoms, an x-ray of the foot will be ordered to rule out arthritis or other potential issues.    5. Health maintenance.  He is due for a colonoscopy in 2027, as his last one was in 2017. He has diverticulosis, which was noted during his last colonoscopy.    PROCEDURE  The patient underwent back surgery approximately 3 months ago.      Follow Up   Return in about 6 months (around 10/9/2025) for Recheck.  Patient was given instructions and counseling regarding his condition or for health maintenance advice. Please see specific information pulled into the AVS if appropriate.       Part of this note may be electronic transcription/translation of spoken language to printed text using the Dragon dictation system    Patient or patient representative verbalized consent for the use of Ambient Listening during the visit with  ZANE Maciel for chart documentation. 4/9/2025  10:50 EDT

## 2025-04-18 ENCOUNTER — HOSPITAL ENCOUNTER (OUTPATIENT)
Dept: CARDIOLOGY | Facility: HOSPITAL | Age: 65
Discharge: HOME | End: 2025-04-18
Payer: MEDICARE

## 2025-04-18 DIAGNOSIS — M79.602 LEFT ARM PAIN: ICD-10-CM

## 2025-04-18 DIAGNOSIS — Q27.9 VENOUS ANOMALY: ICD-10-CM

## 2025-04-18 LAB
BH CV UPPER VENOUS LEFT AXILLARY AUGMENT: NORMAL
BH CV UPPER VENOUS LEFT AXILLARY COMPRESS: NORMAL
BH CV UPPER VENOUS LEFT AXILLARY PHASIC: NORMAL
BH CV UPPER VENOUS LEFT AXILLARY SPONT: NORMAL
BH CV UPPER VENOUS LEFT BASILIC UPPER COMPRESS: NORMAL
BH CV UPPER VENOUS LEFT BRACHIAL COMPRESS: NORMAL
BH CV UPPER VENOUS LEFT CEPHALIC UPPER COMPRESS: NORMAL
BH CV UPPER VENOUS LEFT INTERNAL JUGULAR AUGMENT: NORMAL
BH CV UPPER VENOUS LEFT INTERNAL JUGULAR COMPRESS: NORMAL
BH CV UPPER VENOUS LEFT INTERNAL JUGULAR PHASIC: NORMAL
BH CV UPPER VENOUS LEFT INTERNAL JUGULAR SPONT: NORMAL
BH CV UPPER VENOUS LEFT RADIAL COMPRESS: NORMAL
BH CV UPPER VENOUS LEFT SUBCLAVIAN AUGMENT: NORMAL
BH CV UPPER VENOUS LEFT SUBCLAVIAN COMPRESS: NORMAL
BH CV UPPER VENOUS LEFT SUBCLAVIAN PHASIC: NORMAL
BH CV UPPER VENOUS LEFT SUBCLAVIAN SPONT: NORMAL
BH CV UPPER VENOUS LEFT ULNAR COMPRESS: NORMAL
BH CV UPPER VENOUS RIGHT INTERNAL JUGULAR AUGMENT: NORMAL
BH CV UPPER VENOUS RIGHT INTERNAL JUGULAR COMPRESS: NORMAL
BH CV UPPER VENOUS RIGHT INTERNAL JUGULAR PHASIC: NORMAL
BH CV UPPER VENOUS RIGHT INTERNAL JUGULAR SPONT: NORMAL
BH CV UPPER VENOUS RIGHT SUBCLAVIAN AUGMENT: NORMAL
BH CV UPPER VENOUS RIGHT SUBCLAVIAN COMPRESS: NORMAL
BH CV UPPER VENOUS RIGHT SUBCLAVIAN PHASIC: NORMAL
BH CV UPPER VENOUS RIGHT SUBCLAVIAN SPONT: NORMAL
BH CV VAS PRELIMINARY FINDINGS SCRIPTING: 1

## 2025-04-18 PROCEDURE — 93971 EXTREMITY STUDY: CPT

## (undated) DEVICE — COVER,MAYO STAND,STERILE: Brand: MEDLINE

## (undated) DEVICE — APPL CHLORAPREP HI/LITE 26ML ORNG

## (undated) DEVICE — ANTIBACTERIAL UNDYED BRAIDED (POLYGLACTIN 910), SYNTHETIC ABSORBABLE SUTURE: Brand: COATED VICRYL

## (undated) DEVICE — TOTAL TRAY, 16FR 10ML SIL FOLEY, URN: Brand: MEDLINE

## (undated) DEVICE — BAG,DRAINAGE,4L,A/R TOWER,LL,SLIDE TAP: Brand: MEDLINE

## (undated) DEVICE — SYR LL TP 10ML STRL

## (undated) DEVICE — PK DRP AQUABEAM LITHO 65X69IN

## (undated) DEVICE — 3 RING SUTURE PASSER - 16 CM: Brand: 3 RING SUTURE PASSER - 16 CM

## (undated) DEVICE — SOL IRR NACL 0.9PCT BT 1000ML

## (undated) DEVICE — SLV SCD KN/LEN ADJ EXPRSS BLENDED MD 1P/U

## (undated) DEVICE — DRSNG WND GZ CURAD OIL EMULSION 3X3IN STRL

## (undated) DEVICE — GLV SURG BIOGEL LTX PF 7

## (undated) DEVICE — Device: Brand: AQUABEAM HANDPIECE

## (undated) DEVICE — LAPAROVUE VISIBILITY SYSTEM LAPAROSCOPIC SOLUTIONS: Brand: LAPAROVUE

## (undated) DEVICE — TIP COVER ACCESSORY

## (undated) DEVICE — LAPAROSCOPIC SCISSORS: Brand: EPIX LAPAROSCOPIC SCISSORS

## (undated) DEVICE — ENDOPATH PNEUMONEEDLE INSUFFLATION NEEDLES WITH LUER LOCK CONNECTORS 120MM: Brand: ENDOPATH

## (undated) DEVICE — CANNULA SEAL

## (undated) DEVICE — GLV SURG SENSICARE SLT PF LF 6.5 STRL

## (undated) DEVICE — JELLY,LUBE,STERILE,FLIP TOP,TUBE,4-OZ: Brand: MEDLINE

## (undated) DEVICE — GLV SURG SENSICARE SLT PF LF 7.5 STRL

## (undated) DEVICE — STERILE POLYISOPRENE POWDER-FREE SURGICAL GLOVES WITH EMOLLIENT COATING: Brand: PROTEXIS

## (undated) DEVICE — SYRINGE,TOOMEY,IRRIGATION,70CC,STERILE: Brand: MEDLINE

## (undated) DEVICE — STERILE ULTRASOUND GEL, SAFEWRAP: Brand: ECOVUE

## (undated) DEVICE — CVR PROB ECLPS

## (undated) DEVICE — SHEET,DRAPE,70X85,STERILE: Brand: MEDLINE

## (undated) DEVICE — ENDOPATH XCEL WITH OPTIVIEW TECHNOLOGY BLADELESS TROCARS WITH STABILITY SLEEVES: Brand: ENDOPATH XCEL OPTIVIEW

## (undated) DEVICE — LOU TUR: Brand: MEDLINE INDUSTRIES, INC.

## (undated) DEVICE — TUBING, SUCTION, 1/4" X 20', STRAIGHT: Brand: MEDLINE INDUSTRIES, INC.

## (undated) DEVICE — INTENDED FOR TISSUE SEPARATION, AND OTHER PROCEDURES THAT REQUIRE A SHARP SURGICAL BLADE TO PUNCTURE OR CUT.: Brand: BARD-PARKER ® CARBON RIB-BACK BLADES

## (undated) DEVICE — SUT VIC PLS CTD BR 0 TIE 18IN VIL

## (undated) DEVICE — ARM DRAPE

## (undated) DEVICE — DAVINCI-LF: Brand: MEDLINE INDUSTRIES, INC.

## (undated) DEVICE — HF-RESECTION ELECTRODE PLASMALOOP LOOP, MEDIUM, 24 FR., 12°-30°, ESG TURIS: Brand: OLYMPUS